# Patient Record
Sex: FEMALE | Race: WHITE | Employment: UNEMPLOYED | URBAN - NONMETROPOLITAN AREA
[De-identification: names, ages, dates, MRNs, and addresses within clinical notes are randomized per-mention and may not be internally consistent; named-entity substitution may affect disease eponyms.]

---

## 2018-08-13 ENCOUNTER — HOSPITAL ENCOUNTER (EMERGENCY)
Age: 58
Discharge: HOME | End: 2018-08-13
Attending: EMERGENCY MEDICINE
Payer: MEDICAID

## 2018-08-13 VITALS — SYSTOLIC BLOOD PRESSURE: 136 MMHG | TEMPERATURE: 97.8 F | OXYGEN SATURATION: 96 % | DIASTOLIC BLOOD PRESSURE: 65 MMHG

## 2018-08-13 DIAGNOSIS — R20.2 PARESTHESIAS: Primary | ICD-10-CM

## 2018-08-13 LAB
ABSOLUTE EOS #: 0.15 K/UL (ref 0–0.44)
ABSOLUTE IMMATURE GRANULOCYTE: 0.03 K/UL (ref 0–0.3)
ABSOLUTE LYMPH #: 1.76 K/UL (ref 1.1–3.7)
ABSOLUTE MONO #: 0.59 K/UL (ref 0.1–1.2)
ALBUMIN SERPL-MCNC: 4.2 G/DL (ref 3.5–5.2)
ALBUMIN/GLOBULIN RATIO: 1.6 (ref 1–2.5)
ALP BLD-CCNC: 106 U/L (ref 35–104)
ALT SERPL-CCNC: 43 U/L (ref 5–33)
ANION GAP SERPL CALCULATED.3IONS-SCNC: 11 MMOL/L (ref 9–17)
AST SERPL-CCNC: 26 U/L
BASOPHILS # BLD: 1 % (ref 0–2)
BASOPHILS ABSOLUTE: 0.05 K/UL (ref 0–0.2)
BILIRUB SERPL-MCNC: 0.16 MG/DL (ref 0.3–1.2)
BUN BLDV-MCNC: 12 MG/DL (ref 6–20)
BUN/CREAT BLD: 18 (ref 9–20)
CALCIUM SERPL-MCNC: 9.4 MG/DL (ref 8.6–10.4)
CHLORIDE BLD-SCNC: 107 MMOL/L (ref 98–107)
CO2: 26 MMOL/L (ref 20–31)
CREAT SERPL-MCNC: 0.67 MG/DL (ref 0.5–0.9)
DIFFERENTIAL TYPE: NORMAL
EKG ATRIAL RATE: 65 BPM
EKG P AXIS: 41 DEGREES
EKG P-R INTERVAL: 158 MS
EKG Q-T INTERVAL: 430 MS
EKG QRS DURATION: 76 MS
EKG QTC CALCULATION (BAZETT): 447 MS
EKG R AXIS: -14 DEGREES
EKG T AXIS: 7 DEGREES
EKG VENTRICULAR RATE: 65 BPM
EOSINOPHILS RELATIVE PERCENT: 2 % (ref 1–4)
GFR AFRICAN AMERICAN: >60 ML/MIN
GFR NON-AFRICAN AMERICAN: >60 ML/MIN
GFR SERPL CREATININE-BSD FRML MDRD: ABNORMAL ML/MIN/{1.73_M2}
GFR SERPL CREATININE-BSD FRML MDRD: ABNORMAL ML/MIN/{1.73_M2}
GLUCOSE BLD-MCNC: 90 MG/DL (ref 70–99)
HCT VFR BLD CALC: 42.5 % (ref 36.3–47.1)
HEMOGLOBIN: 14.2 G/DL (ref 11.9–15.1)
IMMATURE GRANULOCYTES: 0 %
INR BLD: 1 (ref 0.9–1.2)
LYMPHOCYTES # BLD: 24 % (ref 24–43)
MAGNESIUM: 2.2 MG/DL (ref 1.6–2.6)
MCH RBC QN AUTO: 29.4 PG (ref 25.2–33.5)
MCHC RBC AUTO-ENTMCNC: 33.4 G/DL (ref 28.4–34.8)
MCV RBC AUTO: 88 FL (ref 82.6–102.9)
MONOCYTES # BLD: 8 % (ref 3–12)
MYOGLOBIN: 32 NG/ML (ref 25–58)
NRBC AUTOMATED: 0 PER 100 WBC
PARTIAL THROMBOPLASTIN TIME: 28.5 SEC (ref 23.2–34.4)
PDW BLD-RTO: 13 % (ref 11.8–14.4)
PLATELET # BLD: 342 K/UL (ref 138–453)
PLATELET ESTIMATE: NORMAL
PMV BLD AUTO: 9.4 FL (ref 8.1–13.5)
POTASSIUM SERPL-SCNC: 3.2 MMOL/L (ref 3.7–5.3)
PROTHROMBIN TIME: 10.3 SEC (ref 9.7–12.2)
RBC # BLD: 4.83 M/UL (ref 3.95–5.11)
RBC # BLD: NORMAL 10*6/UL
SEG NEUTROPHILS: 65 % (ref 36–65)
SEGMENTED NEUTROPHILS ABSOLUTE COUNT: 4.65 K/UL (ref 1.5–8.1)
SODIUM BLD-SCNC: 144 MMOL/L (ref 135–144)
TOTAL PROTEIN: 6.8 G/DL (ref 6.4–8.3)
WBC # BLD: 7.2 K/UL (ref 3.5–11.3)
WBC # BLD: NORMAL 10*3/UL

## 2018-08-13 PROCEDURE — 36415 COLL VENOUS BLD VENIPUNCTURE: CPT

## 2018-08-13 PROCEDURE — 80053 COMPREHEN METABOLIC PANEL: CPT

## 2018-08-13 PROCEDURE — 83874 ASSAY OF MYOGLOBIN: CPT

## 2018-08-13 PROCEDURE — 83735 ASSAY OF MAGNESIUM: CPT

## 2018-08-13 PROCEDURE — 99284 EMERGENCY DEPT VISIT MOD MDM: CPT

## 2018-08-13 PROCEDURE — 85025 COMPLETE CBC W/AUTO DIFF WBC: CPT

## 2018-08-13 PROCEDURE — 85610 PROTHROMBIN TIME: CPT

## 2018-08-13 PROCEDURE — 85730 THROMBOPLASTIN TIME PARTIAL: CPT

## 2018-08-13 PROCEDURE — 93005 ELECTROCARDIOGRAM TRACING: CPT

## 2018-08-13 RX ORDER — DILTIAZEM HYDROCHLORIDE EXTENDED-RELEASE TABLETS 240 MG/1
TABLET, EXTENDED RELEASE ORAL 2 TIMES DAILY
COMMUNITY
End: 2018-09-05

## 2018-08-13 RX ORDER — GABAPENTIN 300 MG/1
300 CAPSULE ORAL 2 TIMES DAILY
COMMUNITY
End: 2018-09-05 | Stop reason: SDUPTHER

## 2018-08-13 RX ORDER — GABAPENTIN 300 MG/1
300 CAPSULE ORAL 2 TIMES DAILY
Qty: 30 CAPSULE | Refills: 0 | Status: SHIPPED | OUTPATIENT
Start: 2018-08-13 | End: 2018-09-05 | Stop reason: SDUPTHER

## 2018-08-13 RX ORDER — BUSPIRONE HYDROCHLORIDE 10 MG/1
10 TABLET ORAL 2 TIMES DAILY
COMMUNITY
End: 2018-09-05 | Stop reason: SDUPTHER

## 2018-08-13 RX ORDER — ATENOLOL 25 MG/1
25 TABLET ORAL DAILY
COMMUNITY
End: 2018-09-05 | Stop reason: SDUPTHER

## 2018-08-13 RX ORDER — BUPROPION HYDROCHLORIDE 150 MG/1
150 TABLET ORAL EVERY MORNING
COMMUNITY
End: 2018-09-05 | Stop reason: SDUPTHER

## 2018-08-13 RX ORDER — OMEPRAZOLE 20 MG/1
40 CAPSULE, DELAYED RELEASE ORAL DAILY
COMMUNITY
End: 2018-09-05 | Stop reason: SDUPTHER

## 2018-08-13 ASSESSMENT — PAIN DESCRIPTION - PAIN TYPE: TYPE: ACUTE PAIN

## 2018-08-13 ASSESSMENT — PAIN DESCRIPTION - DESCRIPTORS: DESCRIPTORS: ACHING;DISCOMFORT

## 2018-08-13 ASSESSMENT — PAIN DESCRIPTION - FREQUENCY: FREQUENCY: CONTINUOUS

## 2018-08-13 ASSESSMENT — PAIN DESCRIPTION - LOCATION: LOCATION: HEAD

## 2018-08-13 ASSESSMENT — PAIN SCALES - GENERAL: PAINLEVEL_OUTOF10: 4

## 2018-08-13 NOTE — ED PROVIDER NOTES
Performed by ED Physician - none    LABS:  Labs Reviewed   COMPREHENSIVE METABOLIC PANEL - Abnormal; Notable for the following:        Result Value    Potassium 3.2 (*)     Alkaline Phosphatase 106 (*)     ALT 43 (*)     Total Bilirubin 0.16 (*)     All other components within normal limits   APTT   CBC WITH AUTO DIFFERENTIAL   PROTIME-INR   MAGNESIUM   MYOGLOBIN, SERUM       All other labs were within normal range or not returned as of this dictation. EMERGENCY DEPARTMENT COURSE and DIFFERENTIAL DIAGNOSIS/MDM:   Vitals:    Vitals:    08/13/18 1739 08/13/18 1741   BP:  (!) 154/97   Temp:  97.8 °F (36.6 °C)   TempSrc:  Tympanic   SpO2: 97%        Patient has a pretty much intact neurologic exam at this time. I have reviewed partial records from her hospital visits to the [facility] and there is one dated August 1 which states that her recent medical care showed that she was seen and hospitalized with a small left temporal CVA and CN III changes. This does not explain the patient's generalized facial and all 4 extremity numbness and tingling. I do not have access at this time to the her MRI results and other imaging studies that were performed at that facility. The plan is to refer her to outpatient primary care and neurology follow-up. Patient states that she has started feeling worse after running out of gabapentin 2 days ago and is provided another prescription for gabapentin 300 mg twice a day. She is also advised to start taking one low-dose aspirin tablet daily. MDM    CONSULTS:  None    PROCEDURES:  Unless otherwise noted below, none     Procedures    FINAL IMPRESSION      1.  Paresthesias          DISPOSITION/PLAN   DISPOSITION Decision To Discharge 08/13/2018 06:35:08 PM      PATIENT REFERRED TO:  [address]  [phone]  Schedule an appointment as soon as possible for a visit       Spike Mora MD  [address] [facility]  [place] [address]  [phone]    Schedule an appointment as soon as possible for a visit         DISCHARGE MEDICATIONS:  New Prescriptions    GABAPENTIN (NEURONTIN) 300 MG CAPSULE    Take 1 capsule by mouth 2 times daily for 15 days. .          Problem List:  There is no problem list on file for this patient. Summation      Patient Course:  Stable. ED Medications administered this visit:  Medications - No data to display    New Prescriptions from this visit:    New Prescriptions    GABAPENTIN (NEURONTIN) 300 MG CAPSULE    Take 1 capsule by mouth 2 times daily for 15 days. .       Follow-up:  [facility]  [address] [address]  [phone]  Schedule an appointment as soon as possible for a visit       Rivera Gardner MD  [address] [facility]  [phone]    Schedule an appointment as soon as possible for a visit           Final Impression:   1.  Paresthesias               (Please note that portions of this note were completed with a voice recognition program.  Efforts were made to edit the dictations but occasionally words are mis-transcribed.)    Alexis Miller MD (electronically signed)  Attending Emergency Physician            Alexis Miller MD  08/13/18 2287

## 2018-08-20 RX ORDER — ASPIRIN 81 MG/1
81 TABLET, CHEWABLE ORAL DAILY
COMMUNITY

## 2018-09-05 ENCOUNTER — TELEPHONE (OUTPATIENT)
Dept: PRIMARY CARE CLINIC | Age: 58
End: 2018-09-05

## 2018-09-05 ENCOUNTER — OFFICE VISIT (OUTPATIENT)
Dept: PRIMARY CARE CLINIC | Age: 58
End: 2018-09-05
Payer: MEDICAID

## 2018-09-05 VITALS
SYSTOLIC BLOOD PRESSURE: 125 MMHG | RESPIRATION RATE: 20 BRPM | WEIGHT: 198.1 LBS | TEMPERATURE: 97.8 F | DIASTOLIC BLOOD PRESSURE: 91 MMHG | HEART RATE: 70 BPM

## 2018-09-05 DIAGNOSIS — Z86.39 HISTORY OF LOW POTASSIUM: ICD-10-CM

## 2018-09-05 DIAGNOSIS — Z13.220 LIPID SCREENING: ICD-10-CM

## 2018-09-05 DIAGNOSIS — F33.1 MODERATE EPISODE OF RECURRENT MAJOR DEPRESSIVE DISORDER (HCC): ICD-10-CM

## 2018-09-05 DIAGNOSIS — I10 ESSENTIAL HYPERTENSION: Primary | ICD-10-CM

## 2018-09-05 DIAGNOSIS — Z12.31 SCREENING MAMMOGRAM, ENCOUNTER FOR: ICD-10-CM

## 2018-09-05 DIAGNOSIS — E07.9 THYROID MASS: ICD-10-CM

## 2018-09-05 DIAGNOSIS — Z12.11 COLON CANCER SCREENING: ICD-10-CM

## 2018-09-05 DIAGNOSIS — Z12.11 COLON CANCER SCREENING: Primary | ICD-10-CM

## 2018-09-05 DIAGNOSIS — Z23 NEED FOR INFLUENZA VACCINATION: ICD-10-CM

## 2018-09-05 DIAGNOSIS — I63.9 CEREBROVASCULAR ACCIDENT (CVA), UNSPECIFIED MECHANISM (HCC): ICD-10-CM

## 2018-09-05 DIAGNOSIS — K21.9 GASTROESOPHAGEAL REFLUX DISEASE, ESOPHAGITIS PRESENCE NOT SPECIFIED: ICD-10-CM

## 2018-09-05 PROCEDURE — 90686 IIV4 VACC NO PRSV 0.5 ML IM: CPT | Performed by: NURSE PRACTITIONER

## 2018-09-05 PROCEDURE — 99203 OFFICE O/P NEW LOW 30 MIN: CPT | Performed by: NURSE PRACTITIONER

## 2018-09-05 PROCEDURE — 90471 IMMUNIZATION ADMIN: CPT | Performed by: NURSE PRACTITIONER

## 2018-09-05 PROCEDURE — 96160 PT-FOCUSED HLTH RISK ASSMT: CPT | Performed by: NURSE PRACTITIONER

## 2018-09-05 RX ORDER — BUSPIRONE HYDROCHLORIDE 10 MG/1
10 TABLET ORAL 2 TIMES DAILY
Qty: 60 TABLET | Refills: 3 | Status: SHIPPED | OUTPATIENT
Start: 2018-09-05 | End: 2019-06-04 | Stop reason: ALTCHOICE

## 2018-09-05 RX ORDER — PHENTERMINE HYDROCHLORIDE 37.5 MG/1
37.5 CAPSULE ORAL EVERY MORNING
COMMUNITY
End: 2018-09-24 | Stop reason: ALTCHOICE

## 2018-09-05 RX ORDER — BUPROPION HYDROCHLORIDE 150 MG/1
150 TABLET ORAL EVERY MORNING
Qty: 30 TABLET | Refills: 3 | Status: SHIPPED | OUTPATIENT
Start: 2018-09-05 | End: 2018-12-03 | Stop reason: SDUPTHER

## 2018-09-05 RX ORDER — GABAPENTIN 300 MG/1
300 CAPSULE ORAL 2 TIMES DAILY
Qty: 60 CAPSULE | Refills: 0 | Status: SHIPPED | OUTPATIENT
Start: 2018-09-05 | End: 2018-10-05 | Stop reason: SDUPTHER

## 2018-09-05 RX ORDER — DILTIAZEM HYDROCHLORIDE 180 MG/1
180 CAPSULE, COATED, EXTENDED RELEASE ORAL DAILY
Qty: 30 CAPSULE | Refills: 3 | Status: SHIPPED | OUTPATIENT
Start: 2018-09-05 | End: 2018-09-06 | Stop reason: SDUPTHER

## 2018-09-05 RX ORDER — CHOLECALCIFEROL (VITAMIN D3) 125 MCG
5 CAPSULE ORAL DAILY
COMMUNITY
End: 2019-12-09

## 2018-09-05 RX ORDER — DILTIAZEM HYDROCHLORIDE 180 MG/1
180 CAPSULE, EXTENDED RELEASE ORAL 2 TIMES DAILY
Qty: 30 CAPSULE | Refills: 3 | Status: SHIPPED | OUTPATIENT
Start: 2018-09-05 | End: 2018-12-03 | Stop reason: DRUGHIGH

## 2018-09-05 RX ORDER — OMEPRAZOLE 20 MG/1
20 CAPSULE, DELAYED RELEASE ORAL DAILY
Qty: 30 CAPSULE | Refills: 3 | Status: SHIPPED | OUTPATIENT
Start: 2018-09-05 | End: 2019-05-30 | Stop reason: SDUPTHER

## 2018-09-05 RX ORDER — GABAPENTIN 300 MG/1
300 CAPSULE ORAL 2 TIMES DAILY
Qty: 90 CAPSULE | Status: CANCELLED | OUTPATIENT
Start: 2018-09-05

## 2018-09-05 RX ORDER — DILTIAZEM HYDROCHLORIDE EXTENDED-RELEASE TABLETS 240 MG/1
TABLET, EXTENDED RELEASE ORAL 2 TIMES DAILY
Qty: 30 TABLET | Status: CANCELLED | OUTPATIENT
Start: 2018-09-05

## 2018-09-05 RX ORDER — ATENOLOL 25 MG/1
25 TABLET ORAL DAILY
Qty: 30 TABLET | Refills: 3 | Status: SHIPPED | OUTPATIENT
Start: 2018-09-05 | End: 2019-04-07 | Stop reason: SDUPTHER

## 2018-09-05 RX ORDER — DILTIAZEM HYDROCHLORIDE 180 MG/1
180 CAPSULE, EXTENDED RELEASE ORAL 2 TIMES DAILY
COMMUNITY
End: 2018-09-05 | Stop reason: SDUPTHER

## 2018-09-05 ASSESSMENT — PATIENT HEALTH QUESTIONNAIRE - PHQ9
7. TROUBLE CONCENTRATING ON THINGS, SUCH AS READING THE NEWSPAPER OR WATCHING TELEVISION: 0
SUM OF ALL RESPONSES TO PHQ QUESTIONS 1-9: 9
3. TROUBLE FALLING OR STAYING ASLEEP: 3
1. LITTLE INTEREST OR PLEASURE IN DOING THINGS: 2
SUM OF ALL RESPONSES TO PHQ QUESTIONS 1-9: 9
10. IF YOU CHECKED OFF ANY PROBLEMS, HOW DIFFICULT HAVE THESE PROBLEMS MADE IT FOR YOU TO DO YOUR WORK, TAKE CARE OF THINGS AT HOME, OR GET ALONG WITH OTHER PEOPLE: 0
SUM OF ALL RESPONSES TO PHQ9 QUESTIONS 1 & 2: 4
4. FEELING TIRED OR HAVING LITTLE ENERGY: 1
5. POOR APPETITE OR OVEREATING: 0
2. FEELING DOWN, DEPRESSED OR HOPELESS: 2
9. THOUGHTS THAT YOU WOULD BE BETTER OFF DEAD, OR OF HURTING YOURSELF: 0
8. MOVING OR SPEAKING SO SLOWLY THAT OTHER PEOPLE COULD HAVE NOTICED. OR THE OPPOSITE, BEING SO FIGETY OR RESTLESS THAT YOU HAVE BEEN MOVING AROUND A LOT MORE THAN USUAL: 0
6. FEELING BAD ABOUT YOURSELF - OR THAT YOU ARE A FAILURE OR HAVE LET YOURSELF OR YOUR FAMILY DOWN: 1

## 2018-09-05 ASSESSMENT — ENCOUNTER SYMPTOMS
COUGH: 0
RESPIRATORY NEGATIVE: 1
GASTROINTESTINAL NEGATIVE: 1
SORE THROAT: 0
WHEEZING: 0
SHORTNESS OF BREATH: 0
CHEST TIGHTNESS: 0
TROUBLE SWALLOWING: 0

## 2018-09-05 NOTE — TELEPHONE ENCOUNTER
Direct Endoscopy Referral       Fax to [phone] or call the [facility] [address] [facility] [address] [facility]  1960  [phone]     [facility]    Referring Provider: Idalmis Atwood, OLIVER - CNP   [address] [phone]  Special Requests: PLEASE CALL AND SCHEDULE PATIENT    Check Requested Procedures:    [x]  Colonoscopy (Please check test type and diagnosis below)    [x]  CPT Code:  Screening Average Risk     []  CPT Code:  Screening High Risk                 []  CPT Code :  Diagnostic Colonoscopy 66858    []  Personal History of colon cancer (Date of surgery)               []  Personal History of colon polyps (Date of surgery)     []  Family history of colon cancer (Who)     []  Family history of colon polyps (1st degree relative - Who)     []  Abnormal barium enema or CT (Please attach report)    []  Change in bowel habits     []  Occult GI bleeding     []  Melena with negative EGD     []  Iron deficiency anemia    []  Other:        []  EGD (Please check test type and diagnosis below)     []  CPT Code: EGD 18845    []  Melena     []  Dyspepsia/GERD     []  Dysphagia     []  Epigastric pain unresponsive to treatment     []  Iron deficiency anemia with negative colonoscopy     []  Occult GI bleeding with negative colonoscopy     []  Abnormal UGI, x-ray, or CT (attach report)    [] Other:    HISTORY:  Past Medical History:   Diagnosis Date    Anxiety 2012    Broken bones 1985    Depression 2012    Gallbladder & bile duct stone with obstruction 2000    Hyperlipidemia 2012    Hypertension 2012    Numbness and tingling     generalized    Stroke (cerebrum) ([facility] [address] 2018     Patient has no known allergies. Prior to Visit Medications    Medication Sig Taking? Authorizing Provider   melatonin 5 MG TABS tablet Take 5 mg by mouth daily  Historical Provider, MD   phentermine (ADIPEX-P) 37.5 MG capsule Take 37.5 mg by mouth every morning.

## 2018-09-05 NOTE — PROGRESS NOTES
After obtaining consent, and per orders of Jason Vazquez CNP, injection of Influenza Vaccine given in Right deltoid by Daniela Schuster. Patient instructed to remain in clinic for 20 minutes afterwards, and to report any adverse reaction to me immediately.

## 2018-09-05 NOTE — PROGRESS NOTES
weakness to her extremities and a left-sided facial droop however that has improved now. Patient denies upper or lower body extremity weakness or paresthesias at this time. States she did have left facial drooping however verbalizes that has resolved. Patient had recently been seen out of state and had CT scan done and MRI. Patient was diagnosed with a small left temporal CVA with cranial nerve III changes. Other   This is a chronic (\"STOMACH ULCER\") problem. The current episode started more than 1 year ago. Episode frequency: controlled with Omeprazole. The problem has been resolved. Associated symptoms include headaches. Pertinent negatives include no chest pain, coughing, fever, sore throat or weakness. Nothing aggravates the symptoms. Treatments tried: Omeprazole. The treatment provided significant relief. Mental Health Problem   Primary symptoms comment: DEPRESSION/ANXIETY: states had stroke in July and doesn't like the idea of having to ask people to help her, is used to taking care of her mother. . This is a chronic problem. The degree of incapacity that she is experiencing as a consequence of her illness is mild. Additional symptoms of the illness include headaches. Additional symptoms of the illness do not include hypersomnia, appetite change or seizures. She does not admit to suicidal ideas. She does not have a plan to commit suicide. She does not contemplate harming herself. She has not already injured self. She does not contemplate injuring another person. She has not already  injured another person. Risk factors that are present for mental illness include a family history of mental illness.          Past Medical History:     Past Medical History:   Diagnosis Date    Anxiety 2012    Broken bones 1985    Depression 2012    Gallbladder & bile duct stone with obstruction 2000    Hyperlipidemia 2012    Hypertension 2012    Numbness and tingling     generalized    Stroke (cerebrum) ([facility] [address] monitor blood pressure at home and call office with readings. Patient verbalizes her blood pressures are controlled at home. States she is nervous here in the office today. We'll continue to monitor. 2.  CVA: Referral placed to neurology for evaluation and treatment. Formed patient if she has any recurrent symptoms easily emergency room for evaluation and treatment. Patient verbalizes understanding. 3.  Depression: Patient verbalizes she is doing well on BuSpar and Wellbutrin. We'll continue to monitor. 4.  GERD: Patient verbalizes she is doing well with Prilosec. Avoiding GERD related foods    5. History of low potassium: We'll obtain labs for evaluation of potassium level today. 6.  Thyroid mass: Ultrasound of the thyroid ordered today. 7.  Lipid screening: Lipid panel ordered today. 8.  Breast cancer screening: Mammogram ordered today. 9.  Colon cancer screening: Order placed to Dr. Selena Marquez gastroenterology for colonoscopy. 10.  Need for influenza vaccination: Influenza vaccination given in office today. 1.  Renata received counseling on the following healthy behaviors: nutrition, exercise and medication adherence  2. Patient given educational materials - see patient instructions  3. Was a self-tracking handout given in paper form or via N4MDt? No  If yes, see orders or list here. 4.  Discussed use, benefit, and side effects of prescribed medications. Barriers to medication compliance addressed. All patient questions answered. Pt voiced understanding. 5.  Reviewed prior labs and health maintenance  6. Continue current medications, diet and exercise.     Completed Refills   Requested Prescriptions     Signed Prescriptions Disp Refills    busPIRone (BUSPAR) 10 MG tablet 60 tablet 3     Sig: Take 1 tablet by mouth 2 times daily    omeprazole (PRILOSEC) 20 MG delayed release capsule 30 capsule 3     Sig: Take 1 capsule by mouth daily    buPROPion (WELLBUTRIN XL) 150 MG extended release tablet 30 tablet 3     Sig: Take 1 tablet by mouth every morning    atenolol (TENORMIN) 25 MG tablet 30 tablet 3     Sig: Take 1 tablet by mouth daily    gabapentin (NEURONTIN) 300 MG capsule 60 capsule 0     Sig: Take 1 capsule by mouth 2 times daily for 30 days. Kenia Chaudhry diltiazem (DILACOR XR) 180 MG extended release capsule 30 capsule 3     Sig: Take 1 capsule by mouth 2 times daily         Return in about 3 months (around 12/5/2018) for HTN, B/P check one week and at two weeks.     Electronically signed by Electronically signed by Sharmaine Quinn CNP on 9/5/18 at 1:06 PM    (Please note that portions of this note were completed with a voice recognition program.  Efforts were made to edit the dictations but occasionally words are mis-transcribed.)

## 2018-09-06 ENCOUNTER — TELEPHONE (OUTPATIENT)
Dept: PRIMARY CARE CLINIC | Age: 58
End: 2018-09-06

## 2018-09-06 ENCOUNTER — HOSPITAL ENCOUNTER (OUTPATIENT)
Age: 58
Discharge: HOME | End: 2018-09-06
Payer: MEDICAID

## 2018-09-06 DIAGNOSIS — R73.09 ELEVATED GLUCOSE: Primary | ICD-10-CM

## 2018-09-06 DIAGNOSIS — Z13.220 LIPID SCREENING: ICD-10-CM

## 2018-09-06 DIAGNOSIS — R79.89 ELEVATED SERUM CREATININE: Primary | ICD-10-CM

## 2018-09-06 DIAGNOSIS — I63.9 CEREBROVASCULAR ACCIDENT (CVA), UNSPECIFIED MECHANISM (HCC): Primary | ICD-10-CM

## 2018-09-06 DIAGNOSIS — I10 ESSENTIAL HYPERTENSION: ICD-10-CM

## 2018-09-06 DIAGNOSIS — R73.09 ELEVATED GLUCOSE: ICD-10-CM

## 2018-09-06 LAB
ALBUMIN SERPL-MCNC: 3.9 G/DL (ref 3.5–5.2)
ALBUMIN/GLOBULIN RATIO: 1.4 (ref 1–2.5)
ALP BLD-CCNC: 102 U/L (ref 35–104)
ALT SERPL-CCNC: 22 U/L (ref 5–33)
ANION GAP SERPL CALCULATED.3IONS-SCNC: 8 MMOL/L (ref 9–17)
AST SERPL-CCNC: 15 U/L
BILIRUB SERPL-MCNC: 0.23 MG/DL (ref 0.3–1.2)
BUN BLDV-MCNC: 12 MG/DL (ref 6–20)
BUN/CREAT BLD: 12 (ref 9–20)
CALCIUM SERPL-MCNC: 9.4 MG/DL (ref 8.6–10.4)
CHLORIDE BLD-SCNC: 106 MMOL/L (ref 98–107)
CHOLESTEROL/HDL RATIO: 4
CHOLESTEROL: 186 MG/DL
CO2: 27 MMOL/L (ref 20–31)
CREAT SERPL-MCNC: 1 MG/DL (ref 0.5–0.9)
ESTIMATED AVERAGE GLUCOSE: 100 MG/DL
GFR AFRICAN AMERICAN: >60 ML/MIN
GFR NON-AFRICAN AMERICAN: 57 ML/MIN
GFR SERPL CREATININE-BSD FRML MDRD: ABNORMAL ML/MIN/{1.73_M2}
GFR SERPL CREATININE-BSD FRML MDRD: ABNORMAL ML/MIN/{1.73_M2}
GLUCOSE BLD-MCNC: 114 MG/DL (ref 70–99)
HBA1C MFR BLD: 5.1 % (ref 4.8–5.9)
HDLC SERPL-MCNC: 47 MG/DL
LDL CHOLESTEROL: 90 MG/DL (ref 0–130)
POTASSIUM SERPL-SCNC: 4.3 MMOL/L (ref 3.7–5.3)
SODIUM BLD-SCNC: 141 MMOL/L (ref 135–144)
TOTAL PROTEIN: 6.6 G/DL (ref 6.4–8.3)
TRIGL SERPL-MCNC: 246 MG/DL
TSH SERPL DL<=0.05 MIU/L-ACNC: 3.33 MIU/L (ref 0.3–5)
VLDLC SERPL CALC-MCNC: ABNORMAL MG/DL (ref 1–30)

## 2018-09-06 PROCEDURE — 80061 LIPID PANEL: CPT

## 2018-09-06 PROCEDURE — 36415 COLL VENOUS BLD VENIPUNCTURE: CPT

## 2018-09-06 PROCEDURE — 80053 COMPREHEN METABOLIC PANEL: CPT

## 2018-09-06 PROCEDURE — 83036 HEMOGLOBIN GLYCOSYLATED A1C: CPT

## 2018-09-06 PROCEDURE — 84443 ASSAY THYROID STIM HORMONE: CPT

## 2018-09-06 RX ORDER — ATORVASTATIN CALCIUM 40 MG/1
40 TABLET, FILM COATED ORAL DAILY
Qty: 30 TABLET | Refills: 3 | Status: SHIPPED | OUTPATIENT
Start: 2018-09-06 | End: 2018-12-13 | Stop reason: SDUPTHER

## 2018-09-07 ENCOUNTER — TELEPHONE (OUTPATIENT)
Dept: PRIMARY CARE CLINIC | Age: 58
End: 2018-09-07

## 2018-09-07 NOTE — TELEPHONE ENCOUNTER
Left message that medication was called into the pharmacy for pt. Advised in message for pt to call with any questions.

## 2018-09-07 NOTE — TELEPHONE ENCOUNTER
----- Message from [address] [place] [address], OLIVER - CNP sent at 9/7/2018  9:26 AM EDT -----  Results are normal, please call patient and make them aware.

## 2018-09-13 ENCOUNTER — NURSE ONLY (OUTPATIENT)
Dept: PRIMARY CARE CLINIC | Age: 58
End: 2018-09-13

## 2018-09-13 VITALS
TEMPERATURE: 97.2 F | WEIGHT: 199.4 LBS | HEART RATE: 79 BPM | RESPIRATION RATE: 18 BRPM | DIASTOLIC BLOOD PRESSURE: 85 MMHG | SYSTOLIC BLOOD PRESSURE: 123 MMHG

## 2018-09-13 DIAGNOSIS — Z01.30 BLOOD PRESSURE CHECK: Primary | ICD-10-CM

## 2018-09-19 ENCOUNTER — NURSE ONLY (OUTPATIENT)
Dept: PRIMARY CARE CLINIC | Age: 58
End: 2018-09-19

## 2018-09-19 VITALS
TEMPERATURE: 97.7 F | DIASTOLIC BLOOD PRESSURE: 82 MMHG | WEIGHT: 201 LBS | SYSTOLIC BLOOD PRESSURE: 114 MMHG | HEART RATE: 63 BPM | RESPIRATION RATE: 20 BRPM

## 2018-09-19 DIAGNOSIS — Z01.30 BLOOD PRESSURE CHECK: Primary | ICD-10-CM

## 2018-09-24 ENCOUNTER — HOSPITAL ENCOUNTER (OUTPATIENT)
Age: 58
Discharge: HOME | End: 2018-09-24
Payer: MEDICAID

## 2018-09-24 DIAGNOSIS — R29.90 NEUROLOGICAL SYMPTOMS: ICD-10-CM

## 2018-09-24 PROBLEM — I63.9 CEREBROVASCULAR ACCIDENT (CVA) (HCC): Status: RESOLVED | Noted: 2018-09-05 | Resolved: 2018-09-24

## 2018-09-24 LAB
BUN BLDV-MCNC: 11 MG/DL (ref 6–20)
CALCIUM IONIZED: 1.3 MMOL/L (ref 1.13–1.33)
CREAT SERPL-MCNC: 0.94 MG/DL (ref 0.5–0.9)
FOLATE: 6 NG/ML
GFR AFRICAN AMERICAN: >60 ML/MIN
GFR NON-AFRICAN AMERICAN: >60 ML/MIN
GFR SERPL CREATININE-BSD FRML MDRD: ABNORMAL ML/MIN/{1.73_M2}
GFR SERPL CREATININE-BSD FRML MDRD: ABNORMAL ML/MIN/{1.73_M2}
VITAMIN B-12: 478 PG/ML (ref 232–1245)

## 2018-09-24 PROCEDURE — 36415 COLL VENOUS BLD VENIPUNCTURE: CPT

## 2018-09-24 PROCEDURE — 82607 VITAMIN B-12: CPT

## 2018-09-24 PROCEDURE — 82746 ASSAY OF FOLIC ACID SERUM: CPT

## 2018-09-24 PROCEDURE — 83735 ASSAY OF MAGNESIUM: CPT

## 2018-09-24 PROCEDURE — 84520 ASSAY OF UREA NITROGEN: CPT

## 2018-09-24 PROCEDURE — 82330 ASSAY OF CALCIUM: CPT

## 2018-09-24 PROCEDURE — 82565 ASSAY OF CREATININE: CPT

## 2018-09-25 ENCOUNTER — ANESTHESIA EVENT (OUTPATIENT)
Dept: OPERATING ROOM | Age: 58
End: 2018-09-25
Payer: MEDICAID

## 2018-09-25 ENCOUNTER — HOSPITAL ENCOUNTER (OUTPATIENT)
Age: 58
Setting detail: OUTPATIENT SURGERY
Discharge: HOME | End: 2018-09-25
Attending: INTERNAL MEDICINE | Admitting: INTERNAL MEDICINE
Payer: MEDICAID

## 2018-09-25 ENCOUNTER — ANESTHESIA (OUTPATIENT)
Dept: OPERATING ROOM | Age: 58
End: 2018-09-25
Payer: MEDICAID

## 2018-09-25 VITALS
RESPIRATION RATE: 18 BRPM | HEART RATE: 63 BPM | BODY MASS INDEX: 35.44 KG/M2 | SYSTOLIC BLOOD PRESSURE: 116 MMHG | HEIGHT: 63 IN | OXYGEN SATURATION: 95 % | TEMPERATURE: 97 F | WEIGHT: 200 LBS | DIASTOLIC BLOOD PRESSURE: 66 MMHG

## 2018-09-25 VITALS — OXYGEN SATURATION: 97 % | SYSTOLIC BLOOD PRESSURE: 103 MMHG | DIASTOLIC BLOOD PRESSURE: 66 MMHG

## 2018-09-25 LAB — MAGNESIUM, IONIZED: 0.66 MMOL/L (ref 0.45–0.6)

## 2018-09-25 PROCEDURE — 3700000000 HC ANESTHESIA ATTENDED CARE: Performed by: INTERNAL MEDICINE

## 2018-09-25 PROCEDURE — 2580000003 HC RX 258: Performed by: INTERNAL MEDICINE

## 2018-09-25 PROCEDURE — 6360000002 HC RX W HCPCS: Performed by: NURSE ANESTHETIST, CERTIFIED REGISTERED

## 2018-09-25 PROCEDURE — 2709999900 HC NON-CHARGEABLE SUPPLY: Performed by: INTERNAL MEDICINE

## 2018-09-25 PROCEDURE — 3700000001 HC ADD 15 MINUTES (ANESTHESIA): Performed by: INTERNAL MEDICINE

## 2018-09-25 PROCEDURE — 45378 DIAGNOSTIC COLONOSCOPY: CPT | Performed by: INTERNAL MEDICINE

## 2018-09-25 PROCEDURE — 7100000011 HC PHASE II RECOVERY - ADDTL 15 MIN: Performed by: INTERNAL MEDICINE

## 2018-09-25 PROCEDURE — 7100000010 HC PHASE II RECOVERY - FIRST 15 MIN: Performed by: INTERNAL MEDICINE

## 2018-09-25 PROCEDURE — 2500000003 HC RX 250 WO HCPCS: Performed by: NURSE ANESTHETIST, CERTIFIED REGISTERED

## 2018-09-25 PROCEDURE — 3609027000 HC COLONOSCOPY: Performed by: INTERNAL MEDICINE

## 2018-09-25 RX ORDER — PROPOFOL 10 MG/ML
INJECTION, EMULSION INTRAVENOUS CONTINUOUS PRN
Status: DISCONTINUED | OUTPATIENT
Start: 2018-09-25 | End: 2018-09-25 | Stop reason: SDUPTHER

## 2018-09-25 RX ORDER — PROPOFOL 10 MG/ML
INJECTION, EMULSION INTRAVENOUS PRN
Status: DISCONTINUED | OUTPATIENT
Start: 2018-09-25 | End: 2018-09-25 | Stop reason: SDUPTHER

## 2018-09-25 RX ORDER — LIDOCAINE HYDROCHLORIDE 20 MG/ML
INJECTION, SOLUTION INFILTRATION; PERINEURAL PRN
Status: DISCONTINUED | OUTPATIENT
Start: 2018-09-25 | End: 2018-09-25 | Stop reason: SDUPTHER

## 2018-09-25 RX ORDER — SODIUM CHLORIDE, SODIUM LACTATE, POTASSIUM CHLORIDE, CALCIUM CHLORIDE 600; 310; 30; 20 MG/100ML; MG/100ML; MG/100ML; MG/100ML
INJECTION, SOLUTION INTRAVENOUS CONTINUOUS
Status: DISCONTINUED | OUTPATIENT
Start: 2018-09-25 | End: 2018-09-25 | Stop reason: HOSPADM

## 2018-09-25 RX ADMIN — PROPOFOL 200 MCG/KG/MIN: 10 INJECTION, EMULSION INTRAVENOUS at 08:59

## 2018-09-25 RX ADMIN — PROPOFOL 50 MG: 10 INJECTION, EMULSION INTRAVENOUS at 08:59

## 2018-09-25 RX ADMIN — LIDOCAINE HYDROCHLORIDE 40 MG: 20 INJECTION, SOLUTION INFILTRATION; PERINEURAL at 08:59

## 2018-09-25 RX ADMIN — SODIUM CHLORIDE, POTASSIUM CHLORIDE, SODIUM LACTATE AND CALCIUM CHLORIDE: 600; 310; 30; 20 INJECTION, SOLUTION INTRAVENOUS at 08:32

## 2018-09-25 ASSESSMENT — PAIN SCALES - GENERAL: PAINLEVEL_OUTOF10: 0

## 2018-09-25 ASSESSMENT — LIFESTYLE VARIABLES: SMOKING_STATUS: 1

## 2018-09-25 ASSESSMENT — PAIN - FUNCTIONAL ASSESSMENT: PAIN_FUNCTIONAL_ASSESSMENT: 0-10

## 2018-09-25 NOTE — ANESTHESIA PRE PROCEDURE
Department of Anesthesiology  Preprocedure Note       Name:  Kvng Bear   Age:  60 y.o.  :  1960                                          MRN:  [id number]         Date:  2018      Surgeon: Jez Barrientos):  Adi Bender MD    Procedure: Procedure(s):  COLONOSCOPY    Medications prior to admission:   Prior to Admission medications    Medication Sig Start Date End Date Taking? Authorizing Provider   atorvastatin (LIPITOR) 40 MG tablet Take 1 tablet by mouth daily 18  Yes OLIVER Resendiz CNP   melatonin 5 MG TABS tablet Take 5 mg by mouth daily   Yes Historical Provider, MD   busPIRone (BUSPAR) 10 MG tablet Take 1 tablet by mouth 2 times daily 18  Yes OLIVER Resendiz CNP   omeprazole (PRILOSEC) 20 MG delayed release capsule Take 1 capsule by mouth daily 18  Yes OLIVER Resendiz CNP   buPROPion (WELLBUTRIN XL) 150 MG extended release tablet Take 1 tablet by mouth every morning 18  Yes OLIVER Resendiz CNP   atenolol (TENORMIN) 25 MG tablet Take 1 tablet by mouth daily 18  Yes OLIVER Resendiz CNP   gabapentin (NEURONTIN) 300 MG capsule Take 1 capsule by mouth 2 times daily for 30 days. . 9/5/18 10/5/18 Yes OLIVER Resendiz CNP   diltiazem (DILACOR XR) 180 MG extended release capsule Take 1 capsule by mouth 2 times daily 18  Yes OLIVER Resendiz CNP   aspirin 81 MG chewable tablet Take 81 mg by mouth daily   Yes Historical Provider, MD       Current medications:    Current Facility-Administered Medications   Medication Dose Route Frequency Provider Last Rate Last Dose    lactated ringers infusion   Intravenous Continuous Adi Bender  mL/hr at 18 5573         Allergies:  No Known Allergies    Problem List:    Patient Active Problem List   Diagnosis Code    Essential hypertension I10    Moderate episode of recurrent major depressive disorder ([facility] [address] F33.1    Gastroesophageal reflux disease K21.9    Colon cancer screening Z12.11    Neurological symptoms R29.90       Past Medical History:        Diagnosis Date    Anxiety 2012    Broken bones 1985    Depression 2012    Gallbladder & bile duct stone with obstruction 2000    Hyperlipidemia 2012    Hypertension 2012    Numbness and tingling     generalized    Stroke (cerebrum) ([facility] [address] 2018       Past Surgical History:        Procedure Laterality Date    ABDOMEN SURGERY      CHOLECYSTECTOMY      HERNIA REPAIR  2015    HYSTERECTOMY      TONSILLECTOMY AND ADENOIDECTOMY         Social History:    Social History   Substance Use Topics    Smoking status: Current Some Day Smoker     Packs/day: 0.25     Years: 40.00     Last attempt to quit: 2018    Smokeless tobacco: Never Used      Comment: 4 cigarettes daily     Alcohol use No      Comment: Former quit 7/2018                                Ready to quit: Not Answered  Counseling given: Not Answered      Vital Signs (Current):   Vitals:    09/25/18 0821   BP: 112/84   Pulse: 68   Resp: 18   Temp: 36.1 °C (96.9 °F)   TempSrc: Temporal   SpO2: 93%   Weight: 200 lb (90.7 kg)   Height: 5' 3\" (1.6 m)                                              BP Readings from Last 3 Encounters:   09/25/18 112/84   09/24/18 120/76   09/19/18 114/82       NPO Status: Time of last liquid consumption: 0715 (small sip of water with meds)                        Time of last solid consumption: 2200                        Date of last liquid consumption: 09/25/18                        Date of last solid food consumption: 09/23/18    BMI:   Wt Readings from Last 3 Encounters:   09/25/18 200 lb (90.7 kg)   09/24/18 201 lb (91.2 kg)   09/19/18 201 lb (91.2 kg)     Body mass index is 35.43 kg/m².     CBC:   Lab Results   Component Value Date    WBC 7.2 08/13/2018    RBC 4.83 08/13/2018    HGB 14.2 08/13/2018    HCT 42.5 08/13/2018    MCV 88.0 08/13/2018    RDW 13.0 08/13/2018     08/13/2018       CMP:   Lab Results   Component Value Date    NA

## 2018-09-25 NOTE — OP NOTE
PROCEDURE NOTE    DATE OF PROCEDURE: 9/25/2018    ENDOSCOPIST: Shelley Rivera. Thea Grace MD, Laureen Mcneil    ASSISTANT: None    PREOPERATIVE DIAGNOSIS: screening for colon cancer    POSTOPERATIVE DIAGNOSIS: normal colonic mucosa throughout    OPERATION: Colonoscopy --screening    ANESTHESIA: MAC     ESTIMATED BLOOD LOSS: No    COMPLICATIONS: None. SPECIMENS: were not obtained    HISTORY: The patient is a 60y.o. year old female with history of above preop diagnosis. Colonoscopy with possible biopsy or polypectomy has been recommended and I explained the risk, benefits, expected outcome, and alternatives to the procedure. Risks include but are not limited to bleeding, infection, respiratory distress, hypotension, and perforation of the colon. The patient understands and is in agreement. PROCEDURE: The patient was given monitored anesthesia care. The patient was given oxygen by nasal cannula. The colonoscope was inserted per rectum and advanced under direct vision to the cecum without difficulty. Findings:  Cecum/Ascending colon: normal    Transverse colon: normal    Descending/Sigmoid colon: normal    Rectum/Anus: examined in normal and retroflexed positions and was normal    The colon was decompressed and the scope was removed. The patient tolerated the procedure well. Current guidelines call for a repeat colonoscopy in 10 years.       Electronically signed by Nia Marques MD  on 9/25/2018 at 9:34 AM

## 2018-09-25 NOTE — PROGRESS NOTES
Dr. Misa Rowland spoke with pt and visitor. Discharge instructions given to pt and visitor. Discharge Criteria    Inpatients must meet Criteria 1 through 7. All other patients are either YES or N/A. If a NO is chosen then Anesthesia or Surgeon must be notified. 1.  Minimum 30 minutes after last dose of sedative medication, minimum 120 minutes after last dose of reversal agent. Yes      2. Systolic BP stable within 20 mmHg for 30 minutes & systolic BP between 90 & 119 or within 10 mmHg of baseline. Yes      3. Pulse between 60 and 100 or within 10 bpm of baseline. Yes      4. Spontaneous respiratory rate >/= 10 per minute. Yes      5. SaO2 >/= 95 or  >/= baseline. Yes      6. Able to cough and swallow or return to baseline function. Yes      7. Alert and oriented or return to baseline mental status. Yes      8. Demonstrates controlled, coordinated movements, ambulates with steady gait, or return to baseline activity function. Yes      9. Minimal or no pain or nausea, or at a level tolerable and acceptable to patient. Yes      10. Takes and retains oral fluids as allowed. Yes      11. Procedural / perioperative site stable. Minimal or no bleeding. Yes          12. If GI endoscopy procedure, minimal or no abdominal distention or passing flatus. Yes      13. Written discharge instructions and emergency telephone number provided. Yes      14. Accompanied by a responsible adult. Yes      Adult patient discharged from facility without responsible person meets above criteria plus the following:   a) remains awake without stimulus for 30 minutes     b) oriented appropriate for age      c) all vital signs stable   d) no significant risk of losing protective reflexes      e) able to maintain pre-procedure mobility without assistance   f) no nausea or dizziness      g) transportation arrangements that do not require patient to operate motor Vehicle.      N/A

## 2018-09-25 NOTE — ANESTHESIA POSTPROCEDURE EVALUATION
Department of Anesthesiology  Postprocedure Note    Patient: Chadwick Loera  MRN: [id number]  YOB: 1960  Date of evaluation: 9/25/2018  Time:  10:01 AM     Procedure Summary     Date:  09/25/18 [facility] / [facility]    Anesthesia Start:  0856 Anesthesia Stop:  7962    Procedure:  COLONOSCOPY (N/A ) Diagnosis:  (SCREENING)    Surgeon:  Gregorio Holland MD Responsible Provider:  OLIVER Sevilla CRNA    Anesthesia Type:  general, TIVA ASA Status:  3          Anesthesia Type: general, TIVA    Henry Phase I: Henry Score: 10    Henry Phase II:      Last vitals: Reviewed and per EMR flowsheets.        Anesthesia Post Evaluation    Patient location during evaluation: PACU  Patient participation: complete - patient participated  Level of consciousness: awake and alert  Pain score: 0  Airway patency: patent  Nausea & Vomiting: no nausea and no vomiting  Complications: no  Cardiovascular status: blood pressure returned to baseline  Respiratory status: acceptable  Hydration status: euvolemic

## 2018-09-28 ENCOUNTER — HOSPITAL ENCOUNTER (OUTPATIENT)
Dept: WOMENS IMAGING | Age: 58
Discharge: HOME | End: 2018-09-30
Payer: MEDICAID

## 2018-09-28 ENCOUNTER — HOSPITAL ENCOUNTER (OUTPATIENT)
Dept: ULTRASOUND IMAGING | Age: 58
Discharge: HOME | End: 2018-09-30
Payer: MEDICAID

## 2018-09-28 ENCOUNTER — TELEPHONE (OUTPATIENT)
Dept: PRIMARY CARE CLINIC | Age: 58
End: 2018-09-28

## 2018-09-28 DIAGNOSIS — E07.9 THYROID MASS: ICD-10-CM

## 2018-09-28 DIAGNOSIS — R22.1 NECK MASS: Primary | ICD-10-CM

## 2018-09-28 DIAGNOSIS — Z12.31 SCREENING MAMMOGRAM, ENCOUNTER FOR: ICD-10-CM

## 2018-09-28 PROCEDURE — 77067 SCR MAMMO BI INCL CAD: CPT

## 2018-09-28 PROCEDURE — 76536 US EXAM OF HEAD AND NECK: CPT

## 2018-10-05 DIAGNOSIS — I63.9 CEREBROVASCULAR ACCIDENT (CVA), UNSPECIFIED MECHANISM (HCC): ICD-10-CM

## 2018-10-05 DIAGNOSIS — F33.1 MODERATE EPISODE OF RECURRENT MAJOR DEPRESSIVE DISORDER (HCC): ICD-10-CM

## 2018-10-05 RX ORDER — GABAPENTIN 300 MG/1
300 CAPSULE ORAL 2 TIMES DAILY
Qty: 60 CAPSULE | Refills: 0 | Status: SHIPPED | OUTPATIENT
Start: 2018-10-05 | End: 2018-12-03

## 2018-10-09 ENCOUNTER — TELEPHONE (OUTPATIENT)
Dept: PRIMARY CARE CLINIC | Age: 58
End: 2018-10-09

## 2018-10-09 ENCOUNTER — HOSPITAL ENCOUNTER (OUTPATIENT)
Dept: CT IMAGING | Age: 58
Discharge: HOME | End: 2018-10-11
Payer: MEDICARE

## 2018-10-09 ENCOUNTER — TELEPHONE (OUTPATIENT)
Dept: NEUROLOGY | Age: 58
End: 2018-10-09

## 2018-10-09 DIAGNOSIS — Z01.812 PRE-PROCEDURAL LABORATORY EXAMINATIONS: Primary | ICD-10-CM

## 2018-10-09 DIAGNOSIS — R22.1 NECK MASS: ICD-10-CM

## 2018-10-09 PROCEDURE — 6360000004 HC RX CONTRAST MEDICATION: Performed by: NURSE PRACTITIONER

## 2018-10-09 PROCEDURE — 70491 CT SOFT TISSUE NECK W/DYE: CPT

## 2018-10-09 RX ADMIN — IOPAMIDOL 75 ML: 755 INJECTION, SOLUTION INTRAVENOUS at 13:39

## 2018-10-12 ENCOUNTER — TELEPHONE (OUTPATIENT)
Dept: PRIMARY CARE CLINIC | Age: 58
End: 2018-10-12

## 2018-10-12 NOTE — TELEPHONE ENCOUNTER
----- Message from [address] [place] [address], OLIVER - CNP sent at 10/12/2018  8:22 AM EDT -----  Results are normal, please call patient and make them aware.

## 2018-10-19 ENCOUNTER — HOSPITAL ENCOUNTER (OUTPATIENT)
Dept: LAB | Age: 58
Discharge: HOME | End: 2018-10-19
Payer: MEDICARE

## 2018-10-19 ENCOUNTER — HOSPITAL ENCOUNTER (OUTPATIENT)
Dept: MRI IMAGING | Age: 58
Discharge: HOME | End: 2018-10-21
Payer: MEDICARE

## 2018-10-19 DIAGNOSIS — R29.90 NEUROLOGICAL SYMPTOMS: ICD-10-CM

## 2018-10-19 DIAGNOSIS — Z01.812 PRE-PROCEDURAL LABORATORY EXAMINATIONS: ICD-10-CM

## 2018-10-19 LAB
BUN BLDV-MCNC: 11 MG/DL (ref 6–20)
CREAT SERPL-MCNC: 0.84 MG/DL (ref 0.5–0.9)
GFR AFRICAN AMERICAN: >60 ML/MIN
GFR NON-AFRICAN AMERICAN: >60 ML/MIN
GFR SERPL CREATININE-BSD FRML MDRD: NORMAL ML/MIN/{1.73_M2}
GFR SERPL CREATININE-BSD FRML MDRD: NORMAL ML/MIN/{1.73_M2}

## 2018-10-19 PROCEDURE — 6360000004 HC RX CONTRAST MEDICATION: Performed by: PSYCHIATRY & NEUROLOGY

## 2018-10-19 PROCEDURE — A9579 GAD-BASE MR CONTRAST NOS,1ML: HCPCS | Performed by: PSYCHIATRY & NEUROLOGY

## 2018-10-19 PROCEDURE — 36415 COLL VENOUS BLD VENIPUNCTURE: CPT

## 2018-10-19 PROCEDURE — 82565 ASSAY OF CREATININE: CPT

## 2018-10-19 PROCEDURE — 70553 MRI BRAIN STEM W/O & W/DYE: CPT

## 2018-10-19 PROCEDURE — 84520 ASSAY OF UREA NITROGEN: CPT

## 2018-10-19 RX ADMIN — GADOTERIDOL 18 ML: 279.3 INJECTION, SOLUTION INTRAVENOUS at 13:28

## 2018-10-21 PROBLEM — Z12.11 COLON CANCER SCREENING: Status: RESOLVED | Noted: 2018-09-05 | Resolved: 2018-10-21

## 2018-12-03 ENCOUNTER — OFFICE VISIT (OUTPATIENT)
Dept: PRIMARY CARE CLINIC | Age: 58
End: 2018-12-03
Payer: MEDICARE

## 2018-12-03 VITALS
RESPIRATION RATE: 16 BRPM | WEIGHT: 211.8 LBS | DIASTOLIC BLOOD PRESSURE: 78 MMHG | BODY MASS INDEX: 37.53 KG/M2 | TEMPERATURE: 97.1 F | SYSTOLIC BLOOD PRESSURE: 121 MMHG | HEART RATE: 71 BPM | HEIGHT: 63 IN

## 2018-12-03 DIAGNOSIS — F33.1 MODERATE EPISODE OF RECURRENT MAJOR DEPRESSIVE DISORDER (HCC): ICD-10-CM

## 2018-12-03 DIAGNOSIS — E88.81 METABOLIC SYNDROME: ICD-10-CM

## 2018-12-03 DIAGNOSIS — I10 ESSENTIAL HYPERTENSION: Primary | ICD-10-CM

## 2018-12-03 PROCEDURE — G8482 FLU IMMUNIZE ORDER/ADMIN: HCPCS | Performed by: NURSE PRACTITIONER

## 2018-12-03 PROCEDURE — G8417 CALC BMI ABV UP PARAM F/U: HCPCS | Performed by: NURSE PRACTITIONER

## 2018-12-03 PROCEDURE — G8427 DOCREV CUR MEDS BY ELIG CLIN: HCPCS | Performed by: NURSE PRACTITIONER

## 2018-12-03 PROCEDURE — 4004F PT TOBACCO SCREEN RCVD TLK: CPT | Performed by: NURSE PRACTITIONER

## 2018-12-03 PROCEDURE — 3017F COLORECTAL CA SCREEN DOC REV: CPT | Performed by: NURSE PRACTITIONER

## 2018-12-03 PROCEDURE — G8598 ASA/ANTIPLAT THER USED: HCPCS | Performed by: NURSE PRACTITIONER

## 2018-12-03 PROCEDURE — 99214 OFFICE O/P EST MOD 30 MIN: CPT | Performed by: NURSE PRACTITIONER

## 2018-12-03 RX ORDER — DILTIAZEM HYDROCHLORIDE 180 MG/1
180 CAPSULE, EXTENDED RELEASE ORAL DAILY
Qty: 30 CAPSULE | Refills: 3 | Status: SHIPPED | OUTPATIENT
Start: 2018-12-03 | End: 2019-06-04 | Stop reason: SDUPTHER

## 2018-12-03 RX ORDER — BUPROPION HYDROCHLORIDE 300 MG/1
300 TABLET ORAL EVERY MORNING
Qty: 90 TABLET | Refills: 1 | Status: SHIPPED | OUTPATIENT
Start: 2018-12-03 | End: 2019-06-30 | Stop reason: SDUPTHER

## 2018-12-03 ASSESSMENT — ENCOUNTER SYMPTOMS
VISUAL CHANGE: 0
ABDOMINAL PAIN: 0
CONSTIPATION: 0
WHEEZING: 0
RHINORRHEA: 0
NAUSEA: 0
SORE THROAT: 0
DIARRHEA: 0
SHORTNESS OF BREATH: 0
VOMITING: 0
COUGH: 0

## 2018-12-03 NOTE — PATIENT INSTRUCTIONS
about a medical condition or this instruction, always ask your healthcare professional. [address] [address] [address] any warranty or liability for your use of this information.

## 2018-12-13 ENCOUNTER — OFFICE VISIT (OUTPATIENT)
Dept: PRIMARY CARE CLINIC | Age: 58
End: 2018-12-13
Payer: MEDICARE

## 2018-12-13 VITALS
DIASTOLIC BLOOD PRESSURE: 85 MMHG | SYSTOLIC BLOOD PRESSURE: 126 MMHG | WEIGHT: 214.6 LBS | BODY MASS INDEX: 38.01 KG/M2 | HEART RATE: 79 BPM | TEMPERATURE: 98.2 F

## 2018-12-13 DIAGNOSIS — J01.40 ACUTE NON-RECURRENT PANSINUSITIS: Primary | ICD-10-CM

## 2018-12-13 DIAGNOSIS — J20.9 ACUTE BRONCHITIS, UNSPECIFIED ORGANISM: ICD-10-CM

## 2018-12-13 PROCEDURE — G8417 CALC BMI ABV UP PARAM F/U: HCPCS | Performed by: NURSE PRACTITIONER

## 2018-12-13 PROCEDURE — 99213 OFFICE O/P EST LOW 20 MIN: CPT | Performed by: NURSE PRACTITIONER

## 2018-12-13 PROCEDURE — 4004F PT TOBACCO SCREEN RCVD TLK: CPT | Performed by: NURSE PRACTITIONER

## 2018-12-13 PROCEDURE — G8598 ASA/ANTIPLAT THER USED: HCPCS | Performed by: NURSE PRACTITIONER

## 2018-12-13 PROCEDURE — G8482 FLU IMMUNIZE ORDER/ADMIN: HCPCS | Performed by: NURSE PRACTITIONER

## 2018-12-13 PROCEDURE — 3017F COLORECTAL CA SCREEN DOC REV: CPT | Performed by: NURSE PRACTITIONER

## 2018-12-13 PROCEDURE — G8427 DOCREV CUR MEDS BY ELIG CLIN: HCPCS | Performed by: NURSE PRACTITIONER

## 2018-12-13 RX ORDER — AMOXICILLIN AND CLAVULANATE POTASSIUM 875; 125 MG/1; MG/1
1 TABLET, FILM COATED ORAL 2 TIMES DAILY
Qty: 20 TABLET | Refills: 0 | Status: SHIPPED | OUTPATIENT
Start: 2018-12-13 | End: 2019-03-25 | Stop reason: SDUPTHER

## 2018-12-13 RX ORDER — BENZONATATE 200 MG/1
200 CAPSULE ORAL 3 TIMES DAILY PRN
Qty: 30 CAPSULE | Refills: 0 | Status: SHIPPED | OUTPATIENT
Start: 2018-12-13 | End: 2018-12-20

## 2018-12-13 RX ORDER — ATORVASTATIN CALCIUM 40 MG/1
40 TABLET, FILM COATED ORAL DAILY
Qty: 90 TABLET | Refills: 3 | Status: ON HOLD | OUTPATIENT
Start: 2018-12-13 | End: 2019-10-16 | Stop reason: HOSPADM

## 2018-12-13 ASSESSMENT — ENCOUNTER SYMPTOMS
COUGH: 1
SWOLLEN GLANDS: 0
WHEEZING: 0
DIARRHEA: 0
SORE THROAT: 1
VOMITING: 0
RHINORRHEA: 1
SINUS PAIN: 1
NAUSEA: 0
ABDOMINAL PAIN: 0

## 2018-12-18 ENCOUNTER — TELEPHONE (OUTPATIENT)
Dept: PRIMARY CARE CLINIC | Age: 58
End: 2018-12-18

## 2018-12-18 DIAGNOSIS — R19.7 DIARRHEA, UNSPECIFIED TYPE: Primary | ICD-10-CM

## 2018-12-18 NOTE — TELEPHONE ENCOUNTER
Spoke to patient and let her know it is most likely the Augmentin causing the diarrhea & that stool studies were ordered. Also to take OTC Immodium as needed, Patient verbalized understanding.

## 2018-12-26 ENCOUNTER — TELEPHONE (OUTPATIENT)
Dept: PRIMARY CARE CLINIC | Age: 58
End: 2018-12-26

## 2019-03-18 ENCOUNTER — OFFICE VISIT (OUTPATIENT)
Dept: PRIMARY CARE CLINIC | Age: 59
End: 2019-03-18
Payer: MEDICARE

## 2019-03-18 VITALS
HEART RATE: 87 BPM | SYSTOLIC BLOOD PRESSURE: 136 MMHG | TEMPERATURE: 98.4 F | RESPIRATION RATE: 20 BRPM | WEIGHT: 220.6 LBS | DIASTOLIC BLOOD PRESSURE: 98 MMHG | BODY MASS INDEX: 39.08 KG/M2

## 2019-03-18 DIAGNOSIS — J20.9 BRONCHITIS, ACUTE, WITH BRONCHOSPASM: Primary | ICD-10-CM

## 2019-03-18 PROCEDURE — G8482 FLU IMMUNIZE ORDER/ADMIN: HCPCS | Performed by: NURSE PRACTITIONER

## 2019-03-18 PROCEDURE — 3017F COLORECTAL CA SCREEN DOC REV: CPT | Performed by: NURSE PRACTITIONER

## 2019-03-18 PROCEDURE — 4004F PT TOBACCO SCREEN RCVD TLK: CPT | Performed by: NURSE PRACTITIONER

## 2019-03-18 PROCEDURE — G8417 CALC BMI ABV UP PARAM F/U: HCPCS | Performed by: NURSE PRACTITIONER

## 2019-03-18 PROCEDURE — 99213 OFFICE O/P EST LOW 20 MIN: CPT | Performed by: NURSE PRACTITIONER

## 2019-03-18 PROCEDURE — G8427 DOCREV CUR MEDS BY ELIG CLIN: HCPCS | Performed by: NURSE PRACTITIONER

## 2019-03-18 RX ORDER — GUAIFENESIN AND CODEINE PHOSPHATE 100; 10 MG/5ML; MG/5ML
5 SOLUTION ORAL EVERY 4 HOURS PRN
Qty: 1 BOTTLE | Refills: 0 | Status: SHIPPED | OUTPATIENT
Start: 2019-03-18 | End: 2019-03-25

## 2019-03-18 RX ORDER — ALBUTEROL SULFATE 90 UG/1
2 AEROSOL, METERED RESPIRATORY (INHALATION) EVERY 4 HOURS PRN
Qty: 1 INHALER | Refills: 0 | Status: SHIPPED | OUTPATIENT
Start: 2019-03-18 | End: 2020-06-10

## 2019-03-18 RX ORDER — BENZONATATE 100 MG/1
100-200 CAPSULE ORAL 3 TIMES DAILY PRN
Qty: 60 CAPSULE | Refills: 0 | Status: SHIPPED | OUTPATIENT
Start: 2019-03-18 | End: 2019-03-25

## 2019-03-18 RX ORDER — AZITHROMYCIN 250 MG/1
250 TABLET, FILM COATED ORAL SEE ADMIN INSTRUCTIONS
Qty: 6 TABLET | Refills: 0 | Status: SHIPPED | OUTPATIENT
Start: 2019-03-18 | End: 2019-03-23

## 2019-03-18 ASSESSMENT — ENCOUNTER SYMPTOMS
COUGH: 1
ALLERGIC/IMMUNOLOGIC NEGATIVE: 1
SINUS PRESSURE: 1
SORE THROAT: 1
EYES NEGATIVE: 1
GASTROINTESTINAL NEGATIVE: 1
WHEEZING: 1
RHINORRHEA: 1
SINUS PAIN: 1

## 2019-03-25 ENCOUNTER — OFFICE VISIT (OUTPATIENT)
Dept: PRIMARY CARE CLINIC | Age: 59
End: 2019-03-25
Payer: MEDICARE

## 2019-03-25 VITALS
BODY MASS INDEX: 39.08 KG/M2 | WEIGHT: 220.6 LBS | DIASTOLIC BLOOD PRESSURE: 96 MMHG | TEMPERATURE: 97.5 F | SYSTOLIC BLOOD PRESSURE: 139 MMHG | HEART RATE: 85 BPM

## 2019-03-25 DIAGNOSIS — J01.40 ACUTE NON-RECURRENT PANSINUSITIS: ICD-10-CM

## 2019-03-25 PROCEDURE — 4004F PT TOBACCO SCREEN RCVD TLK: CPT | Performed by: NURSE PRACTITIONER

## 2019-03-25 PROCEDURE — 3017F COLORECTAL CA SCREEN DOC REV: CPT | Performed by: NURSE PRACTITIONER

## 2019-03-25 PROCEDURE — G8482 FLU IMMUNIZE ORDER/ADMIN: HCPCS | Performed by: NURSE PRACTITIONER

## 2019-03-25 PROCEDURE — G8427 DOCREV CUR MEDS BY ELIG CLIN: HCPCS | Performed by: NURSE PRACTITIONER

## 2019-03-25 PROCEDURE — G8417 CALC BMI ABV UP PARAM F/U: HCPCS | Performed by: NURSE PRACTITIONER

## 2019-03-25 PROCEDURE — 99213 OFFICE O/P EST LOW 20 MIN: CPT | Performed by: NURSE PRACTITIONER

## 2019-03-25 RX ORDER — PREDNISONE 20 MG/1
40 TABLET ORAL DAILY
Qty: 10 TABLET | Refills: 0 | Status: SHIPPED | OUTPATIENT
Start: 2019-03-25 | End: 2019-03-30

## 2019-03-25 RX ORDER — AMOXICILLIN AND CLAVULANATE POTASSIUM 875; 125 MG/1; MG/1
1 TABLET, FILM COATED ORAL 2 TIMES DAILY
Qty: 20 TABLET | Refills: 0 | Status: SHIPPED | OUTPATIENT
Start: 2019-03-25 | End: 2019-04-04

## 2019-03-25 ASSESSMENT — ENCOUNTER SYMPTOMS
COUGH: 1
WHEEZING: 0
SWOLLEN GLANDS: 0
RHINORRHEA: 1
SORE THROAT: 1
SINUS PAIN: 1
NAUSEA: 0
VOMITING: 0
SINUS PRESSURE: 1
DIARRHEA: 0
TROUBLE SWALLOWING: 0
ABDOMINAL PAIN: 0

## 2019-04-07 DIAGNOSIS — I10 ESSENTIAL HYPERTENSION: ICD-10-CM

## 2019-04-08 RX ORDER — ATENOLOL 25 MG/1
TABLET ORAL
Qty: 90 TABLET | Refills: 1 | Status: SHIPPED | OUTPATIENT
Start: 2019-04-08 | End: 2019-12-09 | Stop reason: SDUPTHER

## 2019-04-08 NOTE — TELEPHONE ENCOUNTER
Health Maintenance   Topic Date Due    Hepatitis C screen  1960    Pneumococcal 0-64 years at Risk Vaccine (1 of 1 - PPSV23) 07/12/1965    HIV screen  07/12/1974    DTaP/Tdap/Td vaccine (1 - Tdap) 07/12/1978    Shingles Vaccine (1 of 2) 07/12/2009    Cervical cancer screen  12/03/2019 (Originally 7/12/1980)    Potassium monitoring  09/06/2019    Creatinine monitoring  10/19/2019    Breast cancer screen  09/28/2020    Diabetes screen  09/06/2021    Lipid screen  09/06/2023    Colon cancer screen colonoscopy  09/25/2028    Flu vaccine  Completed             (applicable per patient's age: Cancer Screenings, Depression Screening, Fall Risk Screening, Immunizations)    Hemoglobin A1C (%)   Date Value   09/06/2018 5.1     LDL Cholesterol (mg/dL)   Date Value   09/06/2018 90     AST (U/L)   Date Value   09/06/2018 15     ALT (U/L)   Date Value   09/06/2018 22     BUN (mg/dL)   Date Value   10/19/2018 11      (goal A1C is < 7)   (goal LDL is <100) need 30-50% reduction from baseline     BP Readings from Last 3 Encounters:   03/25/19 (!) 139/96   03/18/19 (!) 136/98   12/13/18 126/85    (goal /80)      All Future Testing planned in [facility]:  Lab Frequency Next Occurrence   COLONOSCOPY W/ OR W/O BIOPSY Once 09/05/2018   CBC Auto Differential Once 06/01/2019   Comprehensive Metabolic Panel Once 67/60/0487   Microalbumin, Ur Once 06/01/2019   Lipid Panel Once 06/01/2019   Hemoglobin A1C Once 06/01/2019       Next Visit Date:  Future Appointments   Date Time Provider Ryanne Mark   6/4/2019  9:20 AM Suhail Salgado, APRN - CNP [facility]            Patient Active Problem List:     Essential hypertension     Moderate episode of recurrent major depressive disorder ([facility] [address]     Gastroesophageal reflux disease     Neurological symptoms

## 2019-05-28 ENCOUNTER — HOSPITAL ENCOUNTER (OUTPATIENT)
Age: 59
Discharge: HOME | End: 2019-05-28
Payer: MEDICARE

## 2019-05-30 ENCOUNTER — TELEPHONE (OUTPATIENT)
Dept: PRIMARY CARE CLINIC | Age: 59
End: 2019-05-30

## 2019-05-30 ENCOUNTER — HOSPITAL ENCOUNTER (OUTPATIENT)
Age: 59
Discharge: HOME | End: 2019-05-30
Payer: MEDICARE

## 2019-05-30 DIAGNOSIS — E88.81 METABOLIC SYNDROME: ICD-10-CM

## 2019-05-30 DIAGNOSIS — K21.9 GASTROESOPHAGEAL REFLUX DISEASE, ESOPHAGITIS PRESENCE NOT SPECIFIED: ICD-10-CM

## 2019-05-30 DIAGNOSIS — I10 ESSENTIAL HYPERTENSION: ICD-10-CM

## 2019-05-30 LAB
ABSOLUTE EOS #: 0.2 K/UL (ref 0–0.44)
ABSOLUTE IMMATURE GRANULOCYTE: 0.05 K/UL (ref 0–0.3)
ABSOLUTE LYMPH #: 1.42 K/UL (ref 1.1–3.7)
ABSOLUTE MONO #: 0.47 K/UL (ref 0.1–1.2)
ALBUMIN SERPL-MCNC: 4.4 G/DL (ref 3.5–5.2)
ALBUMIN/GLOBULIN RATIO: 1.5 (ref 1–2.5)
ALP BLD-CCNC: 88 U/L (ref 35–104)
ALT SERPL-CCNC: 38 U/L (ref 5–33)
ANION GAP SERPL CALCULATED.3IONS-SCNC: 12 MMOL/L (ref 9–17)
AST SERPL-CCNC: 29 U/L
BASOPHILS # BLD: 1 % (ref 0–2)
BASOPHILS ABSOLUTE: 0.05 K/UL (ref 0–0.2)
BILIRUB SERPL-MCNC: 0.29 MG/DL (ref 0.3–1.2)
BUN BLDV-MCNC: 18 MG/DL (ref 6–20)
BUN/CREAT BLD: 22 (ref 9–20)
CALCIUM SERPL-MCNC: 9.6 MG/DL (ref 8.6–10.4)
CHLORIDE BLD-SCNC: 105 MMOL/L (ref 98–107)
CHOLESTEROL/HDL RATIO: 2.8
CHOLESTEROL: 179 MG/DL
CO2: 26 MMOL/L (ref 20–31)
CREAT SERPL-MCNC: 0.81 MG/DL (ref 0.5–0.9)
CREATININE URINE: 108.6 MG/DL (ref 28–217)
DIFFERENTIAL TYPE: ABNORMAL
EOSINOPHILS RELATIVE PERCENT: 4 % (ref 1–4)
ESTIMATED AVERAGE GLUCOSE: 105 MG/DL
GFR AFRICAN AMERICAN: >60 ML/MIN
GFR NON-AFRICAN AMERICAN: >60 ML/MIN
GFR SERPL CREATININE-BSD FRML MDRD: ABNORMAL ML/MIN/{1.73_M2}
GFR SERPL CREATININE-BSD FRML MDRD: ABNORMAL ML/MIN/{1.73_M2}
GLUCOSE BLD-MCNC: 104 MG/DL (ref 70–99)
HBA1C MFR BLD: 5.3 % (ref 4.8–5.9)
HCT VFR BLD CALC: 40.6 % (ref 36.3–47.1)
HDLC SERPL-MCNC: 63 MG/DL
HEMOGLOBIN: 13.3 G/DL (ref 11.9–15.1)
IMMATURE GRANULOCYTES: 1 %
LDL CHOLESTEROL: 79 MG/DL (ref 0–130)
LYMPHOCYTES # BLD: 25 % (ref 24–43)
MCH RBC QN AUTO: 32 PG (ref 25.2–33.5)
MCHC RBC AUTO-ENTMCNC: 32.8 G/DL (ref 28.4–34.8)
MCV RBC AUTO: 97.8 FL (ref 82.6–102.9)
MICROALBUMIN/CREAT 24H UR: <12 MG/L
MICROALBUMIN/CREAT UR-RTO: NORMAL MCG/MG CREAT
MONOCYTES # BLD: 8 % (ref 3–12)
NRBC AUTOMATED: 0 PER 100 WBC
PDW BLD-RTO: 13.5 % (ref 11.8–14.4)
PLATELET # BLD: 301 K/UL (ref 138–453)
PLATELET ESTIMATE: ABNORMAL
PMV BLD AUTO: 10.2 FL (ref 8.1–13.5)
POTASSIUM SERPL-SCNC: 4.6 MMOL/L (ref 3.7–5.3)
RBC # BLD: 4.15 M/UL (ref 3.95–5.11)
RBC # BLD: ABNORMAL 10*6/UL
SEG NEUTROPHILS: 61 % (ref 36–65)
SEGMENTED NEUTROPHILS ABSOLUTE COUNT: 3.58 K/UL (ref 1.5–8.1)
SODIUM BLD-SCNC: 143 MMOL/L (ref 135–144)
TOTAL PROTEIN: 7.3 G/DL (ref 6.4–8.3)
TRIGL SERPL-MCNC: 184 MG/DL
VLDLC SERPL CALC-MCNC: ABNORMAL MG/DL (ref 1–30)
WBC # BLD: 5.8 K/UL (ref 3.5–11.3)
WBC # BLD: ABNORMAL 10*3/UL

## 2019-05-30 PROCEDURE — 36415 COLL VENOUS BLD VENIPUNCTURE: CPT

## 2019-05-30 PROCEDURE — 82570 ASSAY OF URINE CREATININE: CPT

## 2019-05-30 PROCEDURE — 85025 COMPLETE CBC W/AUTO DIFF WBC: CPT

## 2019-05-30 PROCEDURE — 80061 LIPID PANEL: CPT

## 2019-05-30 PROCEDURE — 82043 UR ALBUMIN QUANTITATIVE: CPT

## 2019-05-30 PROCEDURE — 80053 COMPREHEN METABOLIC PANEL: CPT

## 2019-05-30 PROCEDURE — 83036 HEMOGLOBIN GLYCOSYLATED A1C: CPT

## 2019-05-30 RX ORDER — DILTIAZEM HYDROCHLORIDE 180 MG/1
CAPSULE, EXTENDED RELEASE ORAL
Qty: 90 CAPSULE | Refills: 1 | Status: SHIPPED | OUTPATIENT
Start: 2019-05-30 | End: 2019-12-09 | Stop reason: SDUPTHER

## 2019-05-30 RX ORDER — OMEPRAZOLE 20 MG/1
CAPSULE, DELAYED RELEASE ORAL
Qty: 90 CAPSULE | Refills: 1 | Status: SHIPPED | OUTPATIENT
Start: 2019-05-30 | End: 2019-12-09 | Stop reason: SDUPTHER

## 2019-05-30 NOTE — TELEPHONE ENCOUNTER
Health Maintenance   Topic Date Due    Hepatitis C screen  1960    Pneumococcal 0-64 years Vaccine (1 of 1 - PPSV23) 07/12/1965    HIV screen  07/12/1974    DTaP/Tdap/Td vaccine (1 - Tdap) 07/12/1978    Shingles Vaccine (1 of 2) 07/12/2009    Cervical cancer screen  12/03/2019 (Originally 7/12/1980)    Potassium monitoring  09/06/2019    Creatinine monitoring  10/19/2019    Breast cancer screen  09/28/2020    Diabetes screen  09/06/2021    Lipid screen  09/06/2023    Colon cancer screen colonoscopy  09/25/2028    Flu vaccine  Completed             (applicable per patient's age: Cancer Screenings, Depression Screening, Fall Risk Screening, Immunizations)    Hemoglobin A1C (%)   Date Value   05/30/2019 5.3   09/06/2018 5.1     Microalb/Crt.  Ratio (mcg/mg creat)   Date Value   05/30/2019 CANNOT BE CALCULATED     LDL Cholesterol (mg/dL)   Date Value   05/30/2019 79     AST (U/L)   Date Value   05/30/2019 29     ALT (U/L)   Date Value   05/30/2019 38 (H)     BUN (mg/dL)   Date Value   05/30/2019 18      (goal A1C is < 7)   (goal LDL is <100) need 30-50% reduction from baseline     BP Readings from Last 3 Encounters:   03/25/19 (!) 139/96   03/18/19 (!) 136/98   12/13/18 126/85    (goal /80)      All Future Testing planned in [facility]:  Lab Frequency Next Occurrence   COLONOSCOPY W/ OR W/O BIOPSY Once 09/05/2018       Next Visit Date:  Future Appointments   Date Time Provider Ryanne Mark   6/4/2019  9:20 AM Tejinder Salgado APRN - CNP [facility]            Patient Active Problem List:     Essential hypertension     Moderate episode of recurrent major depressive disorder ([address]     Gastroesophageal reflux disease     Neurological symptoms

## 2019-05-30 NOTE — TELEPHONE ENCOUNTER
Attempted to call patient and message left regarding need to get fasting labs done for upcoming appt.

## 2019-06-04 ENCOUNTER — OFFICE VISIT (OUTPATIENT)
Dept: PRIMARY CARE CLINIC | Age: 59
End: 2019-06-04
Payer: MEDICARE

## 2019-06-04 VITALS
BODY MASS INDEX: 40.17 KG/M2 | HEIGHT: 63 IN | DIASTOLIC BLOOD PRESSURE: 102 MMHG | SYSTOLIC BLOOD PRESSURE: 168 MMHG | WEIGHT: 226.7 LBS | RESPIRATION RATE: 16 BRPM | HEART RATE: 84 BPM | TEMPERATURE: 98.1 F

## 2019-06-04 DIAGNOSIS — F34.1 DYSTHYMIA: ICD-10-CM

## 2019-06-04 DIAGNOSIS — E88.81 METABOLIC SYNDROME: ICD-10-CM

## 2019-06-04 DIAGNOSIS — I10 ESSENTIAL HYPERTENSION: Primary | ICD-10-CM

## 2019-06-04 DIAGNOSIS — Z23 NEED FOR PNEUMOCOCCAL VACCINE: ICD-10-CM

## 2019-06-04 PROCEDURE — 4004F PT TOBACCO SCREEN RCVD TLK: CPT | Performed by: NURSE PRACTITIONER

## 2019-06-04 PROCEDURE — 90732 PPSV23 VACC 2 YRS+ SUBQ/IM: CPT | Performed by: NURSE PRACTITIONER

## 2019-06-04 PROCEDURE — 96160 PT-FOCUSED HLTH RISK ASSMT: CPT | Performed by: NURSE PRACTITIONER

## 2019-06-04 PROCEDURE — 90471 IMMUNIZATION ADMIN: CPT | Performed by: NURSE PRACTITIONER

## 2019-06-04 PROCEDURE — 99214 OFFICE O/P EST MOD 30 MIN: CPT | Performed by: NURSE PRACTITIONER

## 2019-06-04 PROCEDURE — G8417 CALC BMI ABV UP PARAM F/U: HCPCS | Performed by: NURSE PRACTITIONER

## 2019-06-04 PROCEDURE — 3017F COLORECTAL CA SCREEN DOC REV: CPT | Performed by: NURSE PRACTITIONER

## 2019-06-04 PROCEDURE — G8427 DOCREV CUR MEDS BY ELIG CLIN: HCPCS | Performed by: NURSE PRACTITIONER

## 2019-06-04 RX ORDER — SERTRALINE HYDROCHLORIDE 25 MG/1
25 TABLET, FILM COATED ORAL DAILY
Qty: 30 TABLET | Refills: 1 | Status: SHIPPED | OUTPATIENT
Start: 2019-06-04 | End: 2019-08-06 | Stop reason: SDUPTHER

## 2019-06-04 ASSESSMENT — PATIENT HEALTH QUESTIONNAIRE - PHQ9
6. FEELING BAD ABOUT YOURSELF - OR THAT YOU ARE A FAILURE OR HAVE LET YOURSELF OR YOUR FAMILY DOWN: 1
5. POOR APPETITE OR OVEREATING: 0
SUM OF ALL RESPONSES TO PHQ9 QUESTIONS 1 & 2: 4
SUM OF ALL RESPONSES TO PHQ QUESTIONS 1-9: 8
1. LITTLE INTEREST OR PLEASURE IN DOING THINGS: 1
2. FEELING DOWN, DEPRESSED OR HOPELESS: 3
7. TROUBLE CONCENTRATING ON THINGS, SUCH AS READING THE NEWSPAPER OR WATCHING TELEVISION: 0
4. FEELING TIRED OR HAVING LITTLE ENERGY: 3
10. IF YOU CHECKED OFF ANY PROBLEMS, HOW DIFFICULT HAVE THESE PROBLEMS MADE IT FOR YOU TO DO YOUR WORK, TAKE CARE OF THINGS AT HOME, OR GET ALONG WITH OTHER PEOPLE: 0
9. THOUGHTS THAT YOU WOULD BE BETTER OFF DEAD, OR OF HURTING YOURSELF: 0
SUM OF ALL RESPONSES TO PHQ QUESTIONS 1-9: 8
3. TROUBLE FALLING OR STAYING ASLEEP: 0
8. MOVING OR SPEAKING SO SLOWLY THAT OTHER PEOPLE COULD HAVE NOTICED. OR THE OPPOSITE, BEING SO FIGETY OR RESTLESS THAT YOU HAVE BEEN MOVING AROUND A LOT MORE THAN USUAL: 0

## 2019-06-04 ASSESSMENT — ENCOUNTER SYMPTOMS
SORE THROAT: 0
VISUAL CHANGE: 0
WHEEZING: 0
DIARRHEA: 0
CONSTIPATION: 0
VOMITING: 0
COUGH: 0
ABDOMINAL PAIN: 0
NAUSEA: 0
RHINORRHEA: 0
SHORTNESS OF BREATH: 0

## 2019-06-04 NOTE — PROGRESS NOTES
Name: Adwoa Thomas  : 1960         Chief Complaint:     Chief Complaint   Patient presents with    Hypertension     Routine office visit. Metabolic syndrome.  Mental Health Problem       History of Present Illness:      Adwoa Thomas is a 60 y.o.  female who presents with Hypertension (Routine office visit. Metabolic syndrome. ) and Mental Health Problem      Chava Mcclure is here today for a routine office visit. Metabolic syndrome-stable, labs improved, patient compliant with metformin. Patient encouraged to increase activity. 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 States she is having trouble sleeping and has constant worry. See below for further details. Hypertension   This is a chronic problem. The current episode started more than 1 year ago. The problem is unchanged. The problem is controlled. Pertinent negatives include no chest pain, headaches, neck pain, palpitations, peripheral edema or shortness of breath. There are no associated agents to hypertension. Risk factors for coronary artery disease include obesity. Past treatments include beta blockers and calcium channel blockers. The current treatment provides moderate improvement. Compliance problems include exercise and diet. Hypertensive end-organ damage includes CVA. There is no history of kidney disease, CAD/MI or heart failure. There is no history of chronic renal disease. Mental Health Problem   The primary symptoms include dysphoric mood. The primary symptoms do not include delusions, hallucinations, bizarre behavior, disorganized speech, negative symptoms or somatic symptoms. The current episode started more than 1 month ago. This is a chronic problem. The onset of the illness is precipitated by emotional stress. The degree of incapacity that she is experiencing as a consequence of her illness is moderate.  Sequelae of the illness include harmed interpersonal relations. Additional symptoms of the illness include anhedonia, insomnia, fatigue, agitation, attention impairment and distractible. Additional symptoms of the illness do not include hypersomnia, appetite change, unexpected weight change, psychomotor retardation, feelings of worthlessness, euphoric mood, increased goal-directed activity, flight of ideas, inflated self-esteem, decreased need for sleep, poor judgment, visual change, headaches, abdominal pain or seizures. She does not admit to suicidal ideas. She does not have a plan to commit suicide. She does not contemplate harming herself. She has not already injured self. She does not contemplate injuring another person. She has not already  injured another person. Risk factors that are present for mental illness include a history of mental illness. Past Medical History:     Past Medical History:   Diagnosis Date    Anxiety 2012    Broken bones 1985    Depression 2012    Gallbladder & bile duct stone with obstruction 2000    Hyperlipidemia 2012    Hypertension 2012    Numbness and tingling     generalized    Stroke (cerebrum) ([facility] [address] 2018      Reviewed all health maintenance requirements and ordered appropriate tests  There are no preventive care reminders to display for this patient. Past Surgical History:     Past Surgical History:   Procedure Laterality Date    ABDOMEN SURGERY      CHOLECYSTECTOMY      HERNIA REPAIR  2015    HYSTERECTOMY      ME COLON CA SCRN NOT  [address]  IND N/A 9/25/2018    -normal    TONSILLECTOMY AND ADENOIDECTOMY          Medications:       Prior to Admission medications    Medication Sig Start Date End Date Taking?  Authorizing Provider   sertraline (ZOLOFT) 25 MG tablet Take 1 tablet by mouth daily 6/4/19  Yes Chiquita Graves Might, APRN - CNP   diltiazem (TIAZAC) 180 MG extended release capsule TAKE ONE CAPSULE BY MOUTH DAILY 5/30/19  Yes Chiquita Graves Might, APRN - CNP   omeprazole (PRILOSEC) 20 MG delayed release capsule TAKE ONE CAPSULE BY MOUTH DAILY 5/30/19  Yes OLIVER Bains - JONATHAN   atenolol (TENORMIN) 25 MG tablet TAKE ONE TABLET BY MOUTH DAILY 4/8/19  Yes [de-identified] M OLIVER Carlos CNP   atorvastatin (LIPITOR) 40 MG tablet Take 1 tablet by mouth daily 12/13/18  Yes OLIVER Bains CNP   buPROPion (WELLBUTRIN XL) 300 MG extended release tablet Take 1 tablet by mouth every morning 12/3/18  Yes OLIVER Bains CNP   metFORMIN (GLUCOPHAGE) 500 MG tablet Take 1 tablet by mouth 2 times daily (with meals) 12/3/18  Yes OLIVER Bains CNP   melatonin 5 MG TABS tablet Take 5 mg by mouth daily   Yes Historical Provider, MD   aspirin 81 MG chewable tablet Take 81 mg by mouth daily   Yes Historical Provider, MD   albuterol sulfate  (90 Base) MCG/ACT inhaler Inhale 2 puffs into the lungs every 4 hours as needed for Wheezing 3/18/19 4/1/19  OLIVER Lloyd CNP        Allergies:       Patient has no known allergies. Social History:     Tobacco:    reports that she has been smoking. She has a 10.00 pack-year smoking history. She has never used smokeless tobacco.  Alcohol:      reports that she does not drink alcohol. Drug Use:  reports that she does not use drugs. Family History:     Family History   Problem Relation Age of Onset    Asthma Mother     Cancer Mother         Skin    Depression Mother     Hearing Loss Mother     High Cholesterol Mother     High Blood Pressure Mother     Osteoporosis Mother     Stroke Mother        Review of Systems:     Positive and Negative as described in HPI    Review of Systems   Constitutional: Positive for fatigue. Negative for appetite change, chills, fever and unexpected weight change. HENT: Negative for congestion, rhinorrhea and sore throat. Eyes: Negative for visual disturbance. Respiratory: Negative for cough, shortness of breath and wheezing. Cardiovascular: Negative for chest pain and palpitations.    Gastrointestinal: Negative for abdominal pain, constipation, diarrhea, nausea and vomiting. Genitourinary: Negative for difficulty urinating, dysuria and vaginal discharge. Musculoskeletal: Negative for gait problem, neck pain and neck stiffness. Skin: Negative for rash. Neurological: Negative for dizziness, seizures, syncope and headaches. Psychiatric/Behavioral: Positive for agitation, decreased concentration, dysphoric mood and sleep disturbance. Negative for behavioral problems, confusion, hallucinations, self-injury and suicidal ideas. The patient is nervous/anxious and has insomnia. The patient is not hyperactive. Physical Exam:   Vitals:  BP (!) 160/103 (Site: Right Upper Arm, Position: Sitting, Cuff Size: Large Adult)   Pulse 84   Temp 98.1 °F (36.7 °C) (Temporal)   Resp 16   Ht 5' 3\" (1.6 m)   Wt 226 lb 11.2 oz (102.8 kg)   BMI 40.16 kg/m²     Physical Exam   Constitutional: She is oriented to person, place, and time. She appears well-developed and well-nourished. No distress. HENT:   Mouth/Throat: Oropharynx is clear and moist.   Eyes: Conjunctivae are normal. No scleral icterus. Neck: Normal range of motion. Neck supple. Cardiovascular: Normal rate and regular rhythm. No murmur heard. Pulmonary/Chest: Effort normal and breath sounds normal.   Abdominal: Soft. Bowel sounds are normal. She exhibits no distension. There is no tenderness. Obese abdomen   Musculoskeletal: She exhibits no edema. Neurological: She is alert and oriented to person, place, and time. Skin: Skin is warm and dry. No rash noted. Psychiatric: Her speech is normal and behavior is normal. Judgment and thought content normal. Her mood appears anxious. Cognition and memory are normal. She exhibits a depressed mood. She expresses no homicidal and no suicidal ideation. She expresses no suicidal plans and no homicidal plans. Tearful at times She is attentive. Nursing note and vitals reviewed.       Data:     Lab Results   Component Value Date     05/30/2019    K 4.6 05/30/2019     05/30/2019    CO2 26 05/30/2019    BUN 18 05/30/2019    CREATININE 0.81 05/30/2019    GLUCOSE 104 05/30/2019    PROT 7.3 05/30/2019    LABALBU 4.4 05/30/2019    BILITOT 0.29 05/30/2019    ALKPHOS 88 05/30/2019    AST 29 05/30/2019    ALT 38 05/30/2019     Lab Results   Component Value Date    WBC 5.8 05/30/2019    RBC 4.15 05/30/2019    HGB 13.3 05/30/2019    HCT 40.6 05/30/2019    MCV 97.8 05/30/2019    MCH 32.0 05/30/2019    MCHC 32.8 05/30/2019    RDW 13.5 05/30/2019     05/30/2019    MPV 10.2 05/30/2019     Lab Results   Component Value Date    TSH 3.33 09/06/2018     Lab Results   Component Value Date    CHOL 179 05/30/2019    HDL 63 05/30/2019    LABA1C 5.3 05/30/2019       Assessment/Plan:      Diagnosis Orders   1. Essential hypertension     2. Dysthymia  sertraline (ZOLOFT) 25 MG tablet   3. Metabolic syndrome     4. Need for pneumococcal vaccine  PNEUMOVAX 23 subcutaneous/IM (Pneumococcal polysaccharide vaccine 23-valent >= 1yo)     2. Dysthymia- We will trial sertraline 25 mg daily, patient will call in a few weeks with results. 1.  Renata received counseling on the following healthy behaviors: nutrition, exercise and medication adherence  2. Patient given educational materials - see patient instructions  3. Was a self-tracking handout given in paper form or via [employer]? No  If yes, see orders or list here. 4.  Discussed use, benefit, and side effects of prescribed medications. Barriers to medication compliance addressed. All patient questions answered. Pt voiced understanding. 5.  Reviewed prior labs and health maintenance  6. Continue current medications, diet and exercise. Completed Refills   Requested Prescriptions     Signed Prescriptions Disp Refills    sertraline (ZOLOFT) 25 MG tablet 30 tablet 1     Sig: Take 1 tablet by mouth daily         Return in about 6 months (around 12/4/2019) for check up.

## 2019-06-04 NOTE — PATIENT INSTRUCTIONS
SURVEY:    You may be receiving a survey from [employer] regarding your visit today. Please complete the survey to enable us to provide the highest quality of care to you and your family. If you cannot score us a very good on any question, please call the office to discuss how we could have made your experience a very good one. Thank you. Patient Education        High Blood Pressure: Care Instructions  Overview    It's normal for blood pressure to go up and down throughout the day. But if it stays up, you have high blood pressure. Another name for high blood pressure is hypertension. Despite what a lot of people think, high blood pressure usually doesn't cause headaches or make you feel dizzy or lightheaded. It usually has no symptoms. But it does increase your risk of stroke, heart attack, and other problems. You and your doctor will talk about your risks of these problems based on your blood pressure. Your doctor will give you a goal for your blood pressure. Your goal will be based on your health and your age. Lifestyle changes, such as eating healthy and being active, are always important to help lower blood pressure. You might also take medicine to reach your blood pressure goal.  Follow-up care is a key part of your treatment and safety. Be sure to make and go to all appointments, and call your doctor if you are having problems. It's also a good idea to know your test results and keep a list of the medicines you take. How can you care for yourself at home? Medical treatment  · If you stop taking your medicine, your blood pressure will go back up. You may take one or more types of medicine to lower your blood pressure. Be safe with medicines. Take your medicine exactly as prescribed. Call your doctor if you think you are having a problem with your medicine. · Talk to your doctor before you start taking aspirin every day.  Aspirin can help certain people lower their risk of a heart attack or · You have symptoms of a heart attack. These may include:  ? Chest pain or pressure, or a strange feeling in the chest.  ? Sweating. ? Shortness of breath. ? Nausea or vomiting. ? Pain, pressure, or a strange feeling in the back, neck, jaw, or upper belly or in one or both shoulders or arms. ? Lightheadedness or sudden weakness. ? A fast or irregular heartbeat.     · You have symptoms of a stroke. These may include:  ? Sudden numbness, tingling, weakness, or loss of movement in your face, arm, or leg, especially on only one side of your body. ? Sudden vision changes. ? Sudden trouble speaking. ? Sudden confusion or trouble understanding simple statements. ? Sudden problems with walking or balance. ? A sudden, severe headache that is different from past headaches.     · You have severe back or belly pain.    Do not wait until your blood pressure comes down on its own. Get help right away.   Call your doctor now or seek immediate care if:    · Your blood pressure is much higher than normal (such as 180/120 or higher), but you don't have symptoms.     · You think high blood pressure is causing symptoms, such as:  ? Severe headache.  ? Blurry vision.    Watch closely for changes in your health, and be sure to contact your doctor if:    · Your blood pressure measures higher than your doctor recommends at least 2 times. That means the top number is higher or the bottom number is higher, or both.     · You think you may be having side effects from your blood pressure medicine. Where can you learn more? Go to https://TopFun[employer]. org and sign in to your [employer] account. Enter D945 in the [employer] box to learn more about \"High Blood Pressure: Care Instructions. \"     If you do not have an account, please click on the \"Sign Up Now\" link. Current as of: July 22, 2018  Content Version: 12.0  © 9707-9324 Healthwise, Incorporated.  Care instructions adapted under license by [facility]. If you have questions about a medical condition or this instruction, always ask your healthcare professional. [address] any warranty or liability for your use of this information. Patient Education        Pneumococcal Polysaccharide Vaccine: What You Need to Know  Why get vaccinated? Vaccination can protect older adults (and some children and younger adults) from pneumococcal disease. Pneumococcal disease is caused by bacteria that can spread from person to person through close contact. It can cause ear infections, and it can also lead to more serious infections of the:  · Lungs (pneumonia),  · Blood (bacteremia), and  · Covering of the brain and spinal cord (meningitis). Meningitis can cause deafness and brain damage, and it can be fatal.  Anyone can get pneumococcal disease, but children under 3years of age, people with certain medical conditions, adults over 72years of age, and cigarette smokers are at the highest risk. About 18,000 older adults die each year from pneumococcal disease in the [place]. Treatment of pneumococcal infections with penicillin and other drugs used to be more effective. But some strains of the disease have become resistant to these drugs. This makes prevention of the disease, through vaccination, even more important. Pneumococcal polysaccharide vaccine (PPSV23)  Pneumococcal polysaccharide vaccine (PPSV23) protects against 23 types of pneumococcal bacteria. It will not prevent all pneumococcal disease. PPSV23 is recommended for:  · All adults 72years of age and older,  · Anyone 2 through 59years of age with certain long-term health problems,  · Anyone 2 through 59years of age with a weakened immune system,  · Adults 23 through 59years of age who smoke cigarettes or have asthma. Most people need only one dose of PPSV. A second dose is recommended for certain high-risk groups.  People 72 and older should get a dose even if they have gotten one or more doses of the vaccine before they turned 65. Your healthcare provider can give you more information about these recommendations. Most healthy adults develop protection within 2 to 3 weeks of getting the shot. Some people should not get this vaccine  · Anyone who has had a life-threatening allergic reaction to PPSV should not get another dose. · Anyone who has a severe allergy to any component of PPSV should not receive it. Tell your provider if you have any severe allergies. · Anyone who is moderately or severely ill when the shot is scheduled may be asked to wait until they recover before getting the vaccine. Someone with a mild illness can usually be vaccinated. · Children less than 3years of age should not receive this vaccine. · There is no evidence that PPSV is harmful to either a pregnant woman or to her fetus. However, as a precaution, women who need the vaccine should be vaccinated before becoming pregnant, if possible. Risks of a vaccine reaction  With any medicine, including vaccines, there is a chance of side effects. These are usually mild and go away on their own, but serious reactions are also possible. About half of people who get PPSV have mild side effects, such as redness or pain where the shot is given, which go away within about two days. Less than 1 out of 100 people develop a fever, muscle aches, or more severe local reactions. Problems that could happen after any vaccine:  · People sometimes faint after a medical procedure, including vaccination. Sitting or lying down for about 15 minutes can help prevent fainting, and injuries caused by a fall. Tell your doctor if you feel dizzy, or have vision changes or ringing in the ears. · Some people get severe pain in the shoulder and have difficulty moving the arm where a shot was given. This happens very rarely. · Any medication can cause a severe allergic reaction.  Such reactions from a vaccine are very rare, estimated at about 1 in a million doses, and would happen within a few minutes to a few hours after the vaccination. As with any medicine, there is a very remote chance of a vaccine causing a serious injury or death. The safety of vaccines is always being monitored. For more information, visit: www.cdc.gov/vaccinesafety/  What if there is a serious reaction? What should I look for? Look for anything that concerns you, such as signs of a severe allergic reaction, very high fever, or unusual behavior. Signs of a severe allergic reaction can include hives, swelling of the face and throat, difficulty breathing, a fast heartbeat, dizziness, and weakness. These would usually start a few minutes to a few hours after the vaccination. What should I do? If you think it is a severe allergic reaction or other emergency that can't wait, call 9-1-1 or get to the nearest hospital. Otherwise, call your doctor. Afterward, the reaction should be reported to the Vaccine Adverse Event Reporting System (VAERS). Your doctor might file this report, or you can do it yourself through the VAERS web site at www.vaers. [facility].gov, or by calling 1-975.470.3401. VAERS does not give medical advice. How can I learn more? · Ask your doctor. He or she can give you the vaccine package insert or suggest other sources of information. · Call your local or state health department. · Contact the Centers for Disease Control and Prevention (CDC):  ? Call 2-889.106.7362 (1-800-CDC-INFO) or  ? Visit CDC's website at www.cdc.gov/vaccines  Vaccine Information Statement  PPSV Vaccine  (04/24/2015)  Department of Health and Human Services  Centers for Disease Control and Prevention  Many Vaccine Information Statements are available in English and other languages. See www.immunize.org/vis. Hojas de información Sobre Vacunas están disponibles en español y en muchos otros idiomas. Visite Jin.si.   Care instructions adapted under license by [facility] ([facility]). If you have questions about a medical condition or this instruction, always ask your healthcare professional. [place] [address] any warranty or liability for your use of this information.

## 2019-06-25 ENCOUNTER — NURSE ONLY (OUTPATIENT)
Dept: PRIMARY CARE CLINIC | Age: 59
End: 2019-06-25

## 2019-06-25 VITALS
HEART RATE: 73 BPM | HEIGHT: 63 IN | WEIGHT: 225.2 LBS | RESPIRATION RATE: 18 BRPM | BODY MASS INDEX: 39.9 KG/M2 | SYSTOLIC BLOOD PRESSURE: 129 MMHG | DIASTOLIC BLOOD PRESSURE: 79 MMHG | TEMPERATURE: 97.3 F

## 2019-06-25 DIAGNOSIS — I10 ESSENTIAL HYPERTENSION: Primary | ICD-10-CM

## 2019-08-06 DIAGNOSIS — F34.1 DYSTHYMIA: ICD-10-CM

## 2019-08-06 RX ORDER — SERTRALINE HYDROCHLORIDE 25 MG/1
TABLET, FILM COATED ORAL
Qty: 30 TABLET | Refills: 0 | Status: SHIPPED | OUTPATIENT
Start: 2019-08-06 | End: 2019-09-11 | Stop reason: SDUPTHER

## 2019-08-31 NOTE — TELEPHONE ENCOUNTER
----- Message from OLIVER Bautista CNP sent at 9/28/2018  4:22 PM EDT -----  No mammographic evidence of malignancy. Advise annual screening mammography. . Please relate to patient/parent. Thank you.    Yadi Benavidez Alert and oriented, no focal deficits, no motor or sensory deficits.

## 2019-10-14 ENCOUNTER — HOSPITAL ENCOUNTER (INPATIENT)
Age: 59
LOS: 2 days | Discharge: HOME | DRG: 244 | End: 2019-10-16
Attending: EMERGENCY MEDICINE | Admitting: INTERNAL MEDICINE
Payer: MEDICARE

## 2019-10-14 ENCOUNTER — APPOINTMENT (OUTPATIENT)
Dept: CT IMAGING | Age: 59
DRG: 244 | End: 2019-10-14
Payer: MEDICARE

## 2019-10-14 DIAGNOSIS — R10.30 LOWER ABDOMINAL PAIN: ICD-10-CM

## 2019-10-14 DIAGNOSIS — K57.32 DIVERTICULITIS OF COLON: Primary | ICD-10-CM

## 2019-10-14 PROBLEM — K57.92 ACUTE DIVERTICULITIS: Status: ACTIVE | Noted: 2019-10-14

## 2019-10-14 LAB
-: ABNORMAL
ABSOLUTE EOS #: 0.12 K/UL (ref 0–0.44)
ABSOLUTE IMMATURE GRANULOCYTE: 0.06 K/UL (ref 0–0.3)
ABSOLUTE LYMPH #: 1.08 K/UL (ref 1.1–3.7)
ABSOLUTE MONO #: 0.9 K/UL (ref 0.1–1.2)
ALBUMIN SERPL-MCNC: 4.5 G/DL (ref 3.5–5.2)
ALBUMIN/GLOBULIN RATIO: 1.5 (ref 1–2.5)
ALP BLD-CCNC: 97 U/L (ref 35–104)
ALT SERPL-CCNC: 43 U/L (ref 5–33)
AMORPHOUS: ABNORMAL
ANION GAP SERPL CALCULATED.3IONS-SCNC: 16 MMOL/L (ref 9–17)
AST SERPL-CCNC: 33 U/L
BACTERIA: ABNORMAL
BASOPHILS # BLD: 1 % (ref 0–2)
BASOPHILS ABSOLUTE: 0.06 K/UL (ref 0–0.2)
BILIRUB SERPL-MCNC: 0.5 MG/DL (ref 0.3–1.2)
BILIRUBIN URINE: NEGATIVE
BUN BLDV-MCNC: 13 MG/DL (ref 8–23)
BUN/CREAT BLD: 16 (ref 9–20)
CALCIUM SERPL-MCNC: 9.7 MG/DL (ref 8.6–10.4)
CASTS UA: ABNORMAL /LPF
CHLORIDE BLD-SCNC: 100 MMOL/L (ref 98–107)
CO2: 23 MMOL/L (ref 20–31)
COLOR: YELLOW
COMMENT UA: ABNORMAL
CREAT SERPL-MCNC: 0.8 MG/DL (ref 0.5–0.9)
CRYSTALS, UA: ABNORMAL /HPF
DIFFERENTIAL TYPE: ABNORMAL
EKG ATRIAL RATE: 74 BPM
EKG P AXIS: 54 DEGREES
EKG P-R INTERVAL: 132 MS
EKG Q-T INTERVAL: 422 MS
EKG QRS DURATION: 88 MS
EKG QTC CALCULATION (BAZETT): 468 MS
EKG R AXIS: -2 DEGREES
EKG T AXIS: 27 DEGREES
EKG VENTRICULAR RATE: 74 BPM
EOSINOPHILS RELATIVE PERCENT: 1 % (ref 1–4)
EPITHELIAL CELLS UA: ABNORMAL /HPF (ref 0–25)
GFR AFRICAN AMERICAN: >60 ML/MIN
GFR NON-AFRICAN AMERICAN: >60 ML/MIN
GFR SERPL CREATININE-BSD FRML MDRD: ABNORMAL ML/MIN/{1.73_M2}
GFR SERPL CREATININE-BSD FRML MDRD: ABNORMAL ML/MIN/{1.73_M2}
GLUCOSE BLD-MCNC: 117 MG/DL (ref 70–99)
GLUCOSE URINE: NEGATIVE
HCT VFR BLD CALC: 42.5 % (ref 36.3–47.1)
HEMOGLOBIN: 13.5 G/DL (ref 11.9–15.1)
IMMATURE GRANULOCYTES: 1 %
KETONES, URINE: NEGATIVE
LACTIC ACID, SEPSIS WHOLE BLOOD: NORMAL MMOL/L (ref 0.5–1.9)
LACTIC ACID, SEPSIS WHOLE BLOOD: NORMAL MMOL/L (ref 0.5–1.9)
LACTIC ACID, SEPSIS: 1.4 MMOL/L (ref 0.5–1.9)
LACTIC ACID, SEPSIS: 1.5 MMOL/L (ref 0.5–1.9)
LEUKOCYTE ESTERASE, URINE: NEGATIVE
LIPASE: 26 U/L (ref 13–60)
LYMPHOCYTES # BLD: 8 % (ref 24–43)
MCH RBC QN AUTO: 29.3 PG (ref 25.2–33.5)
MCHC RBC AUTO-ENTMCNC: 31.8 G/DL (ref 28.4–34.8)
MCV RBC AUTO: 92.4 FL (ref 82.6–102.9)
MONOCYTES # BLD: 7 % (ref 3–12)
MUCUS: ABNORMAL
NITRITE, URINE: NEGATIVE
NRBC AUTOMATED: 0 PER 100 WBC
OTHER OBSERVATIONS UA: ABNORMAL
PDW BLD-RTO: 13.6 % (ref 11.8–14.4)
PH UA: 7 (ref 5–9)
PLATELET # BLD: 276 K/UL (ref 138–453)
PLATELET ESTIMATE: ABNORMAL
PMV BLD AUTO: 9.5 FL (ref 8.1–13.5)
POTASSIUM SERPL-SCNC: 4.4 MMOL/L (ref 3.7–5.3)
PROTEIN UA: NEGATIVE
RBC # BLD: 4.6 M/UL (ref 3.95–5.11)
RBC # BLD: ABNORMAL 10*6/UL
RBC UA: ABNORMAL /HPF (ref 0–2)
RENAL EPITHELIAL, UA: ABNORMAL /HPF
SEG NEUTROPHILS: 82 % (ref 36–65)
SEGMENTED NEUTROPHILS ABSOLUTE COUNT: 10.61 K/UL (ref 1.5–8.1)
SODIUM BLD-SCNC: 139 MMOL/L (ref 135–144)
SPECIFIC GRAVITY UA: <1.005 (ref 1.01–1.02)
TOTAL PROTEIN: 7.5 G/DL (ref 6.4–8.3)
TRICHOMONAS: ABNORMAL
TURBIDITY: CLEAR
URINE HGB: NEGATIVE
UROBILINOGEN, URINE: NORMAL
WBC # BLD: 12.8 K/UL (ref 3.5–11.3)
WBC # BLD: ABNORMAL 10*3/UL
WBC UA: ABNORMAL /HPF (ref 0–5)
YEAST: ABNORMAL

## 2019-10-14 PROCEDURE — 6360000002 HC RX W HCPCS: Performed by: EMERGENCY MEDICINE

## 2019-10-14 PROCEDURE — 94664 DEMO&/EVAL PT USE INHALER: CPT

## 2019-10-14 PROCEDURE — 87040 BLOOD CULTURE FOR BACTERIA: CPT

## 2019-10-14 PROCEDURE — 6360000004 HC RX CONTRAST MEDICATION: Performed by: EMERGENCY MEDICINE

## 2019-10-14 PROCEDURE — 96365 THER/PROPH/DIAG IV INF INIT: CPT

## 2019-10-14 PROCEDURE — 80053 COMPREHEN METABOLIC PANEL: CPT

## 2019-10-14 PROCEDURE — 97165 OT EVAL LOW COMPLEX 30 MIN: CPT

## 2019-10-14 PROCEDURE — 83690 ASSAY OF LIPASE: CPT

## 2019-10-14 PROCEDURE — 6360000002 HC RX W HCPCS: Performed by: INTERNAL MEDICINE

## 2019-10-14 PROCEDURE — 93010 ELECTROCARDIOGRAM REPORT: CPT | Performed by: FAMILY MEDICINE

## 2019-10-14 PROCEDURE — 83605 ASSAY OF LACTIC ACID: CPT

## 2019-10-14 PROCEDURE — 2580000003 HC RX 258: Performed by: EMERGENCY MEDICINE

## 2019-10-14 PROCEDURE — 97161 PT EVAL LOW COMPLEX 20 MIN: CPT

## 2019-10-14 PROCEDURE — 6370000000 HC RX 637 (ALT 250 FOR IP): Performed by: INTERNAL MEDICINE

## 2019-10-14 PROCEDURE — 99285 EMERGENCY DEPT VISIT HI MDM: CPT

## 2019-10-14 PROCEDURE — 74177 CT ABD & PELVIS W/CONTRAST: CPT

## 2019-10-14 PROCEDURE — 1200000000 HC SEMI PRIVATE

## 2019-10-14 PROCEDURE — 36415 COLL VENOUS BLD VENIPUNCTURE: CPT

## 2019-10-14 PROCEDURE — 2580000003 HC RX 258: Performed by: INTERNAL MEDICINE

## 2019-10-14 PROCEDURE — 6360000002 HC RX W HCPCS: Performed by: PHYSICIAN ASSISTANT

## 2019-10-14 PROCEDURE — 96375 TX/PRO/DX INJ NEW DRUG ADDON: CPT

## 2019-10-14 PROCEDURE — 81001 URINALYSIS AUTO W/SCOPE: CPT

## 2019-10-14 PROCEDURE — 93005 ELECTROCARDIOGRAM TRACING: CPT | Performed by: INTERNAL MEDICINE

## 2019-10-14 PROCEDURE — 2500000003 HC RX 250 WO HCPCS: Performed by: EMERGENCY MEDICINE

## 2019-10-14 PROCEDURE — 85025 COMPLETE CBC W/AUTO DIFF WBC: CPT

## 2019-10-14 RX ORDER — ALBUTEROL SULFATE 90 UG/1
2 AEROSOL, METERED RESPIRATORY (INHALATION) EVERY 4 HOURS PRN
Status: DISCONTINUED | OUTPATIENT
Start: 2019-10-14 | End: 2019-10-16 | Stop reason: HOSPADM

## 2019-10-14 RX ORDER — DILTIAZEM HYDROCHLORIDE 180 MG/1
180 CAPSULE, COATED, EXTENDED RELEASE ORAL DAILY
Status: DISCONTINUED | OUTPATIENT
Start: 2019-10-15 | End: 2019-10-16 | Stop reason: HOSPADM

## 2019-10-14 RX ORDER — ONDANSETRON 2 MG/ML
4 INJECTION INTRAMUSCULAR; INTRAVENOUS ONCE
Status: COMPLETED | OUTPATIENT
Start: 2019-10-14 | End: 2019-10-14

## 2019-10-14 RX ORDER — ATENOLOL 25 MG/1
25 TABLET ORAL DAILY
Status: DISCONTINUED | OUTPATIENT
Start: 2019-10-15 | End: 2019-10-16 | Stop reason: HOSPADM

## 2019-10-14 RX ORDER — SODIUM CHLORIDE 0.9 % (FLUSH) 0.9 %
10 SYRINGE (ML) INJECTION EVERY 12 HOURS SCHEDULED
Status: DISCONTINUED | OUTPATIENT
Start: 2019-10-14 | End: 2019-10-16 | Stop reason: HOSPADM

## 2019-10-14 RX ORDER — SODIUM CHLORIDE 0.9 % (FLUSH) 0.9 %
10 SYRINGE (ML) INJECTION PRN
Status: DISCONTINUED | OUTPATIENT
Start: 2019-10-14 | End: 2019-10-16 | Stop reason: HOSPADM

## 2019-10-14 RX ORDER — 0.9 % SODIUM CHLORIDE 0.9 %
500 INTRAVENOUS SOLUTION INTRAVENOUS ONCE
Status: COMPLETED | OUTPATIENT
Start: 2019-10-14 | End: 2019-10-14

## 2019-10-14 RX ORDER — ONDANSETRON 2 MG/ML
4 INJECTION INTRAMUSCULAR; INTRAVENOUS EVERY 6 HOURS PRN
Status: DISCONTINUED | OUTPATIENT
Start: 2019-10-14 | End: 2019-10-16 | Stop reason: HOSPADM

## 2019-10-14 RX ORDER — CIPROFLOXACIN 2 MG/ML
400 INJECTION, SOLUTION INTRAVENOUS ONCE
Status: COMPLETED | OUTPATIENT
Start: 2019-10-14 | End: 2019-10-14

## 2019-10-14 RX ORDER — FENTANYL CITRATE 50 UG/ML
25 INJECTION, SOLUTION INTRAMUSCULAR; INTRAVENOUS ONCE
Status: COMPLETED | OUTPATIENT
Start: 2019-10-14 | End: 2019-10-14

## 2019-10-14 RX ORDER — ATORVASTATIN CALCIUM 40 MG/1
40 TABLET, FILM COATED ORAL NIGHTLY
Status: DISCONTINUED | OUTPATIENT
Start: 2019-10-14 | End: 2019-10-16 | Stop reason: HOSPADM

## 2019-10-14 RX ORDER — SODIUM CHLORIDE 9 MG/ML
INJECTION, SOLUTION INTRAVENOUS CONTINUOUS
Status: DISCONTINUED | OUTPATIENT
Start: 2019-10-14 | End: 2019-10-15

## 2019-10-14 RX ORDER — BUPROPION HYDROCHLORIDE 150 MG/1
300 TABLET ORAL DAILY
Status: DISCONTINUED | OUTPATIENT
Start: 2019-10-15 | End: 2019-10-16 | Stop reason: HOSPADM

## 2019-10-14 RX ORDER — MORPHINE SULFATE 2 MG/ML
2 INJECTION, SOLUTION INTRAMUSCULAR; INTRAVENOUS EVERY 4 HOURS PRN
Status: DISCONTINUED | OUTPATIENT
Start: 2019-10-14 | End: 2019-10-16 | Stop reason: HOSPADM

## 2019-10-14 RX ORDER — FAMOTIDINE 20 MG/1
20 TABLET, FILM COATED ORAL 2 TIMES DAILY
Status: DISCONTINUED | OUTPATIENT
Start: 2019-10-14 | End: 2019-10-16 | Stop reason: HOSPADM

## 2019-10-14 RX ADMIN — MORPHINE SULFATE 2 MG: 2 INJECTION, SOLUTION INTRAMUSCULAR; INTRAVENOUS at 13:27

## 2019-10-14 RX ADMIN — IOVERSOL 75 ML: 741 INJECTION INTRA-ARTERIAL; INTRAVENOUS at 09:39

## 2019-10-14 RX ADMIN — ENOXAPARIN SODIUM 40 MG: 40 INJECTION SUBCUTANEOUS at 13:22

## 2019-10-14 RX ADMIN — MORPHINE SULFATE 2 MG: 2 INJECTION, SOLUTION INTRAMUSCULAR; INTRAVENOUS at 17:26

## 2019-10-14 RX ADMIN — SODIUM CHLORIDE: 9 INJECTION, SOLUTION INTRAVENOUS at 13:15

## 2019-10-14 RX ADMIN — FENTANYL CITRATE 25 MCG: 50 INJECTION INTRAMUSCULAR; INTRAVENOUS at 09:22

## 2019-10-14 RX ADMIN — FAMOTIDINE 20 MG: 20 TABLET ORAL at 13:23

## 2019-10-14 RX ADMIN — PIPERACILLIN AND TAZOBACTAM 3.38 G: 3; .375 INJECTION, POWDER, FOR SOLUTION INTRAVENOUS at 13:21

## 2019-10-14 RX ADMIN — SODIUM CHLORIDE 500 ML: 9 INJECTION, SOLUTION INTRAVENOUS at 09:23

## 2019-10-14 RX ADMIN — METRONIDAZOLE 500 MG: 500 INJECTION, SOLUTION INTRAVENOUS at 11:11

## 2019-10-14 RX ADMIN — FAMOTIDINE 20 MG: 20 TABLET ORAL at 20:37

## 2019-10-14 RX ADMIN — MORPHINE SULFATE 2 MG: 2 INJECTION, SOLUTION INTRAMUSCULAR; INTRAVENOUS at 21:54

## 2019-10-14 RX ADMIN — ONDANSETRON 4 MG: 2 INJECTION INTRAMUSCULAR; INTRAVENOUS at 09:22

## 2019-10-14 RX ADMIN — CIPROFLOXACIN 400 MG: 2 INJECTION, SOLUTION INTRAVENOUS at 12:01

## 2019-10-14 RX ADMIN — ATORVASTATIN CALCIUM 40 MG: 40 TABLET, FILM COATED ORAL at 20:37

## 2019-10-14 RX ADMIN — PIPERACILLIN AND TAZOBACTAM 3.38 G: 3; .375 INJECTION, POWDER, FOR SOLUTION INTRAVENOUS at 20:37

## 2019-10-14 ASSESSMENT — PAIN DESCRIPTION - LOCATION
LOCATION: ABDOMEN

## 2019-10-14 ASSESSMENT — PAIN DESCRIPTION - PROGRESSION
CLINICAL_PROGRESSION: GRADUALLY IMPROVING
CLINICAL_PROGRESSION: NOT CHANGED

## 2019-10-14 ASSESSMENT — PAIN DESCRIPTION - FREQUENCY
FREQUENCY: CONTINUOUS

## 2019-10-14 ASSESSMENT — PAIN DESCRIPTION - ORIENTATION
ORIENTATION: LOWER
ORIENTATION: RIGHT;LEFT;LOWER;MID
ORIENTATION: LOWER

## 2019-10-14 ASSESSMENT — PAIN DESCRIPTION - DESCRIPTORS
DESCRIPTORS: ACHING;STABBING
DESCRIPTORS: SHARP
DESCRIPTORS: ACHING;STABBING
DESCRIPTORS: ACHING;STABBING
DESCRIPTORS: SHARP

## 2019-10-14 ASSESSMENT — PAIN - FUNCTIONAL ASSESSMENT: PAIN_FUNCTIONAL_ASSESSMENT: PREVENTS OR INTERFERES WITH ALL ACTIVE AND SOME PASSIVE ACTIVITIES

## 2019-10-14 ASSESSMENT — PAIN SCALES - GENERAL
PAINLEVEL_OUTOF10: 5
PAINLEVEL_OUTOF10: 4
PAINLEVEL_OUTOF10: 3
PAINLEVEL_OUTOF10: 7
PAINLEVEL_OUTOF10: 5
PAINLEVEL_OUTOF10: 6
PAINLEVEL_OUTOF10: 7
PAINLEVEL_OUTOF10: 7
PAINLEVEL_OUTOF10: 5
PAINLEVEL_OUTOF10: 5

## 2019-10-14 ASSESSMENT — PAIN DESCRIPTION - PAIN TYPE
TYPE: ACUTE PAIN

## 2019-10-14 ASSESSMENT — PAIN DESCRIPTION - ONSET
ONSET: ON-GOING
ONSET: ON-GOING

## 2019-10-15 LAB
ABSOLUTE EOS #: 0.09 K/UL (ref 0–0.44)
ABSOLUTE IMMATURE GRANULOCYTE: 0.04 K/UL (ref 0–0.3)
ABSOLUTE LYMPH #: 1.09 K/UL (ref 1.1–3.7)
ABSOLUTE MONO #: 0.71 K/UL (ref 0.1–1.2)
ALBUMIN SERPL-MCNC: 3.6 G/DL (ref 3.5–5.2)
ALBUMIN/GLOBULIN RATIO: 1.4 (ref 1–2.5)
ALP BLD-CCNC: 76 U/L (ref 35–104)
ALT SERPL-CCNC: 28 U/L (ref 5–33)
ANION GAP SERPL CALCULATED.3IONS-SCNC: 13 MMOL/L (ref 9–17)
AST SERPL-CCNC: 16 U/L
BASOPHILS # BLD: 1 % (ref 0–2)
BASOPHILS ABSOLUTE: 0.05 K/UL (ref 0–0.2)
BILIRUB SERPL-MCNC: 0.58 MG/DL (ref 0.3–1.2)
BUN BLDV-MCNC: 8 MG/DL (ref 8–23)
BUN/CREAT BLD: 10 (ref 9–20)
CALCIUM SERPL-MCNC: 8.6 MG/DL (ref 8.6–10.4)
CHLORIDE BLD-SCNC: 103 MMOL/L (ref 98–107)
CO2: 22 MMOL/L (ref 20–31)
CREAT SERPL-MCNC: 0.79 MG/DL (ref 0.5–0.9)
DIFFERENTIAL TYPE: ABNORMAL
EOSINOPHILS RELATIVE PERCENT: 1 % (ref 1–4)
GFR AFRICAN AMERICAN: >60 ML/MIN
GFR NON-AFRICAN AMERICAN: >60 ML/MIN
GFR SERPL CREATININE-BSD FRML MDRD: ABNORMAL ML/MIN/{1.73_M2}
GFR SERPL CREATININE-BSD FRML MDRD: ABNORMAL ML/MIN/{1.73_M2}
GLUCOSE BLD-MCNC: 113 MG/DL (ref 70–99)
HCT VFR BLD CALC: 36.2 % (ref 36.3–47.1)
HEMOGLOBIN: 11.4 G/DL (ref 11.9–15.1)
IMMATURE GRANULOCYTES: 0 %
LYMPHOCYTES # BLD: 11 % (ref 24–43)
MCH RBC QN AUTO: 29.6 PG (ref 25.2–33.5)
MCHC RBC AUTO-ENTMCNC: 31.5 G/DL (ref 28.4–34.8)
MCV RBC AUTO: 94 FL (ref 82.6–102.9)
MONOCYTES # BLD: 7 % (ref 3–12)
NRBC AUTOMATED: 0 PER 100 WBC
PDW BLD-RTO: 13.9 % (ref 11.8–14.4)
PLATELET # BLD: 216 K/UL (ref 138–453)
PLATELET ESTIMATE: ABNORMAL
PMV BLD AUTO: 9.5 FL (ref 8.1–13.5)
POTASSIUM SERPL-SCNC: 3.7 MMOL/L (ref 3.7–5.3)
RBC # BLD: 3.85 M/UL (ref 3.95–5.11)
RBC # BLD: ABNORMAL 10*6/UL
SEG NEUTROPHILS: 80 % (ref 36–65)
SEGMENTED NEUTROPHILS ABSOLUTE COUNT: 7.78 K/UL (ref 1.5–8.1)
SODIUM BLD-SCNC: 138 MMOL/L (ref 135–144)
TOTAL PROTEIN: 6.2 G/DL (ref 6.4–8.3)
WBC # BLD: 9.8 K/UL (ref 3.5–11.3)
WBC # BLD: ABNORMAL 10*3/UL

## 2019-10-15 PROCEDURE — 6370000000 HC RX 637 (ALT 250 FOR IP): Performed by: INTERNAL MEDICINE

## 2019-10-15 PROCEDURE — 6360000002 HC RX W HCPCS: Performed by: INTERNAL MEDICINE

## 2019-10-15 PROCEDURE — 97110 THERAPEUTIC EXERCISES: CPT

## 2019-10-15 PROCEDURE — 6360000002 HC RX W HCPCS: Performed by: PHYSICIAN ASSISTANT

## 2019-10-15 PROCEDURE — 2580000003 HC RX 258: Performed by: INTERNAL MEDICINE

## 2019-10-15 PROCEDURE — 80053 COMPREHEN METABOLIC PANEL: CPT

## 2019-10-15 PROCEDURE — 99253 IP/OBS CNSLTJ NEW/EST LOW 45: CPT | Performed by: SURGERY

## 2019-10-15 PROCEDURE — 1200000000 HC SEMI PRIVATE

## 2019-10-15 PROCEDURE — 36415 COLL VENOUS BLD VENIPUNCTURE: CPT

## 2019-10-15 PROCEDURE — 85025 COMPLETE CBC W/AUTO DIFF WBC: CPT

## 2019-10-15 RX ORDER — TEMAZEPAM 7.5 MG/1
7.5 CAPSULE ORAL NIGHTLY PRN
Status: DISCONTINUED | OUTPATIENT
Start: 2019-10-15 | End: 2019-10-15 | Stop reason: CLARIF

## 2019-10-15 RX ORDER — LORAZEPAM 0.5 MG/1
0.5 TABLET ORAL NIGHTLY PRN
Status: DISCONTINUED | OUTPATIENT
Start: 2019-10-15 | End: 2019-10-16 | Stop reason: HOSPADM

## 2019-10-15 RX ADMIN — LORAZEPAM 0.5 MG: 0.5 TABLET ORAL at 21:14

## 2019-10-15 RX ADMIN — DILTIAZEM HYDROCHLORIDE 180 MG: 180 CAPSULE, COATED, EXTENDED RELEASE ORAL at 08:04

## 2019-10-15 RX ADMIN — ATENOLOL 25 MG: 25 TABLET ORAL at 08:04

## 2019-10-15 RX ADMIN — MORPHINE SULFATE 2 MG: 2 INJECTION, SOLUTION INTRAMUSCULAR; INTRAVENOUS at 09:34

## 2019-10-15 RX ADMIN — MORPHINE SULFATE 2 MG: 2 INJECTION, SOLUTION INTRAMUSCULAR; INTRAVENOUS at 18:46

## 2019-10-15 RX ADMIN — SODIUM CHLORIDE: 9 INJECTION, SOLUTION INTRAVENOUS at 03:18

## 2019-10-15 RX ADMIN — BUPROPION HYDROCHLORIDE 300 MG: 150 TABLET, FILM COATED, EXTENDED RELEASE ORAL at 08:04

## 2019-10-15 RX ADMIN — PIPERACILLIN AND TAZOBACTAM 3.38 G: 3; .375 INJECTION, POWDER, FOR SOLUTION INTRAVENOUS at 20:10

## 2019-10-15 RX ADMIN — Medication 10 ML: at 20:11

## 2019-10-15 RX ADMIN — PIPERACILLIN AND TAZOBACTAM 3.38 G: 3; .375 INJECTION, POWDER, FOR SOLUTION INTRAVENOUS at 04:59

## 2019-10-15 RX ADMIN — PIPERACILLIN AND TAZOBACTAM 3.38 G: 3; .375 INJECTION, POWDER, FOR SOLUTION INTRAVENOUS at 13:51

## 2019-10-15 RX ADMIN — ENOXAPARIN SODIUM 40 MG: 40 INJECTION SUBCUTANEOUS at 08:04

## 2019-10-15 RX ADMIN — ATORVASTATIN CALCIUM 40 MG: 40 TABLET, FILM COATED ORAL at 20:10

## 2019-10-15 RX ADMIN — FAMOTIDINE 20 MG: 20 TABLET ORAL at 08:04

## 2019-10-15 RX ADMIN — MORPHINE SULFATE 2 MG: 2 INJECTION, SOLUTION INTRAMUSCULAR; INTRAVENOUS at 03:21

## 2019-10-15 RX ADMIN — MORPHINE SULFATE 2 MG: 2 INJECTION, SOLUTION INTRAMUSCULAR; INTRAVENOUS at 13:59

## 2019-10-15 RX ADMIN — FAMOTIDINE 20 MG: 20 TABLET ORAL at 20:10

## 2019-10-15 ASSESSMENT — PAIN SCALES - GENERAL
PAINLEVEL_OUTOF10: 6
PAINLEVEL_OUTOF10: 6
PAINLEVEL_OUTOF10: 0
PAINLEVEL_OUTOF10: 6
PAINLEVEL_OUTOF10: 5
PAINLEVEL_OUTOF10: 6
PAINLEVEL_OUTOF10: 0

## 2019-10-15 ASSESSMENT — PAIN DESCRIPTION - ORIENTATION
ORIENTATION: LOWER

## 2019-10-15 ASSESSMENT — PAIN DESCRIPTION - LOCATION
LOCATION: ABDOMEN

## 2019-10-15 ASSESSMENT — PAIN DESCRIPTION - PAIN TYPE
TYPE: ACUTE PAIN

## 2019-10-15 ASSESSMENT — PAIN DESCRIPTION - DESCRIPTORS
DESCRIPTORS: ACHING;STABBING

## 2019-10-15 ASSESSMENT — PAIN DESCRIPTION - FREQUENCY
FREQUENCY: INTERMITTENT
FREQUENCY: INTERMITTENT

## 2019-10-15 ASSESSMENT — PAIN DESCRIPTION - PROGRESSION: CLINICAL_PROGRESSION: GRADUALLY IMPROVING

## 2019-10-16 VITALS
DIASTOLIC BLOOD PRESSURE: 81 MMHG | SYSTOLIC BLOOD PRESSURE: 132 MMHG | RESPIRATION RATE: 16 BRPM | WEIGHT: 227.9 LBS | BODY MASS INDEX: 40.38 KG/M2 | HEART RATE: 75 BPM | HEIGHT: 63 IN | TEMPERATURE: 97.8 F | OXYGEN SATURATION: 93 %

## 2019-10-16 LAB
ABSOLUTE EOS #: 0.17 K/UL (ref 0–0.44)
ABSOLUTE IMMATURE GRANULOCYTE: 0 K/UL (ref 0–0.3)
ABSOLUTE LYMPH #: 1.65 K/UL (ref 1.1–3.7)
ABSOLUTE MONO #: 0.51 K/UL (ref 0.1–1.2)
ALBUMIN SERPL-MCNC: 3.6 G/DL (ref 3.5–5.2)
ALBUMIN/GLOBULIN RATIO: 1.2 (ref 1–2.5)
ALP BLD-CCNC: 75 U/L (ref 35–104)
ALT SERPL-CCNC: 24 U/L (ref 5–33)
ANION GAP SERPL CALCULATED.3IONS-SCNC: 15 MMOL/L (ref 9–17)
AST SERPL-CCNC: 15 U/L
BASOPHILS # BLD: 0 % (ref 0–2)
BASOPHILS ABSOLUTE: 0 K/UL (ref 0–0.2)
BILIRUB SERPL-MCNC: 0.35 MG/DL (ref 0.3–1.2)
BUN BLDV-MCNC: 8 MG/DL (ref 8–23)
BUN/CREAT BLD: 11 (ref 9–20)
CALCIUM SERPL-MCNC: 8.8 MG/DL (ref 8.6–10.4)
CHLORIDE BLD-SCNC: 103 MMOL/L (ref 98–107)
CO2: 21 MMOL/L (ref 20–31)
CREAT SERPL-MCNC: 0.72 MG/DL (ref 0.5–0.9)
DIFFERENTIAL TYPE: ABNORMAL
EOSINOPHILS RELATIVE PERCENT: 3 % (ref 1–4)
GFR AFRICAN AMERICAN: >60 ML/MIN
GFR NON-AFRICAN AMERICAN: >60 ML/MIN
GFR SERPL CREATININE-BSD FRML MDRD: ABNORMAL ML/MIN/{1.73_M2}
GFR SERPL CREATININE-BSD FRML MDRD: ABNORMAL ML/MIN/{1.73_M2}
GLUCOSE BLD-MCNC: 105 MG/DL (ref 70–99)
HCT VFR BLD CALC: 36.2 % (ref 36.3–47.1)
HEMOGLOBIN: 11.3 G/DL (ref 11.9–15.1)
IMMATURE GRANULOCYTES: 0 %
LYMPHOCYTES # BLD: 29 % (ref 24–43)
MAGNESIUM: 2.4 MG/DL (ref 1.6–2.6)
MCH RBC QN AUTO: 29.3 PG (ref 25.2–33.5)
MCHC RBC AUTO-ENTMCNC: 31.2 G/DL (ref 28.4–34.8)
MCV RBC AUTO: 93.8 FL (ref 82.6–102.9)
MONOCYTES # BLD: 9 % (ref 3–12)
MORPHOLOGY: NORMAL
NRBC AUTOMATED: 0 PER 100 WBC
PDW BLD-RTO: 13.7 % (ref 11.8–14.4)
PLATELET # BLD: 209 K/UL (ref 138–453)
PLATELET ESTIMATE: ABNORMAL
PMV BLD AUTO: 9.7 FL (ref 8.1–13.5)
POTASSIUM SERPL-SCNC: 3.5 MMOL/L (ref 3.7–5.3)
RBC # BLD: 3.86 M/UL (ref 3.95–5.11)
RBC # BLD: ABNORMAL 10*6/UL
SEG NEUTROPHILS: 59 % (ref 36–65)
SEGMENTED NEUTROPHILS ABSOLUTE COUNT: 3.37 K/UL (ref 1.5–8.1)
SODIUM BLD-SCNC: 139 MMOL/L (ref 135–144)
TOTAL PROTEIN: 6.6 G/DL (ref 6.4–8.3)
WBC # BLD: 5.7 K/UL (ref 3.5–11.3)
WBC # BLD: ABNORMAL 10*3/UL

## 2019-10-16 PROCEDURE — 90686 IIV4 VACC NO PRSV 0.5 ML IM: CPT | Performed by: INTERNAL MEDICINE

## 2019-10-16 PROCEDURE — 6360000002 HC RX W HCPCS: Performed by: PHYSICIAN ASSISTANT

## 2019-10-16 PROCEDURE — 6370000000 HC RX 637 (ALT 250 FOR IP): Performed by: INTERNAL MEDICINE

## 2019-10-16 PROCEDURE — 80053 COMPREHEN METABOLIC PANEL: CPT

## 2019-10-16 PROCEDURE — 36415 COLL VENOUS BLD VENIPUNCTURE: CPT

## 2019-10-16 PROCEDURE — G0008 ADMIN INFLUENZA VIRUS VAC: HCPCS | Performed by: INTERNAL MEDICINE

## 2019-10-16 PROCEDURE — 83735 ASSAY OF MAGNESIUM: CPT

## 2019-10-16 PROCEDURE — 6360000002 HC RX W HCPCS: Performed by: INTERNAL MEDICINE

## 2019-10-16 PROCEDURE — 97530 THERAPEUTIC ACTIVITIES: CPT

## 2019-10-16 PROCEDURE — 85025 COMPLETE CBC W/AUTO DIFF WBC: CPT

## 2019-10-16 PROCEDURE — 2580000003 HC RX 258: Performed by: INTERNAL MEDICINE

## 2019-10-16 RX ORDER — AMOXICILLIN AND CLAVULANATE POTASSIUM 875; 125 MG/1; MG/1
1 TABLET, FILM COATED ORAL 2 TIMES DAILY
Qty: 10 TABLET | Refills: 0 | Status: SHIPPED | OUTPATIENT
Start: 2019-10-16 | End: 2019-10-21

## 2019-10-16 RX ORDER — ATORVASTATIN CALCIUM 40 MG/1
40 TABLET, FILM COATED ORAL NIGHTLY
Qty: 30 TABLET | Refills: 3 | Status: SHIPPED | OUTPATIENT
Start: 2019-10-16 | End: 2019-10-16

## 2019-10-16 RX ORDER — ATORVASTATIN CALCIUM 40 MG/1
40 TABLET, FILM COATED ORAL NIGHTLY
Qty: 30 TABLET | Refills: 0
Start: 2019-10-16 | End: 2019-12-09 | Stop reason: SDUPTHER

## 2019-10-16 RX ORDER — BUPROPION HYDROCHLORIDE 300 MG/1
300 TABLET ORAL DAILY
Qty: 30 TABLET | Refills: 3 | Status: SHIPPED | OUTPATIENT
Start: 2019-10-16 | End: 2019-10-16

## 2019-10-16 RX ORDER — BUPROPION HYDROCHLORIDE 300 MG/1
300 TABLET ORAL DAILY
Qty: 30 TABLET | Refills: 0
Start: 2019-10-16 | End: 2019-12-09 | Stop reason: SDUPTHER

## 2019-10-16 RX ORDER — DILTIAZEM HYDROCHLORIDE 180 MG/1
180 CAPSULE, COATED, EXTENDED RELEASE ORAL DAILY
Qty: 30 CAPSULE | Refills: 3 | Status: SHIPPED | OUTPATIENT
Start: 2019-10-16 | End: 2019-10-16 | Stop reason: HOSPADM

## 2019-10-16 RX ADMIN — ATENOLOL 25 MG: 25 TABLET ORAL at 09:05

## 2019-10-16 RX ADMIN — INFLUENZA A VIRUS A/BRISBANE/02/2018 IVR-190 (H1N1) ANTIGEN (PROPIOLACTONE INACTIVATED), INFLUENZA A VIRUS A/KANSAS/14/2017 X-327 (H3N2) ANTIGEN (PROPIOLACTONE INACTIVATED), INFLUENZA B VIRUS B/MARYLAND/15/2016 ANTIGEN (PROPIOLACTONE INACTIVATED), INFLUENZA B VIRUS B/PHUKET/3073/2013 BVR-1B ANTIGEN (PROPIOLACTONE INACTIVATED) 0.5 ML: 15; 15; 15; 15 INJECTION, SUSPENSION INTRAMUSCULAR at 10:28

## 2019-10-16 RX ADMIN — MORPHINE SULFATE 2 MG: 2 INJECTION, SOLUTION INTRAMUSCULAR; INTRAVENOUS at 04:14

## 2019-10-16 RX ADMIN — PIPERACILLIN AND TAZOBACTAM 3.38 G: 3; .375 INJECTION, POWDER, FOR SOLUTION INTRAVENOUS at 04:14

## 2019-10-16 RX ADMIN — MORPHINE SULFATE 2 MG: 2 INJECTION, SOLUTION INTRAMUSCULAR; INTRAVENOUS at 09:05

## 2019-10-16 RX ADMIN — DILTIAZEM HYDROCHLORIDE 180 MG: 180 CAPSULE, COATED, EXTENDED RELEASE ORAL at 09:05

## 2019-10-16 RX ADMIN — BUPROPION HYDROCHLORIDE 300 MG: 150 TABLET, FILM COATED, EXTENDED RELEASE ORAL at 09:05

## 2019-10-16 RX ADMIN — ENOXAPARIN SODIUM 40 MG: 40 INJECTION SUBCUTANEOUS at 09:05

## 2019-10-16 RX ADMIN — FAMOTIDINE 20 MG: 20 TABLET ORAL at 09:05

## 2019-10-16 RX ADMIN — Medication 10 ML: at 09:06

## 2019-10-16 RX ADMIN — MORPHINE SULFATE 2 MG: 2 INJECTION, SOLUTION INTRAMUSCULAR; INTRAVENOUS at 00:11

## 2019-10-16 ASSESSMENT — PAIN SCALES - GENERAL
PAINLEVEL_OUTOF10: 5

## 2019-10-16 ASSESSMENT — PAIN DESCRIPTION - DESCRIPTORS
DESCRIPTORS: STABBING
DESCRIPTORS: STABBING

## 2019-10-16 ASSESSMENT — PAIN DESCRIPTION - ORIENTATION
ORIENTATION: LOWER
ORIENTATION: LOWER

## 2019-10-16 ASSESSMENT — PAIN DESCRIPTION - PAIN TYPE
TYPE: ACUTE PAIN
TYPE: ACUTE PAIN

## 2019-10-16 ASSESSMENT — PAIN DESCRIPTION - LOCATION
LOCATION: ABDOMEN
LOCATION: ABDOMEN

## 2019-10-16 ASSESSMENT — PAIN DESCRIPTION - FREQUENCY
FREQUENCY: INTERMITTENT
FREQUENCY: INTERMITTENT

## 2019-10-17 ENCOUNTER — TELEPHONE (OUTPATIENT)
Dept: PRIMARY CARE CLINIC | Age: 59
End: 2019-10-17

## 2019-10-18 ENCOUNTER — TELEPHONE (OUTPATIENT)
Dept: PRIMARY CARE CLINIC | Age: 59
End: 2019-10-18

## 2019-10-19 LAB
CULTURE: NORMAL
CULTURE: NORMAL
Lab: NORMAL
Lab: NORMAL
SPECIMEN DESCRIPTION: NORMAL
SPECIMEN DESCRIPTION: NORMAL

## 2019-10-28 ENCOUNTER — OFFICE VISIT (OUTPATIENT)
Dept: PRIMARY CARE CLINIC | Age: 59
End: 2019-10-28
Payer: MEDICARE

## 2019-10-28 VITALS
SYSTOLIC BLOOD PRESSURE: 131 MMHG | TEMPERATURE: 97.8 F | DIASTOLIC BLOOD PRESSURE: 84 MMHG | HEIGHT: 63 IN | RESPIRATION RATE: 18 BRPM | WEIGHT: 222.6 LBS | HEART RATE: 80 BPM | BODY MASS INDEX: 39.44 KG/M2

## 2019-10-28 DIAGNOSIS — K57.92 ACUTE DIVERTICULITIS: Primary | ICD-10-CM

## 2019-10-28 DIAGNOSIS — F34.1 DYSTHYMIA: ICD-10-CM

## 2019-10-28 PROCEDURE — 99214 OFFICE O/P EST MOD 30 MIN: CPT | Performed by: NURSE PRACTITIONER

## 2019-10-28 PROCEDURE — 1111F DSCHRG MED/CURRENT MED MERGE: CPT | Performed by: NURSE PRACTITIONER

## 2019-10-28 RX ORDER — BUPROPION HYDROCHLORIDE 150 MG/1
150 TABLET ORAL EVERY MORNING
Qty: 90 TABLET | Refills: 3 | Status: SHIPPED | OUTPATIENT
Start: 2019-10-28 | End: 2020-10-21

## 2019-10-30 ENCOUNTER — TELEPHONE (OUTPATIENT)
Dept: PRIMARY CARE CLINIC | Age: 59
End: 2019-10-30

## 2019-12-09 ENCOUNTER — OFFICE VISIT (OUTPATIENT)
Dept: PRIMARY CARE CLINIC | Age: 59
End: 2019-12-09
Payer: MEDICARE

## 2019-12-09 VITALS
SYSTOLIC BLOOD PRESSURE: 137 MMHG | BODY MASS INDEX: 39.04 KG/M2 | HEART RATE: 76 BPM | WEIGHT: 220.3 LBS | HEIGHT: 63 IN | RESPIRATION RATE: 16 BRPM | TEMPERATURE: 97 F | DIASTOLIC BLOOD PRESSURE: 75 MMHG

## 2019-12-09 DIAGNOSIS — I10 ESSENTIAL HYPERTENSION: Primary | ICD-10-CM

## 2019-12-09 DIAGNOSIS — F34.1 DYSTHYMIA: ICD-10-CM

## 2019-12-09 DIAGNOSIS — E78.5 DYSLIPIDEMIA: ICD-10-CM

## 2019-12-09 PROCEDURE — G8427 DOCREV CUR MEDS BY ELIG CLIN: HCPCS | Performed by: NURSE PRACTITIONER

## 2019-12-09 PROCEDURE — G8417 CALC BMI ABV UP PARAM F/U: HCPCS | Performed by: NURSE PRACTITIONER

## 2019-12-09 PROCEDURE — 1036F TOBACCO NON-USER: CPT | Performed by: NURSE PRACTITIONER

## 2019-12-09 PROCEDURE — 99214 OFFICE O/P EST MOD 30 MIN: CPT | Performed by: NURSE PRACTITIONER

## 2019-12-09 PROCEDURE — G8482 FLU IMMUNIZE ORDER/ADMIN: HCPCS | Performed by: NURSE PRACTITIONER

## 2019-12-09 PROCEDURE — 3017F COLORECTAL CA SCREEN DOC REV: CPT | Performed by: NURSE PRACTITIONER

## 2019-12-09 RX ORDER — DILTIAZEM HYDROCHLORIDE 180 MG/1
CAPSULE, EXTENDED RELEASE ORAL
Qty: 90 CAPSULE | Refills: 1 | Status: SHIPPED | OUTPATIENT
Start: 2019-12-09 | End: 2020-06-18 | Stop reason: SDUPTHER

## 2019-12-09 RX ORDER — OMEPRAZOLE 20 MG/1
CAPSULE, DELAYED RELEASE ORAL
Qty: 90 CAPSULE | Refills: 1 | Status: SHIPPED | OUTPATIENT
Start: 2019-12-09 | End: 2020-06-29

## 2019-12-09 RX ORDER — ATORVASTATIN CALCIUM 40 MG/1
40 TABLET, FILM COATED ORAL NIGHTLY
Qty: 90 TABLET | Refills: 1 | Status: SHIPPED | OUTPATIENT
Start: 2019-12-09 | End: 2020-07-29

## 2019-12-09 RX ORDER — BUPROPION HYDROCHLORIDE 300 MG/1
300 TABLET ORAL DAILY
Qty: 90 TABLET | Refills: 1 | Status: SHIPPED | OUTPATIENT
Start: 2019-12-09 | End: 2020-06-18 | Stop reason: SDUPTHER

## 2019-12-09 RX ORDER — ATENOLOL 25 MG/1
TABLET ORAL
Qty: 90 TABLET | Refills: 1 | Status: SHIPPED | OUTPATIENT
Start: 2019-12-09 | End: 2020-05-28

## 2019-12-09 ASSESSMENT — ENCOUNTER SYMPTOMS
CONSTIPATION: 0
VISUAL CHANGE: 0
ABDOMINAL PAIN: 0
VOMITING: 0
ORTHOPNEA: 0
COUGH: 0
BLURRED VISION: 0
RHINORRHEA: 0
SORE THROAT: 0
SHORTNESS OF BREATH: 0
NAUSEA: 0
WHEEZING: 0
DIARRHEA: 0

## 2020-01-02 ENCOUNTER — OFFICE VISIT (OUTPATIENT)
Dept: PRIMARY CARE CLINIC | Age: 60
End: 2020-01-02
Payer: MEDICARE

## 2020-01-02 VITALS
HEART RATE: 72 BPM | WEIGHT: 222.5 LBS | TEMPERATURE: 98.6 F | DIASTOLIC BLOOD PRESSURE: 81 MMHG | BODY MASS INDEX: 39.41 KG/M2 | SYSTOLIC BLOOD PRESSURE: 115 MMHG | RESPIRATION RATE: 22 BRPM | OXYGEN SATURATION: 97 %

## 2020-01-02 PROCEDURE — 99213 OFFICE O/P EST LOW 20 MIN: CPT | Performed by: NURSE PRACTITIONER

## 2020-01-02 PROCEDURE — 3017F COLORECTAL CA SCREEN DOC REV: CPT | Performed by: NURSE PRACTITIONER

## 2020-01-02 PROCEDURE — 1036F TOBACCO NON-USER: CPT | Performed by: NURSE PRACTITIONER

## 2020-01-02 PROCEDURE — G8417 CALC BMI ABV UP PARAM F/U: HCPCS | Performed by: NURSE PRACTITIONER

## 2020-01-02 PROCEDURE — G8427 DOCREV CUR MEDS BY ELIG CLIN: HCPCS | Performed by: NURSE PRACTITIONER

## 2020-01-02 PROCEDURE — G8482 FLU IMMUNIZE ORDER/ADMIN: HCPCS | Performed by: NURSE PRACTITIONER

## 2020-01-02 RX ORDER — BENZONATATE 200 MG/1
200 CAPSULE ORAL 3 TIMES DAILY PRN
Qty: 21 CAPSULE | Refills: 0 | Status: SHIPPED | OUTPATIENT
Start: 2020-01-02 | End: 2020-01-09

## 2020-01-02 RX ORDER — AZITHROMYCIN 250 MG/1
250 TABLET, FILM COATED ORAL SEE ADMIN INSTRUCTIONS
Qty: 6 TABLET | Refills: 0 | Status: SHIPPED | OUTPATIENT
Start: 2020-01-02 | End: 2020-01-07

## 2020-01-02 RX ORDER — GUAIFENESIN 600 MG/1
600 TABLET, EXTENDED RELEASE ORAL 2 TIMES DAILY
Qty: 30 TABLET | Refills: 0 | Status: SHIPPED | OUTPATIENT
Start: 2020-01-02 | End: 2020-01-17

## 2020-01-02 RX ORDER — ALBUTEROL SULFATE 90 UG/1
2 AEROSOL, METERED RESPIRATORY (INHALATION) 4 TIMES DAILY PRN
Qty: 1 INHALER | Refills: 0 | Status: SHIPPED | OUTPATIENT
Start: 2020-01-02 | End: 2020-03-20

## 2020-01-02 ASSESSMENT — ENCOUNTER SYMPTOMS
SINUS PRESSURE: 0
NAUSEA: 0
PHOTOPHOBIA: 0
VOMITING: 0
COUGH: 1
ABDOMINAL PAIN: 0
SINUS COMPLAINT: 1
DIARRHEA: 0
RHINORRHEA: 1
SINUS PAIN: 0
FACIAL SWELLING: 0
EYE REDNESS: 0
SORE THROAT: 1
SHORTNESS OF BREATH: 1
TROUBLE SWALLOWING: 0
WHEEZING: 1

## 2020-01-02 NOTE — PROGRESS NOTES
[facility] PHYSICIANS  [facility] PRIMARY CARE [place] [address] [address] [facility]  Dept: [phone]  Dept Fax: [phone]    Dedra Garcia is a 60 y.o. female who presentsto the [facility] in Care today for her medical conditions/complaints as noted below. Dedra Garcia is c/o of Chest Congestion (X 3 days) and Sinus Problem      HPI:     Nelson Perez is here today for a walk in visit. Cough   This is a new problem. The current episode started in the past 7 days. The problem has been unchanged. The cough is non-productive. Associated symptoms include nasal congestion, rhinorrhea, a sore throat, shortness of breath and wheezing. Pertinent negatives include no chest pain, chills, ear congestion, eye redness, fever, postnasal drip or rash. Risk factors for lung disease include smoking/tobacco exposure. Treatments tried: nyquil and tylenol and cough drops. The treatment provided mild relief. There is no history of asthma, COPD, emphysema or pneumonia.        Past Medical History:   Diagnosis Date    Anxiety 2012    Broken bones 1985    Depression 2012    Gallbladder & bile duct stone with obstruction 2000    Hyperlipidemia 2012    Hypertension 2012    Numbness and tingling     generalized    Stroke (cerebrum) ([facility] 2018        Current Outpatient Medications   Medication Sig Dispense Refill    azithromycin (ZITHROMAX) 250 MG tablet Take 1 tablet by mouth See Admin Instructions for 5 days 500mg on day 1 followed by 250mg on days 2 - 5 6 tablet 0    albuterol sulfate  (90 Base) MCG/ACT inhaler Inhale 2 puffs into the lungs 4 times daily as needed for Wheezing 1 Inhaler 0    guaiFENesin (MUCINEX) 600 MG extended release tablet Take 1 tablet by mouth 2 times daily for 15 days 30 tablet 0    benzonatate (TESSALON) 200 MG capsule Take 1 capsule by mouth 3 times daily as needed for Cough 21 capsule 0    atorvastatin (LIPITOR) 40 MG tablet Take 1 tablet by mouth nightly 90 tablet 1    buPROPion (WELLBUTRIN XL) 300 MG extended release tablet Take 1 tablet by mouth daily 90 tablet 1    diltiazem (TIAZAC) 180 MG extended release capsule TAKE ONE CAPSULE BY MOUTH DAILY 90 capsule 1    omeprazole (PRILOSEC) 20 MG delayed release capsule TAKE ONE CAPSULE BY MOUTH DAILY 90 capsule 1    atenolol (TENORMIN) 25 MG tablet TAKE ONE TABLET BY MOUTH DAILY 90 tablet 1    sertraline (ZOLOFT) 50 MG tablet Take 1 tablet by mouth daily 90 tablet 3    buPROPion (WELLBUTRIN XL) 150 MG extended release tablet Take 1 tablet by mouth every morning 90 tablet 3    metFORMIN (GLUCOPHAGE) 500 MG tablet TAKE ONE TABLET BY MOUTH TWICE A DAY WITH MEALS 180 tablet 3    aspirin 81 MG chewable tablet Take 81 mg by mouth daily      albuterol sulfate  (90 Base) MCG/ACT inhaler Inhale 2 puffs into the lungs every 4 hours as needed for Wheezing 1 Inhaler 0     No current facility-administered medications for this visit. No Known Allergies    Subjective:      Review of Systems   Constitutional: Positive for fatigue. Negative for activity change, appetite change, chills and fever. HENT: Positive for rhinorrhea and sore throat. Negative for congestion, facial swelling, hearing loss, postnasal drip, sinus pressure, sinus pain and trouble swallowing. Eyes: Negative for photophobia, redness and visual disturbance. Respiratory: Positive for cough, shortness of breath and wheezing. Cardiovascular: Negative for chest pain and palpitations. Gastrointestinal: Negative for abdominal pain, diarrhea, nausea and vomiting. Genitourinary: Negative for decreased urine volume. Skin: Negative for rash and wound. Objective:     Physical Exam  Vitals signs and nursing note reviewed. Exam conducted with a chaperone present. Constitutional:       General: She is not in acute distress. Appearance: Normal appearance. She is not ill-appearing, toxic-appearing or diaphoretic. HENT:      Right Ear:  There is no impacted cerumen. Left Ear: There is no impacted cerumen. Nose: Congestion present. Mouth/Throat:      Pharynx: No oropharyngeal exudate or posterior oropharyngeal erythema. Eyes:      General:         Right eye: No discharge. Left eye: No discharge. Conjunctiva/sclera: Conjunctivae normal.   Neck:      Musculoskeletal: Normal range of motion and neck supple. Cardiovascular:      Rate and Rhythm: Normal rate and regular rhythm. Pulses: Normal pulses. Heart sounds: No murmur. Pulmonary:      Effort: Pulmonary effort is normal. No respiratory distress. Breath sounds: No stridor. Wheezing (scattered wheezing) and rales (left lower quadrant) present. No rhonchi. Lymphadenopathy:      Cervical: No cervical adenopathy. Neurological:      Mental Status: She is alert. /81   Pulse 72   Temp 98.6 °F (37 °C) (Temporal)   Resp 22   Wt 222 lb 8 oz (100.9 kg)   SpO2 97%   BMI 39.41 kg/m²     Assessment:      Diagnosis Orders   1. Atypical pneumonia  azithromycin (ZITHROMAX) 250 MG tablet    albuterol sulfate  (90 Base) MCG/ACT inhaler    guaiFENesin (MUCINEX) 600 MG extended release tablet    benzonatate (TESSALON) 200 MG capsule     Patient presents with cough x3 to 4 days. She does report some mild shortness of breath and some wheezing. She does have a history of tobacco abuse but did quit smoking 3 to 4 months ago. Physical exam she does have some scattered wheezing and rales are noted in her left lower quadrant. She does not appear ill, will treat for atypical pneumonia. Plan:     Reviewed exam findings and plan of care as well as supportive management as listed below with patient at bedside. · Start Zithromax  albuterol MDI as prescribed. Encouraged to take antibiotic (if prescribed) with probiotic and food to decrease GI side effects. Reviewed administration and side effects.     · Supportive management encouraged including Tylenol over-the-counter as instructed on packaging for fever and discomfort. Cool mist humidifiers. Hot tea with honey and lemon for cough as needed. · Instructions pertinent to diagnoses and treatment plan given with AVS.  · Encouraged to follow up to ER for any difficulty breathing, shortness of breath or inability to swallow including hives and fevers above 103°. · Follow-up at this office or PCP if no improvement. Return if symptoms worsen or fail to improve. Orders Placed This Encounter   Medications    azithromycin (ZITHROMAX) 250 MG tablet     Sig: Take 1 tablet by mouth See Admin Instructions for 5 days 500mg on day 1 followed by 250mg on days 2 - 5     Dispense:  6 tablet     Refill:  0    albuterol sulfate  (90 Base) MCG/ACT inhaler     Sig: Inhale 2 puffs into the lungs 4 times daily as needed for Wheezing     Dispense:  1 Inhaler     Refill:  0    guaiFENesin (MUCINEX) 600 MG extended release tablet     Sig: Take 1 tablet by mouth 2 times daily for 15 days     Dispense:  30 tablet     Refill:  0    benzonatate (TESSALON) 200 MG capsule     Sig: Take 1 capsule by mouth 3 times daily as needed for Cough     Dispense:  21 capsule     Refill:  0          All patient questions answered. Pt voiced understanding.       Electronically signed by OLIVER Smith CNP on 1/2/2020 at 1:53 PM

## 2020-02-13 ENCOUNTER — OFFICE VISIT (OUTPATIENT)
Dept: PRIMARY CARE CLINIC | Age: 60
End: 2020-02-13
Payer: MEDICARE

## 2020-02-13 VITALS
BODY MASS INDEX: 39.92 KG/M2 | RESPIRATION RATE: 18 BRPM | TEMPERATURE: 97.4 F | HEIGHT: 63 IN | DIASTOLIC BLOOD PRESSURE: 70 MMHG | OXYGEN SATURATION: 98 % | HEART RATE: 78 BPM | SYSTOLIC BLOOD PRESSURE: 134 MMHG | WEIGHT: 225.3 LBS

## 2020-02-13 PROCEDURE — G8417 CALC BMI ABV UP PARAM F/U: HCPCS | Performed by: NURSE PRACTITIONER

## 2020-02-13 PROCEDURE — 3017F COLORECTAL CA SCREEN DOC REV: CPT | Performed by: NURSE PRACTITIONER

## 2020-02-13 PROCEDURE — 1036F TOBACCO NON-USER: CPT | Performed by: NURSE PRACTITIONER

## 2020-02-13 PROCEDURE — G8427 DOCREV CUR MEDS BY ELIG CLIN: HCPCS | Performed by: NURSE PRACTITIONER

## 2020-02-13 PROCEDURE — 99214 OFFICE O/P EST MOD 30 MIN: CPT | Performed by: NURSE PRACTITIONER

## 2020-02-13 PROCEDURE — G8926 SPIRO NO PERF OR DOC: HCPCS | Performed by: NURSE PRACTITIONER

## 2020-02-13 PROCEDURE — 3023F SPIROM DOC REV: CPT | Performed by: NURSE PRACTITIONER

## 2020-02-13 PROCEDURE — G8482 FLU IMMUNIZE ORDER/ADMIN: HCPCS | Performed by: NURSE PRACTITIONER

## 2020-02-13 RX ORDER — AMOXICILLIN AND CLAVULANATE POTASSIUM 875; 125 MG/1; MG/1
1 TABLET, FILM COATED ORAL 2 TIMES DAILY
Qty: 20 TABLET | Refills: 0 | Status: SHIPPED | OUTPATIENT
Start: 2020-02-13 | End: 2020-02-23

## 2020-02-13 RX ORDER — NEBULIZER ACCESSORIES
1 KIT MISCELLANEOUS DAILY PRN
Qty: 1 KIT | Refills: 0 | Status: SHIPPED | OUTPATIENT
Start: 2020-02-13

## 2020-02-13 RX ORDER — BENZONATATE 200 MG/1
200 CAPSULE ORAL 3 TIMES DAILY PRN
Qty: 20 CAPSULE | Refills: 0 | Status: SHIPPED | OUTPATIENT
Start: 2020-02-13 | End: 2020-02-20

## 2020-02-13 RX ORDER — PREDNISONE 20 MG/1
TABLET ORAL
Qty: 11 TABLET | Refills: 0 | Status: SHIPPED | OUTPATIENT
Start: 2020-02-13 | End: 2020-02-22

## 2020-02-13 RX ORDER — ALBUTEROL SULFATE 2.5 MG/3ML
2.5 SOLUTION RESPIRATORY (INHALATION) EVERY 4 HOURS PRN
Qty: 120 EACH | Refills: 3 | Status: SHIPPED | OUTPATIENT
Start: 2020-02-13 | End: 2021-11-30

## 2020-02-13 ASSESSMENT — ENCOUNTER SYMPTOMS
SORE THROAT: 1
SINUS PRESSURE: 1
SINUS PAIN: 1
RHINORRHEA: 1
HEARTBURN: 0
WHEEZING: 1
HEMOPTYSIS: 0
NAUSEA: 0
BACK PAIN: 0
VOMITING: 0
DIARRHEA: 0
ABDOMINAL PAIN: 0
SWOLLEN GLANDS: 0
COUGH: 1
SHORTNESS OF BREATH: 1
TROUBLE SWALLOWING: 0

## 2020-02-13 NOTE — PATIENT INSTRUCTIONS
breathing.    Call your doctor now or seek immediate medical care if:    · You cough up blood.     · You have new or worse trouble breathing.     · You have a new or higher fever.     · You have a new rash.    Watch closely for changes in your health, and be sure to contact your doctor if:    · You cough more deeply or more often, especially if you notice more mucus or a change in the color of your mucus.     · You have new symptoms, such as a sore throat, an earache, or sinus pain.     · You do not get better as expected. Where can you learn more? Go to https://Kee [email or web address].[email or web address] Begun. org and sign in to your CloudTalk account. Enter D279 in the AmberAds box to learn more about \"Cough: Care Instructions. \"     If you do not have an account, please click on the \"Sign Up Now\" link. Current as of: June 9, 2019  Content Version: 12.3  © 7716-7078 Healthwise, Incorporated. Care instructions adapted under license by [facility] ([facility]). If you have questions about a medical condition or this instruction, always ask your healthcare professional. [place] [address] any warranty or liability for your use of this information.

## 2020-02-13 NOTE — PROGRESS NOTES
Name: Rosa Stevens  : 1960         Chief Complaint:     Chief Complaint   Patient presents with    URI     \"for a month. \"        History of Present Illness:      Rosa Stevens is a 60 y.o.  female who presents with URI (\"for a month. \" )      Maged is here today for routine office visit. She was treated approximately 10 days ago for atypical pneumonia. She was given a Z-Christopher. She states she is feeling not much better and also has upper respiratory symptoms at this time. Cough   This is a recurrent problem. The current episode started 1 to 4 weeks ago. The problem has been gradually worsening. The problem occurs constantly. The cough is non-productive. Associated symptoms include chills, a fever, headaches, nasal congestion, postnasal drip, rhinorrhea, a sore throat, shortness of breath and wheezing. Pertinent negatives include no chest pain, ear congestion, ear pain, heartburn, hemoptysis, myalgias, rash, sweats or weight loss. The symptoms are aggravated by lying down. She has tried a beta-agonist inhaler and OTC cough suppressant for the symptoms. The treatment provided mild relief. Her past medical history is significant for bronchitis, COPD and pneumonia. There is no history of asthma, bronchiectasis, emphysema or environmental allergies. URI    This is a new problem. The current episode started in the past 7 days. The problem has been gradually worsening. Maximum temperature: Subjective. The fever has been present for 1 to 2 days. Associated symptoms include congestion, coughing, headaches, rhinorrhea, sinus pain, a sore throat and wheezing. Pertinent negatives include no abdominal pain, chest pain, diarrhea, dysuria, ear pain, joint pain, joint swelling, nausea, neck pain, plugged ear sensation, rash, sneezing, swollen glands or vomiting. She has tried decongestant for the symptoms. The treatment provided mild relief.          Past Medical History:     Past Medical History:   Diagnosis Date    Anxiety 2012    Broken bones 1985    Depression 2012    Gallbladder & bile duct stone with obstruction 2000    Hyperlipidemia 2012    Hypertension 2012    Numbness and tingling     generalized    Stroke (cerebrum) ([facility] [address] 2018      Reviewed all health maintenance requirements and ordered appropriate tests  There are no preventive care reminders to display for this patient. Past Surgical History:     Past Surgical History:   Procedure Laterality Date    ABDOMEN SURGERY      CHOLECYSTECTOMY      COLONOSCOPY      ENDOSCOPY, COLON, DIAGNOSTIC      HERNIA REPAIR  2015    HYSTERECTOMY      MD COLON CA SCRN NOT  [address] IND N/A 9/25/2018    -normal    TONSILLECTOMY AND ADENOIDECTOMY          Medications:       Prior to Admission medications    Medication Sig Start Date End Date Taking? Authorizing Provider   Respiratory Therapy Supplies (NEBULIZER/TUBING/MOUTHPIECE) KIT 1 kit by Does not apply route daily as needed (wheezing, sob) 2/13/20  Yes Al Salgado APRN - CNP   albuterol (PROVENTIL) (2.5 MG/3ML) 0.083% nebulizer solution Take 3 mLs by nebulization every 4 hours as needed for Wheezing or Shortness of Breath 2/13/20  Yes Al Salgado, APRN - CNP   amoxicillin-clavulanate (AUGMENTIN) 875-125 MG per tablet Take 1 tablet by mouth 2 times daily for 10 days 2/13/20 2/23/20 Yes Al Salgado APRN - CNP   predniSONE (DELTASONE) 20 MG tablet Take 2 tablets by mouth daily for 3 days, THEN 1 tablet daily for 3 days, THEN 0.5 tablets daily for 3 days.  2/13/20 2/22/20 Yes Al Salgado, APRN - CNP   benzonatate (TESSALON) 200 MG capsule Take 1 capsule by mouth 3 times daily as needed for Cough 2/13/20 2/20/20 Yes Al Salgado, APRN - CNP   albuterol sulfate  (90 Base) MCG/ACT inhaler Inhale 2 puffs into the lungs 4 times daily as needed for Wheezing 1/2/20  Yes [de-identified] M DeaMORALES sequeiraN - JONATHAN   atorvastatin (LIPITOR) 40 MG tablet Take 1 tablet by mouth nightly 12/9/19  Yes Al Salgado, Wheezing present. No rhonchi. Abdominal:      General: Bowel sounds are normal. There is no distension. Palpations: Abdomen is soft. Tenderness: There is no abdominal tenderness. Musculoskeletal:      Right lower leg: No edema. Left lower leg: No edema. Lymphadenopathy:      Cervical: No cervical adenopathy. Skin:     General: Skin is warm and dry. Findings: No rash. Neurological:      Mental Status: She is alert and oriented to person, place, and time. Psychiatric:         Mood and Affect: Mood normal.         Behavior: Behavior normal.         Data:     Lab Results   Component Value Date     10/16/2019    K 3.5 10/16/2019     10/16/2019    CO2 21 10/16/2019    BUN 8 10/16/2019    CREATININE 0.72 10/16/2019    GLUCOSE 105 10/16/2019    PROT 6.6 10/16/2019    LABALBU 3.6 10/16/2019    BILITOT 0.35 10/16/2019    ALKPHOS 75 10/16/2019    AST 15 10/16/2019    ALT 24 10/16/2019     Lab Results   Component Value Date    WBC 5.7 10/16/2019    RBC 3.86 10/16/2019    HGB 11.3 10/16/2019    HCT 36.2 10/16/2019    MCV 93.8 10/16/2019    MCH 29.3 10/16/2019    MCHC 31.2 10/16/2019    RDW 13.7 10/16/2019     10/16/2019    MPV 9.7 10/16/2019     Lab Results   Component Value Date    TSH 3.33 09/06/2018     Lab Results   Component Value Date    CHOL 179 05/30/2019    HDL 63 05/30/2019    LABA1C 5.3 05/30/2019       Assessment/Plan:      Diagnosis Orders   1. COPD with acute exacerbation (HCC)  Respiratory Therapy Supplies (NEBULIZER/TUBING/MOUTHPIECE) KIT    albuterol (PROVENTIL) (2.5 MG/3ML) 0.083% nebulizer solution    amoxicillin-clavulanate (AUGMENTIN) 875-125 MG per tablet    predniSONE (DELTASONE) 20 MG tablet    benzonatate (TESSALON) 200 MG capsule   2. Acute non-recurrent pansinusitis       We will treat with Augmentin, prednisone, and Tessalon. She is to rest and increase her fluids. I also provided her with a home nebulizer and aerosols.   She is to return to the

## 2020-03-11 ENCOUNTER — OFFICE VISIT (OUTPATIENT)
Dept: PRIMARY CARE CLINIC | Age: 60
End: 2020-03-11
Payer: MEDICARE

## 2020-03-11 VITALS
TEMPERATURE: 97.1 F | RESPIRATION RATE: 18 BRPM | HEART RATE: 71 BPM | WEIGHT: 229.1 LBS | BODY MASS INDEX: 40.59 KG/M2 | SYSTOLIC BLOOD PRESSURE: 117 MMHG | OXYGEN SATURATION: 98 % | DIASTOLIC BLOOD PRESSURE: 72 MMHG | HEIGHT: 63 IN

## 2020-03-11 PROCEDURE — 1036F TOBACCO NON-USER: CPT | Performed by: NURSE PRACTITIONER

## 2020-03-11 PROCEDURE — G8926 SPIRO NO PERF OR DOC: HCPCS | Performed by: NURSE PRACTITIONER

## 2020-03-11 PROCEDURE — 94640 AIRWAY INHALATION TREATMENT: CPT | Performed by: NURSE PRACTITIONER

## 2020-03-11 PROCEDURE — 99213 OFFICE O/P EST LOW 20 MIN: CPT | Performed by: NURSE PRACTITIONER

## 2020-03-11 PROCEDURE — 3017F COLORECTAL CA SCREEN DOC REV: CPT | Performed by: NURSE PRACTITIONER

## 2020-03-11 PROCEDURE — G8482 FLU IMMUNIZE ORDER/ADMIN: HCPCS | Performed by: NURSE PRACTITIONER

## 2020-03-11 PROCEDURE — G8427 DOCREV CUR MEDS BY ELIG CLIN: HCPCS | Performed by: NURSE PRACTITIONER

## 2020-03-11 PROCEDURE — G8417 CALC BMI ABV UP PARAM F/U: HCPCS | Performed by: NURSE PRACTITIONER

## 2020-03-11 PROCEDURE — 3023F SPIROM DOC REV: CPT | Performed by: NURSE PRACTITIONER

## 2020-03-11 RX ORDER — AZITHROMYCIN 250 MG/1
250 TABLET, FILM COATED ORAL SEE ADMIN INSTRUCTIONS
Qty: 6 TABLET | Refills: 0 | Status: SHIPPED | OUTPATIENT
Start: 2020-03-11 | End: 2020-03-16

## 2020-03-11 RX ORDER — BENZONATATE 200 MG/1
200 CAPSULE ORAL 3 TIMES DAILY PRN
Qty: 20 CAPSULE | Refills: 0 | Status: SHIPPED | OUTPATIENT
Start: 2020-03-11 | End: 2020-03-18

## 2020-03-11 RX ORDER — PREDNISONE 20 MG/1
40 TABLET ORAL DAILY
Qty: 10 TABLET | Refills: 0 | Status: SHIPPED | OUTPATIENT
Start: 2020-03-11 | End: 2020-03-16

## 2020-03-11 RX ORDER — ALBUTEROL SULFATE 2.5 MG/3ML
2.5 SOLUTION RESPIRATORY (INHALATION) ONCE
Status: COMPLETED | OUTPATIENT
Start: 2020-03-11 | End: 2020-03-11

## 2020-03-11 RX ADMIN — ALBUTEROL SULFATE 2.5 MG: 2.5 SOLUTION RESPIRATORY (INHALATION) at 12:00

## 2020-03-11 ASSESSMENT — ENCOUNTER SYMPTOMS
SHORTNESS OF BREATH: 1
COUGH: 1
HEARTBURN: 0
HEMOPTYSIS: 0
SORE THROAT: 0
WHEEZING: 1
RHINORRHEA: 0

## 2020-03-11 NOTE — PATIENT INSTRUCTIONS
SURVEY:    You may be receiving a survey from [employer] regarding your visit today. Please complete the survey to enable us to provide the highest quality of care to you and your family. If you cannot score us a very good on any question, please call the office to discuss how we could have made your experience a very good one. Thank you. Patient Education        Cough: Care Instructions  Your Care Instructions    A cough is your body's response to something that bothers your throat or airways. Many things can cause a cough. You might cough because of a cold or the flu, bronchitis, or asthma. Smoking, postnasal drip, allergies, and stomach acid that backs up into your throat also can cause coughs. A cough is a symptom, not a disease. Most coughs stop when the cause, such as a cold, goes away. You can take a few steps at home to cough less and feel better. Follow-up care is a key part of your treatment and safety. Be sure to make and go to all appointments, and call your doctor if you are having problems. It's also a good idea to know your test results and keep a list of the medicines you take. How can you care for yourself at home? · Drink lots of water and other fluids. This helps thin the mucus and soothes a dry or sore throat. Honey or lemon juice in hot water or tea may ease a dry cough. · Take cough medicine as directed by your doctor. · Prop up your head on pillows to help you breathe and ease a dry cough. · Try cough drops to soothe a dry or sore throat. Cough drops don't stop a cough. Medicine-flavored cough drops are no better than candy-flavored drops or hard candy. · Do not smoke. Avoid secondhand smoke. If you need help quitting, talk to your doctor about stop-smoking programs and medicines. These can increase your chances of quitting for good. When should you call for help? Call 911 anytime you think you may need emergency care.  For example, call if:    · You have severe trouble breathing.    Call your doctor now or seek immediate medical care if:    · You cough up blood.     · You have new or worse trouble breathing.     · You have a new or higher fever.     · You have a new rash.    Watch closely for changes in your health, and be sure to contact your doctor if:    · You cough more deeply or more often, especially if you notice more mucus or a change in the color of your mucus.     · You have new symptoms, such as a sore throat, an earache, or sinus pain.     · You do not get better as expected. Where can you learn more? Go to https://Omnitrol NetworkspeKEMP Technologieseb.Logicalware. org and sign in to your "Mosec, Mobile Secretary" account. Enter D279 in the Trooval box to learn more about \"Cough: Care Instructions. \"     If you do not have an account, please click on the \"Sign Up Now\" link. Current as of: June 9, 2019  Content Version: 12.3  © 1463-9879 Healthwise, Incorporated. Care instructions adapted under license by [facility] ([facility]). If you have questions about a medical condition or this instruction, always ask your healthcare professional. [place] [address] any warranty or liability for your use of this information.

## 2020-03-11 NOTE — PROGRESS NOTES
250 MG tablet Take 1 tablet by mouth See Admin Instructions for 5 days 500mg on day 1 followed by 250mg on days 2 - 5 3/11/20 3/16/20 Yes OLIVER Cortes CNP   predniSONE (DELTASONE) 20 MG tablet Take 2 tablets by mouth daily for 5 days 3/11/20 3/16/20 Yes OLIVER Cortes CNP   benzonatate (TESSALON) 200 MG capsule Take 1 capsule by mouth 3 times daily as needed for Cough 3/11/20 3/18/20 Yes OLIVER Cortes CNP   Respiratory Therapy Supplies (NEBULIZER/TUBING/MOUTHPIECE) KIT 1 kit by Does not apply route daily as needed (wheezing, sob) 2/13/20  Yes OLIVER Cortes CNP   albuterol (PROVENTIL) (2.5 MG/3ML) 0.083% nebulizer solution Take 3 mLs by nebulization every 4 hours as needed for Wheezing or Shortness of Breath 2/13/20  Yes OLIVER Cortes CNP   albuterol sulfate  (90 Base) MCG/ACT inhaler Inhale 2 puffs into the lungs 4 times daily as needed for Wheezing 1/2/20  Yes OLIVER Malik CNP   atorvastatin (LIPITOR) 40 MG tablet Take 1 tablet by mouth nightly 12/9/19  Yes OLIVER Cortes CNP   buPROPion (WELLBUTRIN XL) 300 MG extended release tablet Take 1 tablet by mouth daily 12/9/19  Yes OLIVER Cortes CNP   diltiazem (TIAZAC) 180 MG extended release capsule TAKE ONE CAPSULE BY MOUTH DAILY 12/9/19  Yes OLIVER Cortes CNP   omeprazole (PRILOSEC) 20 MG delayed release capsule TAKE ONE CAPSULE BY MOUTH DAILY 12/9/19  Yes OLIVER Cortes CNP   atenolol (TENORMIN) 25 MG tablet TAKE ONE TABLET BY MOUTH DAILY 12/9/19  Yes OLIVER Cortes CNP   sertraline (ZOLOFT) 50 MG tablet Take 1 tablet by mouth daily 10/28/19  Yes OLIVER Cortes CNP   buPROPion (WELLBUTRIN XL) 150 MG extended release tablet Take 1 tablet by mouth every morning 10/28/19  Yes Al Travis Might, APRN - CNP   metFORMIN (GLUCOPHAGE) 500 MG tablet TAKE ONE TABLET BY MOUTH TWICE A DAY WITH MEALS 7/1/19  Yes Al Salgado, APRN - CNP   aspirin 81 MG chewable tablet Take 81 mg by

## 2020-03-20 RX ORDER — ALBUTEROL SULFATE 90 UG/1
AEROSOL, METERED RESPIRATORY (INHALATION)
Qty: 18 G | Refills: 0 | Status: SHIPPED | OUTPATIENT
Start: 2020-03-20 | End: 2021-03-12

## 2020-03-20 NOTE — TELEPHONE ENCOUNTER
depressive disorder ([address]     Gastroesophageal reflux disease     Neurological symptoms     Acute diverticulitis     Anxiety     Depression     Hypertension     Hyperlipidemia

## 2020-03-26 ENCOUNTER — OFFICE VISIT (OUTPATIENT)
Dept: PRIMARY CARE CLINIC | Age: 60
End: 2020-03-26
Payer: MEDICARE

## 2020-03-26 VITALS
SYSTOLIC BLOOD PRESSURE: 130 MMHG | OXYGEN SATURATION: 98 % | BODY MASS INDEX: 41.11 KG/M2 | TEMPERATURE: 96.9 F | HEIGHT: 63 IN | HEART RATE: 80 BPM | DIASTOLIC BLOOD PRESSURE: 86 MMHG | RESPIRATION RATE: 16 BRPM | WEIGHT: 232 LBS

## 2020-03-26 PROCEDURE — 1036F TOBACCO NON-USER: CPT | Performed by: NURSE PRACTITIONER

## 2020-03-26 PROCEDURE — G8417 CALC BMI ABV UP PARAM F/U: HCPCS | Performed by: NURSE PRACTITIONER

## 2020-03-26 PROCEDURE — G8482 FLU IMMUNIZE ORDER/ADMIN: HCPCS | Performed by: NURSE PRACTITIONER

## 2020-03-26 PROCEDURE — G8427 DOCREV CUR MEDS BY ELIG CLIN: HCPCS | Performed by: NURSE PRACTITIONER

## 2020-03-26 PROCEDURE — 99213 OFFICE O/P EST LOW 20 MIN: CPT | Performed by: NURSE PRACTITIONER

## 2020-03-26 PROCEDURE — 3017F COLORECTAL CA SCREEN DOC REV: CPT | Performed by: NURSE PRACTITIONER

## 2020-03-26 RX ORDER — TIOTROPIUM BROMIDE 18 UG/1
18 CAPSULE ORAL; RESPIRATORY (INHALATION) DAILY
Qty: 30 CAPSULE | Refills: 1 | Status: SHIPPED | OUTPATIENT
Start: 2020-03-26 | End: 2020-05-26 | Stop reason: SDUPTHER

## 2020-03-26 RX ORDER — PREDNISONE 20 MG/1
TABLET ORAL
Qty: 11 TABLET | Refills: 0 | Status: SHIPPED | OUTPATIENT
Start: 2020-03-26 | End: 2020-04-04

## 2020-03-26 RX ORDER — BENZONATATE 200 MG/1
200 CAPSULE ORAL 3 TIMES DAILY PRN
Qty: 30 CAPSULE | Refills: 0 | Status: SHIPPED | OUTPATIENT
Start: 2020-03-26 | End: 2020-04-02

## 2020-03-26 ASSESSMENT — ENCOUNTER SYMPTOMS
WHEEZING: 1
TROUBLE SWALLOWING: 0
HEMOPTYSIS: 0
VOMITING: 0
EYE ITCHING: 0
CHEST TIGHTNESS: 1
SHORTNESS OF BREATH: 1
SORE THROAT: 0
EYE DISCHARGE: 0
HEARTBURN: 0
COUGH: 1
NAUSEA: 0
DIARRHEA: 0
EYE PAIN: 0
RHINORRHEA: 0

## 2020-03-26 NOTE — PATIENT INSTRUCTIONS
SURVEY:    You may be receiving a survey from [employer] regarding your visit today. Please complete the survey to enable us to provide the highest quality of care to you and your family. If you cannot score us a very good on any question, please call the office to discuss how we could of made your experience a very good one. Thank you. Patient Education        Cough: Care Instructions  Your Care Instructions    A cough is your body's response to something that bothers your throat or airways. Many things can cause a cough. You might cough because of a cold or the flu, bronchitis, or asthma. Smoking, postnasal drip, allergies, and stomach acid that backs up into your throat also can cause coughs. A cough is a symptom, not a disease. Most coughs stop when the cause, such as a cold, goes away. You can take a few steps at home to cough less and feel better. Follow-up care is a key part of your treatment and safety. Be sure to make and go to all appointments, and call your doctor if you are having problems. It's also a good idea to know your test results and keep a list of the medicines you take. How can you care for yourself at home? · Drink lots of water and other fluids. This helps thin the mucus and soothes a dry or sore throat. Honey or lemon juice in hot water or tea may ease a dry cough. · Take cough medicine as directed by your doctor. · Prop up your head on pillows to help you breathe and ease a dry cough. · Try cough drops to soothe a dry or sore throat. Cough drops don't stop a cough. Medicine-flavored cough drops are no better than candy-flavored drops or hard candy. · Do not smoke. Avoid secondhand smoke. If you need help quitting, talk to your doctor about stop-smoking programs and medicines. These can increase your chances of quitting for good. When should you call for help? Call 911 anytime you think you may need emergency care.  For example, call if:    · You have severe trouble

## 2020-03-26 NOTE — PROGRESS NOTES
Korin Del Valle  1960    Chief Complaint   Patient presents with    Cough     coughing and wheezing d/t her COPD x 2 weeks was last seen on 3/11 for this       Respiratory Symptoms:  Patient complains of 2 week(s) history of wheezing/chest tightness. Symptoms have been worsening with time. She denies cough. Relevant PMH: COPD. Smoking history:  She  reports that she quit smoking about 5 months ago. She has a 10.00 pack-year smoking history. She has never used smokeless tobacco.     She has had no known ill contacts. Treatment to date: nebulized albuterol. Travel screen completed:  Yes        Cele Greene is here today for a walk in visit for cough, wheezing and shortness of breath. This is been somewhat of a ongoing issue and this is the fourth time she has been seen since January for similar complaint. Most recently she was seen on March 11th. She was treated for a COPD exacerbation with steroids and azithromycin. She reports that her symptoms are improving but last Friday she started to have wheezing and shortness of breath again. She denies any fever, chills, malaise and otherwise feels well. She reports sometimes she is coughing so hard it feels like she might pass out. Cough   This is a recurrent problem. The current episode started in the past 7 days. The problem has been gradually worsening. The cough is non-productive. Associated symptoms include headaches (from coughing), shortness of breath and wheezing. Pertinent negatives include no chest pain, chills, fever, heartburn, hemoptysis, nasal congestion, postnasal drip, rhinorrhea, sore throat or sweats. She has tried nothing (tessalon and prednisone and zpack) for the symptoms. There is no history of asthma. Review of Systems   Constitutional: Negative for activity change, chills, fatigue and fever. HENT: Negative for congestion, postnasal drip, rhinorrhea, sore throat and trouble swallowing.     Eyes: Negative for pain, discharge and itching. Respiratory: Positive for cough, chest tightness, shortness of breath and wheezing. Negative for hemoptysis. Cardiovascular: Negative for chest pain and palpitations. Gastrointestinal: Negative for diarrhea, heartburn, nausea and vomiting. Neurological: Positive for headaches (from coughing). Negative for dizziness. Vitals:    03/26/20 1001   BP: 130/86   Site: Left Upper Arm   Position: Sitting   Cuff Size: Large Adult   Pulse: 80   Resp: 16   Temp: 96.9 °F (36.1 °C)   TempSrc: Temporal   SpO2: 98%   Weight: 232 lb (105.2 kg)   Height: 5' 3\" (1.6 m)      Physical Exam  Vitals signs and nursing note reviewed. Constitutional:       General: She is not in acute distress. Appearance: Normal appearance. She is not ill-appearing, toxic-appearing or diaphoretic. HENT:      Head: Normocephalic and atraumatic. Right Ear: There is no impacted cerumen. Left Ear: There is no impacted cerumen. Nose: No congestion or rhinorrhea. Eyes:      General:         Right eye: No discharge. Left eye: No discharge. Conjunctiva/sclera: Conjunctivae normal.   Neck:      Musculoskeletal: Normal range of motion and neck supple. Cardiovascular:      Rate and Rhythm: Normal rate and regular rhythm. Heart sounds: No murmur. Pulmonary:      Effort: Pulmonary effort is normal.      Breath sounds: Wheezing (expiratory wheezing) present. No rhonchi or rales. Lymphadenopathy:      Cervical: No cervical adenopathy. Neurological:      General: No focal deficit present. Mental Status: She is alert and oriented to person, place, and time. Psychiatric:         Mood and Affect: Mood normal.         Behavior: Behavior normal.          Assessment/Plan:  1. Cough    - Full PFT Study With Bronchodilator; Future  - benzonatate (TESSALON) 200 MG capsule; Take 1 capsule by mouth 3 times daily as needed for Cough  Dispense: 30 capsule;  Refill: 0  - tiotropium (SPIRIVA HANDIHALER) 18 MCG inhalation capsule; Inhale 1 capsule into the lungs daily  Dispense: 30 capsule; Refill: 1  - predniSONE (DELTASONE) 20 MG tablet; Take 2 tablets by mouth daily for 3 days, THEN 1 tablet daily for 3 days, THEN 0.5 tablets daily for 3 days. Dispense: 11 tablet; Refill: 0    Presents with recurrent cough, wheezing and shortness of breath. She does have a history of tobacco abuse however she has quit roughly 5 months ago. It is presumed that she has COPD however she has not had pulmonary function studies done. This is the fourth time she has been seen since January with similar complaints. Other than her shortness of breath, wheezing and cough she feels well otherwise. She has not had any fever or chills. She does have expiratory wheezing throughout no rales or rhonchi. Will treat her with a steroid taper and also start her on Spiriva. Will obtain pulmonary function testing. Reviewed exam findings and plan of care as well as supportive management as listed below with patient at bedside. · Supportive management encouraged including Tylenol over-the-counter as instructed on packaging for fever and discomfort. Cool mist humidifiers. Hot tea with honey and lemon for cough as needed. · Instructions pertinent to diagnoses and treatment plan given with AVS.  · Encouraged to follow up to ER for any difficulty breathing, shortness of breath or inability to swallow including hives and fevers above 103°. · Follow-up at this office or PCP if no improvement. OLIVER Prescott CNP  3/26/20     This visit was provided as a focused evaluation during the COVID -19 pandemic/national emergency. A comprehensive review of all previous patient history and testing was not conducted. Pertinent findings were elicited during the visit.

## 2020-05-26 RX ORDER — TIOTROPIUM BROMIDE 18 UG/1
18 CAPSULE ORAL; RESPIRATORY (INHALATION) DAILY
Qty: 90 CAPSULE | Refills: 3 | Status: SHIPPED | OUTPATIENT
Start: 2020-05-26 | End: 2021-05-28

## 2020-05-28 DIAGNOSIS — Z01.818 PRE-OP TESTING: Primary | ICD-10-CM

## 2020-05-28 RX ORDER — ATENOLOL 25 MG/1
TABLET ORAL
Qty: 90 TABLET | Refills: 3 | Status: SHIPPED | OUTPATIENT
Start: 2020-05-28 | End: 2021-06-01

## 2020-05-29 ENCOUNTER — HOSPITAL ENCOUNTER (OUTPATIENT)
Dept: PREADMISSION TESTING | Age: 60
Setting detail: SPECIMEN
Discharge: HOME | End: 2020-06-02
Payer: MEDICARE

## 2020-05-29 PROCEDURE — U0003 INFECTIOUS AGENT DETECTION BY NUCLEIC ACID (DNA OR RNA); SEVERE ACUTE RESPIRATORY SYNDROME CORONAVIRUS 2 (SARS-COV-2) (CORONAVIRUS DISEASE [COVID-19]), AMPLIFIED PROBE TECHNIQUE, MAKING USE OF HIGH THROUGHPUT TECHNOLOGIES AS DESCRIBED BY CMS-2020-01-R: HCPCS

## 2020-05-31 LAB — SARS-COV-2, NAA: NOT DETECTED

## 2020-06-01 ENCOUNTER — TELEPHONE (OUTPATIENT)
Dept: PRIMARY CARE CLINIC | Age: 60
End: 2020-06-01

## 2020-06-03 ENCOUNTER — HOSPITAL ENCOUNTER (OUTPATIENT)
Dept: PULMONOLOGY | Age: 60
Discharge: HOME | End: 2020-06-03
Payer: MEDICARE

## 2020-06-03 ENCOUNTER — HOSPITAL ENCOUNTER (OUTPATIENT)
Age: 60
Discharge: HOME | End: 2020-06-03
Payer: MEDICARE

## 2020-06-03 LAB
ABSOLUTE EOS #: 0.21 K/UL (ref 0–0.44)
ABSOLUTE IMMATURE GRANULOCYTE: 0.05 K/UL (ref 0–0.3)
ABSOLUTE LYMPH #: 1.23 K/UL (ref 1.1–3.7)
ABSOLUTE MONO #: 0.5 K/UL (ref 0.1–1.2)
ALT SERPL-CCNC: 56 U/L (ref 5–33)
ANION GAP SERPL CALCULATED.3IONS-SCNC: 11 MMOL/L (ref 9–17)
AST SERPL-CCNC: 48 U/L
BASOPHILS # BLD: 1 % (ref 0–2)
BASOPHILS ABSOLUTE: 0.05 K/UL (ref 0–0.2)
BUN BLDV-MCNC: 16 MG/DL (ref 8–23)
BUN/CREAT BLD: 17 (ref 9–20)
CALCIUM SERPL-MCNC: 9.4 MG/DL (ref 8.6–10.4)
CHLORIDE BLD-SCNC: 104 MMOL/L (ref 98–107)
CHOLESTEROL/HDL RATIO: 2.9
CHOLESTEROL: 166 MG/DL
CO2: 24 MMOL/L (ref 20–31)
CREAT SERPL-MCNC: 0.95 MG/DL (ref 0.5–0.9)
CREATININE URINE: 99.5 MG/DL (ref 28–217)
DIFFERENTIAL TYPE: ABNORMAL
EOSINOPHILS RELATIVE PERCENT: 4 % (ref 1–4)
GFR AFRICAN AMERICAN: >60 ML/MIN
GFR NON-AFRICAN AMERICAN: >60 ML/MIN
GFR SERPL CREATININE-BSD FRML MDRD: ABNORMAL ML/MIN/{1.73_M2}
GFR SERPL CREATININE-BSD FRML MDRD: ABNORMAL ML/MIN/{1.73_M2}
GLUCOSE BLD-MCNC: 119 MG/DL (ref 70–99)
HCT VFR BLD CALC: 44.6 % (ref 36.3–47.1)
HDLC SERPL-MCNC: 57 MG/DL
HEMOGLOBIN: 13.7 G/DL (ref 11.9–15.1)
IMMATURE GRANULOCYTES: 1 %
LDL CHOLESTEROL: 68 MG/DL (ref 0–130)
LYMPHOCYTES # BLD: 22 % (ref 24–43)
MCH RBC QN AUTO: 29 PG (ref 25.2–33.5)
MCHC RBC AUTO-ENTMCNC: 30.7 G/DL (ref 28.4–34.8)
MCV RBC AUTO: 94.3 FL (ref 82.6–102.9)
MICROALBUMIN/CREAT 24H UR: <12 MG/L
MICROALBUMIN/CREAT UR-RTO: NORMAL MCG/MG CREAT
MONOCYTES # BLD: 9 % (ref 3–12)
NRBC AUTOMATED: 0 PER 100 WBC
PDW BLD-RTO: 14.3 % (ref 11.8–14.4)
PLATELET # BLD: 302 K/UL (ref 138–453)
PLATELET ESTIMATE: ABNORMAL
PMV BLD AUTO: 9.9 FL (ref 8.1–13.5)
POTASSIUM SERPL-SCNC: 4.1 MMOL/L (ref 3.7–5.3)
RBC # BLD: 4.73 M/UL (ref 3.95–5.11)
RBC # BLD: ABNORMAL 10*6/UL
SEG NEUTROPHILS: 63 % (ref 36–65)
SEGMENTED NEUTROPHILS ABSOLUTE COUNT: 3.63 K/UL (ref 1.5–8.1)
SODIUM BLD-SCNC: 139 MMOL/L (ref 135–144)
TRIGL SERPL-MCNC: 205 MG/DL
VLDLC SERPL CALC-MCNC: ABNORMAL MG/DL (ref 1–30)
WBC # BLD: 5.7 K/UL (ref 3.5–11.3)
WBC # BLD: ABNORMAL 10*3/UL

## 2020-06-03 PROCEDURE — 94726 PLETHYSMOGRAPHY LUNG VOLUMES: CPT

## 2020-06-03 PROCEDURE — 6370000000 HC RX 637 (ALT 250 FOR IP): Performed by: INTERNAL MEDICINE

## 2020-06-03 PROCEDURE — 94729 DIFFUSING CAPACITY: CPT

## 2020-06-03 PROCEDURE — 84460 ALANINE AMINO (ALT) (SGPT): CPT

## 2020-06-03 PROCEDURE — 94664 DEMO&/EVAL PT USE INHALER: CPT

## 2020-06-03 PROCEDURE — 36415 COLL VENOUS BLD VENIPUNCTURE: CPT

## 2020-06-03 PROCEDURE — 85025 COMPLETE CBC W/AUTO DIFF WBC: CPT

## 2020-06-03 PROCEDURE — 84450 TRANSFERASE (AST) (SGOT): CPT

## 2020-06-03 PROCEDURE — 80048 BASIC METABOLIC PNL TOTAL CA: CPT

## 2020-06-03 PROCEDURE — 94060 EVALUATION OF WHEEZING: CPT

## 2020-06-03 PROCEDURE — 80061 LIPID PANEL: CPT

## 2020-06-03 PROCEDURE — 82570 ASSAY OF URINE CREATININE: CPT

## 2020-06-03 PROCEDURE — 82043 UR ALBUMIN QUANTITATIVE: CPT

## 2020-06-03 RX ORDER — ALBUTEROL SULFATE 90 UG/1
2 AEROSOL, METERED RESPIRATORY (INHALATION) ONCE
Status: COMPLETED | OUTPATIENT
Start: 2020-06-03 | End: 2020-06-03

## 2020-06-03 RX ADMIN — ALBUTEROL SULFATE 2 PUFF: 90 AEROSOL, METERED RESPIRATORY (INHALATION) at 11:09

## 2020-06-09 ENCOUNTER — OFFICE VISIT (OUTPATIENT)
Dept: PRIMARY CARE CLINIC | Age: 60
End: 2020-06-09
Payer: MEDICARE

## 2020-06-09 VITALS
HEART RATE: 68 BPM | RESPIRATION RATE: 22 BRPM | BODY MASS INDEX: 41.88 KG/M2 | WEIGHT: 236.4 LBS | OXYGEN SATURATION: 97 % | DIASTOLIC BLOOD PRESSURE: 88 MMHG | TEMPERATURE: 97.5 F | SYSTOLIC BLOOD PRESSURE: 138 MMHG

## 2020-06-09 PROBLEM — E66.01 MORBIDLY OBESE (HCC): Status: ACTIVE | Noted: 2020-06-09

## 2020-06-09 PROCEDURE — 3017F COLORECTAL CA SCREEN DOC REV: CPT | Performed by: NURSE PRACTITIONER

## 2020-06-09 PROCEDURE — 3023F SPIROM DOC REV: CPT | Performed by: NURSE PRACTITIONER

## 2020-06-09 PROCEDURE — G8427 DOCREV CUR MEDS BY ELIG CLIN: HCPCS | Performed by: NURSE PRACTITIONER

## 2020-06-09 PROCEDURE — G8926 SPIRO NO PERF OR DOC: HCPCS | Performed by: NURSE PRACTITIONER

## 2020-06-09 PROCEDURE — 1036F TOBACCO NON-USER: CPT | Performed by: NURSE PRACTITIONER

## 2020-06-09 PROCEDURE — 99214 OFFICE O/P EST MOD 30 MIN: CPT | Performed by: NURSE PRACTITIONER

## 2020-06-09 PROCEDURE — G8417 CALC BMI ABV UP PARAM F/U: HCPCS | Performed by: NURSE PRACTITIONER

## 2020-06-09 ASSESSMENT — PATIENT HEALTH QUESTIONNAIRE - PHQ9
1. LITTLE INTEREST OR PLEASURE IN DOING THINGS: 0
SUM OF ALL RESPONSES TO PHQ9 QUESTIONS 1 & 2: 0
2. FEELING DOWN, DEPRESSED OR HOPELESS: 0
SUM OF ALL RESPONSES TO PHQ QUESTIONS 1-9: 0
SUM OF ALL RESPONSES TO PHQ QUESTIONS 1-9: 0

## 2020-06-10 ENCOUNTER — TELEPHONE (OUTPATIENT)
Dept: PRIMARY CARE CLINIC | Age: 60
End: 2020-06-10

## 2020-06-10 PROBLEM — J44.9 CHRONIC OBSTRUCTIVE PULMONARY DISEASE (HCC): Status: ACTIVE | Noted: 2020-06-10

## 2020-06-10 PROBLEM — Z87.891 FORMER SMOKER: Status: ACTIVE | Noted: 2020-06-10

## 2020-06-10 ASSESSMENT — ENCOUNTER SYMPTOMS
HEARTBURN: 0
VOMITING: 0
RHINORRHEA: 0
FREQUENT THROAT CLEARING: 0
NAUSEA: 0
HEMOPTYSIS: 0
CONSTIPATION: 0
WHEEZING: 0
TROUBLE SWALLOWING: 1
COUGH: 1
SORE THROAT: 0
CHOKING: 1
HOARSE VOICE: 0
DIFFICULTY BREATHING: 0
CHEST TIGHTNESS: 0
SHORTNESS OF BREATH: 0
DIARRHEA: 0
SPUTUM PRODUCTION: 0
ABDOMINAL PAIN: 0

## 2020-06-10 ASSESSMENT — COPD QUESTIONNAIRES: COPD: 1

## 2020-06-10 NOTE — TELEPHONE ENCOUNTER
Called patient and informed her that [facility] was referring her to Dr Belkys Shaver, pulmonologist and that office is located at the hospital and they will be calling her to schedule an a appointment. Verbalizes understanding. Order faxed to outpatient.

## 2020-06-11 ENCOUNTER — HOSPITAL ENCOUNTER (OUTPATIENT)
Dept: ULTRASOUND IMAGING | Age: 60
Discharge: HOME | End: 2020-06-13
Payer: MEDICARE

## 2020-06-11 ENCOUNTER — TELEPHONE (OUTPATIENT)
Dept: PRIMARY CARE CLINIC | Age: 60
End: 2020-06-11

## 2020-06-11 PROCEDURE — 76536 US EXAM OF HEAD AND NECK: CPT

## 2020-06-12 NOTE — PROCEDURES
[address], [place] [address]                               PULMONARY FUNCTION    PATIENT NAME: Joline Simmonds                    :        1960  MED REC NO:   [id number]                              ROOM:  ACCOUNT NO:   [de-identified]                           ADMIT DATE: 2020  PROVIDER:     Kian Sadler    DATE OF PROCEDURE:  2020    The patient's spirometry shows a normal flow volume loop. FEV1 92% of predicted with 5% bronchodilator change. FVC 92% of  predicted with 1% bronchodilator change. FEV1/FVC ratio is 77. LUNG VOLUME BY BODY BOX:  Shows RV of 145% of predicted. % of  predicted and diffusion capacity 110% of predicted, uncorrected. FINAL IMPRESSION:  The study shows a normal PFT. Clinical correlation  advised. Pauline Smith    D: 2020 13:15:57       T: 2020 21:08:32     [email or web address]  Job#: [id number]     Doc#: [id number]    CC:  [facility] Naomi Carlos, APRN-CNP

## 2020-06-15 ENCOUNTER — TELEPHONE (OUTPATIENT)
Dept: ONCOLOGY | Age: 60
End: 2020-06-15

## 2020-06-15 ENCOUNTER — TELEPHONE (OUTPATIENT)
Dept: PRIMARY CARE CLINIC | Age: 60
End: 2020-06-15

## 2020-06-16 ENCOUNTER — OFFICE VISIT (OUTPATIENT)
Dept: SURGERY | Age: 60
End: 2020-06-16
Payer: MEDICARE

## 2020-06-16 VITALS
BODY MASS INDEX: 42.19 KG/M2 | TEMPERATURE: 97.2 F | DIASTOLIC BLOOD PRESSURE: 80 MMHG | HEART RATE: 76 BPM | HEIGHT: 63 IN | RESPIRATION RATE: 24 BRPM | SYSTOLIC BLOOD PRESSURE: 130 MMHG | WEIGHT: 238.1 LBS

## 2020-06-16 PROCEDURE — 99213 OFFICE O/P EST LOW 20 MIN: CPT | Performed by: SURGERY

## 2020-06-16 PROCEDURE — 1036F TOBACCO NON-USER: CPT | Performed by: SURGERY

## 2020-06-16 PROCEDURE — G8417 CALC BMI ABV UP PARAM F/U: HCPCS | Performed by: SURGERY

## 2020-06-16 PROCEDURE — G8427 DOCREV CUR MEDS BY ELIG CLIN: HCPCS | Performed by: SURGERY

## 2020-06-16 PROCEDURE — 3017F COLORECTAL CA SCREEN DOC REV: CPT | Performed by: SURGERY

## 2020-06-16 RX ORDER — SUCRALFATE 1 G/1
1 TABLET ORAL 4 TIMES DAILY
Qty: 120 TABLET | Refills: 3 | Status: SHIPPED | OUTPATIENT
Start: 2020-06-16 | End: 2020-12-02

## 2020-06-16 RX ORDER — SUCRALFATE ORAL 1 G/10ML
1 SUSPENSION ORAL 4 TIMES DAILY
Qty: 1200 ML | Refills: 3 | Status: SHIPPED
Start: 2020-06-16 | End: 2020-06-16

## 2020-06-18 ENCOUNTER — HOSPITAL ENCOUNTER (OUTPATIENT)
Age: 60
Discharge: HOME | End: 2020-06-18
Payer: MEDICARE

## 2020-06-18 LAB — SARS-COV-2 ANTIBODY, TOTAL: NEGATIVE

## 2020-06-18 PROCEDURE — 36415 COLL VENOUS BLD VENIPUNCTURE: CPT

## 2020-06-18 PROCEDURE — 86769 SARS-COV-2 COVID-19 ANTIBODY: CPT

## 2020-06-18 RX ORDER — BUPROPION HYDROCHLORIDE 300 MG/1
300 TABLET ORAL DAILY
Qty: 90 TABLET | Refills: 1 | Status: ON HOLD
Start: 2020-06-18 | End: 2020-07-09 | Stop reason: DRUGHIGH

## 2020-06-18 RX ORDER — DILTIAZEM HYDROCHLORIDE 180 MG/1
CAPSULE, EXTENDED RELEASE ORAL
Qty: 90 CAPSULE | Refills: 1 | Status: SHIPPED | OUTPATIENT
Start: 2020-06-18 | End: 2020-12-01 | Stop reason: SDUPTHER

## 2020-06-18 NOTE — TELEPHONE ENCOUNTER
Patient Active Problem List:     Essential hypertension     Moderate episode of recurrent major depressive disorder (HCC)     Gastroesophageal reflux disease     Neurological symptoms     Acute diverticulitis     Anxiety     Depression     Hypertension     Hyperlipidemia     Morbidly obese (HCC)     Former smoker     Chronic obstructive pulmonary disease ([facility] [address]

## 2020-06-25 ENCOUNTER — HOSPITAL ENCOUNTER (OUTPATIENT)
Dept: CT IMAGING | Age: 60
Discharge: HOME | End: 2020-06-27
Payer: MEDICARE

## 2020-06-25 PROCEDURE — G0297 LDCT FOR LUNG CA SCREEN: HCPCS

## 2020-06-26 ENCOUNTER — TELEPHONE (OUTPATIENT)
Dept: PRIMARY CARE CLINIC | Age: 60
End: 2020-06-26

## 2020-07-02 ENCOUNTER — OFFICE VISIT (OUTPATIENT)
Dept: PULMONOLOGY | Age: 60
End: 2020-07-02
Payer: MEDICARE

## 2020-07-02 VITALS
DIASTOLIC BLOOD PRESSURE: 96 MMHG | HEIGHT: 62 IN | HEART RATE: 105 BPM | SYSTOLIC BLOOD PRESSURE: 160 MMHG | RESPIRATION RATE: 20 BRPM | TEMPERATURE: 97.2 F | BODY MASS INDEX: 43.24 KG/M2 | OXYGEN SATURATION: 97 % | WEIGHT: 235 LBS

## 2020-07-02 PROCEDURE — G8427 DOCREV CUR MEDS BY ELIG CLIN: HCPCS | Performed by: INTERNAL MEDICINE

## 2020-07-02 PROCEDURE — G8417 CALC BMI ABV UP PARAM F/U: HCPCS | Performed by: INTERNAL MEDICINE

## 2020-07-02 PROCEDURE — 99204 OFFICE O/P NEW MOD 45 MIN: CPT | Performed by: INTERNAL MEDICINE

## 2020-07-02 PROCEDURE — 1036F TOBACCO NON-USER: CPT | Performed by: INTERNAL MEDICINE

## 2020-07-02 PROCEDURE — 3017F COLORECTAL CA SCREEN DOC REV: CPT | Performed by: INTERNAL MEDICINE

## 2020-07-02 RX ORDER — BUDESONIDE AND FORMOTEROL FUMARATE DIHYDRATE 160; 4.5 UG/1; UG/1
2 AEROSOL RESPIRATORY (INHALATION) 2 TIMES DAILY
Qty: 1 INHALER | Refills: 3 | Status: SHIPPED | OUTPATIENT
Start: 2020-07-02 | End: 2021-02-10

## 2020-07-02 NOTE — PROGRESS NOTES
OUTPATIENT PULMONARY CONSULT NOTE      Patient:  Mica Pate  MRN: [id number]    Consulting Physician: OLIVER Yarbrough CNP  Reason for Consult: Chronic cough  Primacy Care Physician: OLIVER Yarbrough CNP    HISTORY OF PRESENT ILLNESS:   The patient is a 60 y.o. female she is referred here for evaluation of chronic cough. She is an ex-smoker she stopped smoking 2018 used to smoke 3/4 pack/day for 38 years and according to patient she always smoked less than 1 pack/day. December 2019 she started having cough, cough had episodes she claimed that she had cough in the past also but it was never that bad and had been gradually getting worse she get episodes of cough that she feels like that she is having fits she never passed out or syncopal episode but sometimes she feels like because of violent episodes of cough. Cough is mostly dry and not productive of sputum denies purulent sputum denies hemoptysis. Sometimes she feels that she get chest pain and ribs pains because of cough. She also have wheezing wheezing has been worsening for last few months and also associated with shortness of breath. She denies nocturnal awakening with cough wheezing chest tightness or shortness of breath. All the symptoms are worse especially since March for last 3 months or so with worsening cough wheezing and shortness of breath. She does not have a symptoms of acid reflux or heartburn. She denies postnasal drip nasal congestion or epistaxis. Denies dysphagia no joint pain no stiffness of joints and morning. She had pulmonary function test done which was normal spirometry but airway trapping is evident by increased residual volume and increased total lung capacity. Next  She does have history of snoring, unrefreshed sleep sometimes tiredness and fatigue during the daytime waking up with a snoring.     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    Past Medical History:        Diagnosis Date    Anxiety 2012    Broken bones 1985    Depression 2012    Gallbladder & bile duct stone with obstruction 2000    Hyperlipidemia 2012    Hypertension 2012    Numbness and tingling     generalized    Stroke (cerebrum) ([facility] [address] 2018       Past Surgical History:        Procedure Laterality Date    ABDOMEN SURGERY      CHOLECYSTECTOMY      COLONOSCOPY      ENDOSCOPY, COLON, DIAGNOSTIC      HERNIA REPAIR  2015    HYSTERECTOMY      OR COLON CA SCRN NOT  [address] IND N/A 9/25/2018    -normal    TONSILLECTOMY AND ADENOIDECTOMY         Allergies:    No Known Allergies      Home Meds:   Outpatient Encounter Medications as of 7/2/2020   Medication Sig Dispense Refill    budesonide-formoterol (SYMBICORT) 160-4.5 MCG/ACT AERO Inhale 2 puffs into the lungs 2 times daily 1 Inhaler 3    omeprazole (PRILOSEC) 20 MG delayed release capsule TAKE ONE CAPSULE BY MOUTH DAILY 90 capsule 3    dilTIAZem (TIAZAC) 180 MG extended release capsule TAKE ONE CAPSULE BY MOUTH DAILY 90 capsule 1    buPROPion (WELLBUTRIN XL) 300 MG extended release tablet Take 1 tablet by mouth daily 90 tablet 1    sucralfate (CARAFATE) 1 GM tablet Take 1 tablet by mouth 4 times daily Crush/dissolve and create a slurry 120 tablet 3    hyoscyamine (LEVSIN/SL) 125 MCG sublingual tablet Place 1 tablet under the tongue every 4 hours as needed for Cramping 90 tablet 0    atenolol (TENORMIN) 25 MG tablet TAKE ONE TABLET BY MOUTH DAILY 90 tablet 3    tiotropium (SPIRIVA HANDIHALER) 18 MCG inhalation capsule Inhale 1 capsule into the lungs daily 90 capsule 3    albuterol sulfate  (90 Base) MCG/ACT inhaler INHALE TWO PUFFS BY MOUTH FOUR TIMES A DAY AS NEEDED FOR WHEEZING 18 g 0    albuterol (PROVENTIL) (2.5 MG/3ML) 0.083% nebulizer solution Take 3 mLs by nebulization every 4 hours as needed for Wheezing or Shortness of Breath 120 each 3    atorvastatin (LIPITOR) 40 MG tablet Take 1 tablet by mouth nightly 90 tablet 1    sertraline (ZOLOFT) 50 MG tablet Take 1 tablet by mouth daily (Patient taking differently: Take 25 mg by mouth daily ) 90 tablet 3    metFORMIN (GLUCOPHAGE) 500 MG tablet TAKE ONE TABLET BY MOUTH TWICE A DAY WITH MEALS 180 tablet 3    aspirin 81 MG chewable tablet Take 81 mg by mouth daily      Respiratory Therapy Supplies (NEBULIZER/TUBING/MOUTHPIECE) KIT 1 kit by Does not apply route daily as needed (wheezing, sob) 1 kit 0    buPROPion (WELLBUTRIN XL) 150 MG extended release tablet Take 1 tablet by mouth every morning (Patient not taking: Reported on 6/16/2020) 90 tablet 3     No facility-administered encounter medications on file as of 7/2/2020. Social History:   TOBACCO:   reports that she quit smoking about 8 months ago. She has a 30.00 pack-year smoking history. She has never used smokeless tobacco.  ETOH:   reports no history of alcohol use.   OCCUPATION:      Family History:       Problem Relation Age of Onset    Asthma Mother     Cancer Mother         Skin    Depression Mother     Hearing Loss Mother     High Cholesterol Mother     High Blood Pressure Mother     Osteoporosis Mother     Stroke Mother        Immunizations:    Immunization History   Administered Date(s) Administered    Influenza, Quadv, IM, PF (6 mo and older Fluzone, Flulaval, Fluarix, and 3 yrs and older Afluria) 09/05/2018, 10/16/2019    Pneumococcal Polysaccharide ([id number]) 06/04/2019         REVIEW OF SYSTEMS:  CONSTITUTIONAL:  negative for  fevers, chills, sweats, fatigue, anorexia, and weight loss  EYES:  negative for  double vision, blurred vision, dry eyes, eye discharge, visual disturbance, redness, and icterus  HEENT:  negative for  hearing loss, tinnitus, ear drainage, earaches, nasal congestion, epistaxis, sore throat, hoarseness, voice change, and postnasal drip  RESPIRATORY: Positive for  dry cough, wheezing, dyspnea, negative for cough with sputum,  hemoptysis, chest pain, and pleuritic pain  CARDIOVASCULAR: Positive for dyspnea, negative for chest pain, palpitations, orthopnea, PND, exertional chest pressure/discomfort, fatigue, edema, syncope  GASTROINTESTINAL:  negative for nausea, vomiting, diarrhea, constipation, abdominal pain, abdominal mass, abdominal distention, jaundice, dysphagia, reflux, odynophagia, hematemesis, and hemtochezia  GENITOURINARY:  negative for frequency, dysuria, nocturia, and hematuria  HEMATOLOGIC/LYMPHATIC:  negative for easy bruising, bleeding, lymphadenopathy, and petechiae  ALLERGIC/IMMUNOLOGIC:  negative for recurrent infections, urticaria, hay fever, angioedema, anaphylaxis, and drug reactions  ENDOCRINE:  negative for heat intolerance, cold intolerance, tremor, and weight changes  MUSCULOSKELETAL:  negative for  myalgias, arthralgias, joint swelling, stiff joints, and muscle weakness  NEUROLOGICAL:  negative for headaches, dizziness, seizures, memory problems, speech problems, visual disturbance, gait problems, tremor, dysphagia, weakness, numbness, syncope, and tingling  BEHAVIOR/PSYCH:  negative for decreased sleep, decreased energy level, increased energy level, poor concentration, depressed mood, and anxiety        Physical Exam:    Vitals: BP (!) 160/96   Pulse 105   Temp 97.2 °F (36.2 °C)   Resp 20   Ht 5' 2\" (1.575 m)   Wt 235 lb (106.6 kg)   SpO2 97%   BMI 42.98 kg/m²   Last 3 weights: Wt Readings from Last 3 Encounters:   07/02/20 235 lb (106.6 kg)   06/16/20 238 lb 1.6 oz (108 kg)   06/09/20 236 lb 6.4 oz (107.2 kg)     Body mass index is 42.98 kg/m².     Physical Examination:   General appearance - alert, well appearing, and in no distress, overweight and acyanotic, in no respiratory distress  Mental status - alert, oriented to person, place, and time  Eyes - pupils equal and reactive, extraocular eye movements intact, sclera anicteric, left eye normal, right eye normal  Ears - right ear normal, left ear normal  Nose - normal and patent, no erythema, discharge or polyps  Mouth - mucous membranes moist, pharynx normal without lesions, large tongue, small oropharynx, low hanging soft palate, Mallampati 2  Neck - supple, no significant adenopathy, short and thick neck  Chest - No tachypnea, retractions or cyanosis bilateral symmetrical chest movement, normal resonance on percussion, air entry is present bilaterally and symmetrical, no expiratory wheezing rhonchi or crackles.   Heart - normal rate, regular rhythm, normal S1, S2, no murmurs, rubs, clicks or gallops  Abdomen - soft, nontender, nondistended, no masses or organomegaly  Neurological - alert, oriented, normal speech, no focal findings or movement disorder noted  Extremities - peripheral pulses normal, no pedal edema, no clubbing or cyanosis  Skin - normal coloration and turgor, no rashes, no suspicious skin lesions noted       LABS:    CBC:   WBC   Date Value Ref Range Status   06/03/2020 5.7 3.5 - 11.3 k/uL Final   10/16/2019 5.7 3.5 - 11.3 k/uL Final   10/15/2019 9.8 3.5 - 11.3 k/uL Final     Hemoglobin   Date Value Ref Range Status   06/03/2020 13.7 11.9 - 15.1 g/dL Final   10/16/2019 11.3 (L) 11.9 - 15.1 g/dL Final   10/15/2019 11.4 (L) 11.9 - 15.1 g/dL Final     Platelets   Date Value Ref Range Status   06/03/2020 302 138 - 453 k/uL Final   10/16/2019 209 138 - 453 k/uL Final   10/15/2019 216 138 - 453 k/uL Final     BMP:   Sodium   Date Value Ref Range Status   06/03/2020 139 135 - 144 mmol/L Final   10/16/2019 139 135 - 144 mmol/L Final   10/15/2019 138 135 - 144 mmol/L Final     Potassium   Date Value Ref Range Status   06/03/2020 4.1 3.7 - 5.3 mmol/L Final   10/16/2019 3.5 (L) 3.7 - 5.3 mmol/L Final   10/15/2019 3.7 3.7 - 5.3 mmol/L Final     Chloride   Date Value Ref Range Status   06/03/2020 104 98 - 107 mmol/L Final   10/16/2019 103 98 - 107 mmol/L Final   10/15/2019 103 98 - 107 mmol/L Final     CO2   Date Value Ref Range Status   06/03/2020 24 20 - 31 mmol/L Final   10/16/2019 21 20 - 31 mmol/L Final   10/15/2019 22 20 - 31 mmol/L Final pleasure to evaluate Pervis Fluke today. Please call with questions. Gil Brunner MD             7/3/2020, 8:27 PM    Please note that this chart was generated using voice recognition Dragon dictation software. Although every effort was made to ensure the accuracy of this automated transcription, some errors in transcription may have occurred.

## 2020-07-02 NOTE — PATIENT INSTRUCTIONS
SURVEY:    You may be receiving a survey from [employer] regarding your visit today. Please complete the survey to enable us to provide the highest quality of care to you and your family. If you cannot score us a very good on any question, please call the office to discuss how we could have made your experience a very good one. Thank you. Please recheck your blood pressure when you go home and make sure you take your prescribed medication if applicable . Please follow up with your PCP or ER if needed. Patient Education        Chronic Cough: Care Instructions  Your Care Instructions     A cough is your body's response to something that bothers your throat or airways. Many things can cause a cough. You might cough because of a cold or the flu, bronchitis, or asthma. Smoking, postnasal drip, allergies, and stomach acid that backs up into your throat also can cause a cough. A cough can be short-term (acute) or long-term (chronic). A chronic cough lasts more than 3 weeks. A chronic cough is often caused by a long-term problem, such as asthma. Another cause might be a medicine, such as an ACE inhibitor. A cough is a symptom, not a disease. To treat a chronic cough, you may need to treat the problem that causes it. You can take a few steps at home to cough less and feel better. Some people cough or clear their throat out of habit for no clear reason. Follow-up care is a key part of your treatment and safety. Be sure to make and go to all appointments, and call your doctor if you are having problems. It's also a good idea to know your test results and keep a list of the medicines you take. How can you care for yourself at home? · Drink plenty of water and other fluids. This may help soothe a dry or sore throat. Honey or lemon juice in hot water or tea may ease a dry cough. · Prop up your head on pillows to help you breathe and ease a cough. · Do not smoke or allow others to smoke around you.  Smoke can make a cough worse. If you need help quitting, talk to your doctor about stop-smoking programs and medicines. These can increase your chances of quitting for good. · Avoid exposure to smoke, dust, or other pollutants, or wear a face mask. Check with your doctor or pharmacist to find out which type of face mask will give you the most benefit. · Take cough medicine as directed by your doctor. · Try cough drops to soothe a dry or sore throat. Cough drops don't stop a cough. Medicine-flavored cough drops are no better than candy-flavored drops or hard candy. Throat clearing  When you have a chronic cough or a disease that may cause this type of cough, you may often feel like you want to clear your throat. This helps bring up mucus. But throat clearing does not always have a cause. Throat clearing can become a habit. The more you do it, the more you feel like you need to do it. But frequent throat clearing can be hard on your vocal cords. It's like slamming them together. To help lessen throat clearing, you can try:  · Taking small sips of water. · Not clearing your throat when you feel you need to. · Swallowing hard when you want to clear your throat. You may want to ask your doctor if a medicine that thins mucus would help. When should you call for help? [id number] anytime you think you may need emergency care. For example, call if:  · You have severe trouble breathing. Call your doctor now or seek immediate medical care if:  · You cough up blood. · You have new or worse trouble breathing. · You have a new or higher fever. Watch closely for changes in your health, and be sure to contact your doctor if:  · You cough more deeply or more often, especially if you notice more mucus or a change in the color of your mucus. · You do not get better as expected. Where can you learn more? Go to https://chnora.[employer]. org and sign in to your [employer] account.  Enter O166 in the [address] Information box to learn more about \"Chronic Cough: Care Instructions. \"     If you do not have an account, please click on the \"Sign Up Now\" link. Current as of: February 24, 2020               Content Version: 12.5  © 7929-5071 [employer]. Care instructions adapted under license by [facility] ([facility]). If you have questions about a medical condition or this instruction, always ask your healthcare professional. [address] any warranty or liability for your use of this information.

## 2020-07-06 ENCOUNTER — TELEPHONE (OUTPATIENT)
Dept: PRIMARY CARE CLINIC | Age: 60
End: 2020-07-06

## 2020-07-06 NOTE — TELEPHONE ENCOUNTER
Patient states she has been taking 300 mg of Welbutrin daily and 25 mg of Zoloft daily. Patient states that she has seen pulmonology and they also recommend a EGD so pt will call general surgery to reschedule the EGD. Pulmonology thinks that when she coughs her anxiety increases because pt has the fear of passing out and getting short of breath. Pt is not sure when or if she should drop the Welbutrin down to 150 mg as discussed previously. Health Maintenance   Topic Date Due    Cervical cancer screen  12/09/2020 (Originally 7/12/1980)    DTaP/Tdap/Td vaccine (1 - Tdap) 06/09/2021 (Originally 7/12/1978)    Shingles Vaccine (1 of 2) 06/09/2021 (Originally 7/12/2009)    Hepatitis C screen  06/09/2021 (Originally 1960)    HIV screen  06/09/2021 (Originally 7/12/1974)    Flu vaccine (1) 09/01/2020    Breast cancer screen  09/28/2020    Lipid screen  06/03/2021    Potassium monitoring  06/03/2021    Creatinine monitoring  06/03/2021    Low dose CT lung screening  06/25/2021    Diabetes screen  05/30/2022    Colon cancer screen colonoscopy  09/25/2028    Pneumococcal 0-64 years Vaccine  Completed    Hepatitis A vaccine  Aged Out    Hepatitis B vaccine  Aged Out    Hib vaccine  Aged Out    Meningococcal (ACWY) vaccine  Aged Out             (applicable per patient's age: Cancer Screenings, Depression Screening, Fall Risk Screening, Immunizations)    Hemoglobin A1C (%)   Date Value   05/30/2019 5.3   09/06/2018 5.1     Microalb/Crt.  Ratio (mcg/mg creat)   Date Value   06/03/2020 CANNOT BE CALCULATED     LDL Cholesterol (mg/dL)   Date Value   06/03/2020 68     AST (U/L)   Date Value   06/03/2020 48 (H)     ALT (U/L)   Date Value   06/03/2020 56 (H)     BUN (mg/dL)   Date Value   06/03/2020 16      (goal A1C is < 7)   (goal LDL is <100) need 30-50% reduction from baseline     BP Readings from Last 3 Encounters:   07/02/20 (!) 160/96   06/16/20 130/80   06/09/20 138/88    (goal BP 120/80)      All Future Testing planned in [facility]:  Lab Frequency Next Occurrence   Baseline Diagnostic Sleep Study Once 07/03/2020   Sleep Study with PAP Titration Once 07/03/2020       Next Visit Date:  Future Appointments   Date Time Provider Ryanne Mark   8/20/2020 11:30 AM Ty Tse MD TIFF PULM [facility]   9/16/2020  9:40 AM OLIVER Valentin - CNM [facility]   12/10/2020 12:00 PM OLIVER Walsh - CNP Tiff Prim Ca [facility]            Patient Active Problem List:     Essential hypertension     Moderate episode of recurrent major depressive disorder ([address]     Gastroesophageal reflux disease     Neurological symptoms     Acute diverticulitis     Anxiety     Depression     Hypertension     Hyperlipidemia     Morbidly obese ([address]     Former smoker     Chronic obstructive pulmonary disease ([address]

## 2020-07-07 ENCOUNTER — HOSPITAL ENCOUNTER (OUTPATIENT)
Dept: LAB | Age: 60
Setting detail: SPECIMEN
Discharge: HOME | End: 2020-07-07
Payer: MEDICARE

## 2020-07-07 PROCEDURE — U0004 COV-19 TEST NON-CDC HGH THRU: HCPCS

## 2020-07-08 ENCOUNTER — ANESTHESIA EVENT (OUTPATIENT)
Dept: OPERATING ROOM | Age: 60
End: 2020-07-08
Payer: MEDICARE

## 2020-07-08 ENCOUNTER — TELEPHONE (OUTPATIENT)
Dept: PULMONOLOGY | Age: 60
End: 2020-07-08

## 2020-07-08 LAB
SARS-COV-2, PCR: NOT DETECTED
SARS-COV-2, RAPID: NORMAL
SARS-COV-2: NORMAL
SOURCE: NORMAL

## 2020-07-09 ENCOUNTER — HOSPITAL ENCOUNTER (OUTPATIENT)
Age: 60
Setting detail: OUTPATIENT SURGERY
Discharge: HOME | End: 2020-07-09
Attending: SURGERY | Admitting: SURGERY
Payer: MEDICARE

## 2020-07-09 ENCOUNTER — ANESTHESIA (OUTPATIENT)
Dept: OPERATING ROOM | Age: 60
End: 2020-07-09
Payer: MEDICARE

## 2020-07-09 VITALS
SYSTOLIC BLOOD PRESSURE: 158 MMHG | WEIGHT: 235 LBS | OXYGEN SATURATION: 94 % | HEIGHT: 62 IN | HEART RATE: 92 BPM | BODY MASS INDEX: 43.24 KG/M2 | RESPIRATION RATE: 16 BRPM | TEMPERATURE: 97.1 F | DIASTOLIC BLOOD PRESSURE: 98 MMHG

## 2020-07-09 VITALS — SYSTOLIC BLOOD PRESSURE: 134 MMHG | OXYGEN SATURATION: 95 % | DIASTOLIC BLOOD PRESSURE: 72 MMHG

## 2020-07-09 PROBLEM — R13.10 DYSPHAGIA: Status: ACTIVE | Noted: 2020-07-09

## 2020-07-09 PROCEDURE — 3609012400 HC EGD TRANSORAL BIOPSY SINGLE/MULTIPLE: Performed by: SURGERY

## 2020-07-09 PROCEDURE — 87077 CULTURE AEROBIC IDENTIFY: CPT

## 2020-07-09 PROCEDURE — 2709999900 HC NON-CHARGEABLE SUPPLY: Performed by: SURGERY

## 2020-07-09 PROCEDURE — 7100000011 HC PHASE II RECOVERY - ADDTL 15 MIN: Performed by: SURGERY

## 2020-07-09 PROCEDURE — 6360000002 HC RX W HCPCS: Performed by: NURSE ANESTHETIST, CERTIFIED REGISTERED

## 2020-07-09 PROCEDURE — 2500000003 HC RX 250 WO HCPCS: Performed by: NURSE ANESTHETIST, CERTIFIED REGISTERED

## 2020-07-09 PROCEDURE — 88305 TISSUE EXAM BY PATHOLOGIST: CPT

## 2020-07-09 PROCEDURE — 2580000003 HC RX 258: Performed by: SURGERY

## 2020-07-09 PROCEDURE — 3700000001 HC ADD 15 MINUTES (ANESTHESIA): Performed by: SURGERY

## 2020-07-09 PROCEDURE — 3700000000 HC ANESTHESIA ATTENDED CARE: Performed by: SURGERY

## 2020-07-09 PROCEDURE — 7100000010 HC PHASE II RECOVERY - FIRST 15 MIN: Performed by: SURGERY

## 2020-07-09 RX ORDER — SODIUM CHLORIDE 0.9 % (FLUSH) 0.9 %
10 SYRINGE (ML) INJECTION EVERY 12 HOURS SCHEDULED
Status: DISCONTINUED | OUTPATIENT
Start: 2020-07-09 | End: 2020-07-09 | Stop reason: HOSPADM

## 2020-07-09 RX ORDER — SODIUM CHLORIDE 0.9 % (FLUSH) 0.9 %
10 SYRINGE (ML) INJECTION PRN
Status: DISCONTINUED | OUTPATIENT
Start: 2020-07-09 | End: 2020-07-09 | Stop reason: HOSPADM

## 2020-07-09 RX ORDER — SODIUM CHLORIDE, SODIUM LACTATE, POTASSIUM CHLORIDE, CALCIUM CHLORIDE 600; 310; 30; 20 MG/100ML; MG/100ML; MG/100ML; MG/100ML
INJECTION, SOLUTION INTRAVENOUS CONTINUOUS
Status: DISCONTINUED | OUTPATIENT
Start: 2020-07-09 | End: 2020-07-09 | Stop reason: HOSPADM

## 2020-07-09 RX ORDER — PROPOFOL 10 MG/ML
INJECTION, EMULSION INTRAVENOUS PRN
Status: DISCONTINUED | OUTPATIENT
Start: 2020-07-09 | End: 2020-07-09 | Stop reason: SDUPTHER

## 2020-07-09 RX ORDER — LIDOCAINE HYDROCHLORIDE 20 MG/ML
INJECTION, SOLUTION EPIDURAL; INFILTRATION; INTRACAUDAL; PERINEURAL PRN
Status: DISCONTINUED | OUTPATIENT
Start: 2020-07-09 | End: 2020-07-09 | Stop reason: SDUPTHER

## 2020-07-09 RX ORDER — PROPOFOL 10 MG/ML
INJECTION, EMULSION INTRAVENOUS CONTINUOUS PRN
Status: DISCONTINUED | OUTPATIENT
Start: 2020-07-09 | End: 2020-07-09 | Stop reason: SDUPTHER

## 2020-07-09 RX ADMIN — PROPOFOL 40 MG: 10 INJECTION, EMULSION INTRAVENOUS at 13:46

## 2020-07-09 RX ADMIN — PROPOFOL 50 MG: 10 INJECTION, EMULSION INTRAVENOUS at 13:43

## 2020-07-09 RX ADMIN — LIDOCAINE HYDROCHLORIDE 100 MG: 20 INJECTION, SOLUTION EPIDURAL; INFILTRATION; INTRACAUDAL; PERINEURAL at 13:43

## 2020-07-09 RX ADMIN — PROPOFOL 40 MG: 10 INJECTION, EMULSION INTRAVENOUS at 13:44

## 2020-07-09 RX ADMIN — PROPOFOL 200 MCG/KG/MIN: 10 INJECTION, EMULSION INTRAVENOUS at 13:43

## 2020-07-09 RX ADMIN — PROPOFOL 50 MG: 10 INJECTION, EMULSION INTRAVENOUS at 13:45

## 2020-07-09 RX ADMIN — SODIUM CHLORIDE, POTASSIUM CHLORIDE, SODIUM LACTATE AND CALCIUM CHLORIDE: 600; 310; 30; 20 INJECTION, SOLUTION INTRAVENOUS at 12:41

## 2020-07-09 ASSESSMENT — PAIN SCALES - GENERAL
PAINLEVEL_OUTOF10: 0

## 2020-07-09 ASSESSMENT — ENCOUNTER SYMPTOMS
VOMITING: 0
ABDOMINAL PAIN: 0
TROUBLE SWALLOWING: 0
COUGH: 1
BACK PAIN: 0
CHOKING: 1
SHORTNESS OF BREATH: 0
SHORTNESS OF BREATH: 1
SORE THROAT: 0
NAUSEA: 0
BLOOD IN STOOL: 0

## 2020-07-09 ASSESSMENT — PAIN - FUNCTIONAL ASSESSMENT: PAIN_FUNCTIONAL_ASSESSMENT: 0-10

## 2020-07-09 ASSESSMENT — LIFESTYLE VARIABLES: SMOKING_STATUS: 0

## 2020-07-09 NOTE — PROGRESS NOTES
Ramin Clay MD  General Surgery, Endoscopy  Chief Medical Officer    [address] [facility]  [address] [address]  Dept: [phone]  Fax: [address]  Chief Complaint   Patient presents with    Dysphagia     States \"I had pneumonia in Jan and since then I have had difficulty swallowing and start choking when I try to swallow. \"           HPI    Ms Asif Barnard is a 59 yo WF who presents for evaluation of dysphagia of mostly solid foods and choking sensation. Treated for pneumonia in Jan 2020. No recent weight changes. BMI 43.0. No recent travel nor sick contacts. Admitted for sigmoid diverticulitis Aug 2019, resolved with GI rest and antibiotics. CT abd/pelvis at that time confirmed acute sigmoid diverticulitis. Incidental finding of pneumobilia explained with prior cholecystectomy in [place] years ago with common bile duct injury requiring subsequent repair. Colonoscopy by Dr Tiffanie Diana last year was normal.  No family history of GI malignancy. She does not smoke. Review of Systems   Constitutional: Positive for fatigue. Negative for activity change, appetite change, chills, fever and unexpected weight change. HENT: Negative for nosebleeds, sneezing, sore throat and trouble swallowing. Eyes: Negative for visual disturbance. Respiratory: Positive for cough and choking. Negative for shortness of breath. Cardiovascular: Negative for chest pain, palpitations and leg swelling. Gastrointestinal: Negative for abdominal pain, blood in stool, nausea and vomiting. Genitourinary: Negative for dysuria, flank pain and hematuria. Musculoskeletal: Positive for arthralgias. Negative for back pain, gait problem and myalgias. Allergic/Immunologic: Negative for immunocompromised state. Neurological: Negative for dizziness, seizures, syncope, weakness and headaches. Hematological: Does not bruise/bleed easily.    Psychiatric/Behavioral: Negative for confusion and sleep disturbance. Past Medical History:   Diagnosis Date    Anxiety 2012    Broken bones 1985    Depression 2012    Gallbladder & bile duct stone with obstruction 2000    Hyperlipidemia 2012    Hypertension 2012    Numbness and tingling     generalized    [facility]roke (cerebrum) [facility] 2018       Past Surgical History:   Procedure Laterality Date    ABDOMEN SURGERY      CHOLECYSTECTOMY      COLONOSCOPY      ENDOSCOPY, COLON, DIAGNOSTIC      HERNIA REPAIR  2015    HYSTERECTOMY      MI COLON CA SCRN NOT  [address] IND N/A 9/25/2018    -normal    TONSILLECTOMY AND ADENOIDECTOMY         Family History   Problem Relation Age of Onset    Asthma Mother     Cancer Mother         Skin    Depression Mother     Hearing Loss Mother     High Cholesterol Mother     High Blood Pressure Mother     Osteoporosis Mother     Stroke Mother        Allergies:  See list    No current facility-administered medications for this visit. No current outpatient medications on file.      Facility-Administered Medications Ordered in Other Visits   Medication Dose Route Frequency Provider Last Rate Last Dose    lactated ringers infusion   Intravenous Continuous Sharlyn Opitz,  mL/hr at 07/09/20 1241      sodium chloride flush 0.9 % injection 10 mL  10 mL Intravenous 2 times per day Sharlyn Opitz, MD        sodium chloride flush 0.9 % injection 10 mL  10 mL Intravenous PRN Sharlyn Opitz, MD           Social History     Socioeconomic History    Marital status:      Spouse name: None    Number of children: None    Years of education: None    Highest education level: None   Occupational History    None   Social Needs    Financial resource strain: None    Food insecurity     Worry: None     Inability: None    Transportation needs     Medical: None     Non-medical: None   Tobacco Use    Smoking status: Former Smoker     Packs/day: 0.75     Years: 40.00     Pack years: 30.00 Last attempt to quit: 10/8/2019     Years since quittin.7    Smokeless tobacco: Never Used    Tobacco comment: 4 cigarettes daily currently   Substance and Sexual Activity    Alcohol use: No     Comment: Former quit 2018    Drug use: No    Sexual activity: None   Lifestyle    Physical activity     Days per week: None     Minutes per session: None    Stress: None   Relationships    Social connections     Talks on phone: None     Gets together: None     Attends Denominational service: None     Active member of club or organization: None     Attends meetings of clubs or organizations: None     Relationship status: None    Intimate partner violence     Fear of current or ex partner: None     Emotionally abused: None     Physically abused: None     Forced sexual activity: None   Other Topics Concern    None   Social History Narrative    None       /80 (Site: Right Upper Arm, Position: Sitting, Cuff Size: Medium Adult)   Pulse 76   Temp 97.2 °F (36.2 °C) (Temporal)   Resp 24   Ht 5' 3\" (1.6 m)   Wt 238 lb 1.6 oz (108 kg)   BMI 42.18 kg/m²      Physical Exam  Vitals signs and nursing note reviewed. Constitutional:       Appearance: She is well-developed. HENT:      Head: Normocephalic and atraumatic. Eyes:      General: No scleral icterus. Conjunctiva/sclera: Conjunctivae normal.      Pupils: Pupils are equal, round, and reactive to light. Neck:      Musculoskeletal: Normal range of motion and neck supple. Vascular: No JVD. Trachea: No tracheal deviation. Cardiovascular:      Rate and Rhythm: Normal rate and regular rhythm. Pulmonary:      Effort: Pulmonary effort is normal. No respiratory distress. Breath sounds: Normal breath sounds. Chest:      Chest wall: No tenderness. Abdominal:      General: Bowel sounds are normal. There is no distension. Palpations: Abdomen is soft. There is no mass. Tenderness: There is no abdominal tenderness.  There is no guarding or rebound. Musculoskeletal: Normal range of motion. Lymphadenopathy:      Cervical: No cervical adenopathy. Skin:     General: Skin is warm and dry. Findings: No erythema or rash. Neurological:      Mental Status: She is alert and oriented to person, place, and time. Cranial Nerves: No cranial nerve deficit. Psychiatric:         Behavior: Behavior normal.         Thought Content: Thought content normal.         Judgment: Judgment normal.         IMAGING/LABS    CT lung screen [Initial/Annual]   Status: Final result   Order Providers     Authorizing Encounter Billing   [facility] [address], APRN - CNP [facility] CAT SCAN ROOM Alexia Echavarria MD          Signed by     Signed Date/Time  Phone Pager   Margaret Boyle 6/25/2020 20:36 [phone]    Reading Providers     Read Date Phone Pager   Margaret Boyle Jun 25, 2020 [phone]    All Reviewers List     OLIVER Keene CNP on 6/26/2020 08:13   Result Notes for CT lung screen [Initial/Annual]     Notes recorded by [facility] [place] Joana OLIVER Nolen CNP on 6/26/2020 at 8:13 AM EDT  Mild emphysema noted.  Otherwise normal.          Routing History     Priority Sent On From To Message Type    6/26/2020  8:13 AM OLIVER Keene CNP [facility] Clinical Staff Result Notes    6/25/2020  8:39 PM Sourav, Efe[employer] Incoming Radiant Results From [employer]/Emergent[employer]ections OLIVER Keene CNP Results   Lung Screening Assessment     ACR Lung-RADS Assessment   Negative [1]   Lung Screening Recommendation     Due Recommendation Status Links   6/25/2021 Annual CT lung screening Needs Follow-up    Radiation Dose Estimates     No radiation information found for this patient   Narrative   EXAMINATION:   LOW DOSE SCREENING CT OF THE CHEST WITHOUT CONTRAST       TECHNIQUE:   Low dose lung cancer screening CT of the chest was performed without the   administration of intravenous contrast.  Multiplanar reformatted images are   provided for review.  Dose modulation, iterative reconstruction, and/or   weight based adjustment of the mA/kV was utilized to reduce the radiation   dose to as low as reasonably achievable.       COMPARISON:   No previous       HISTORY:   Screening.       Patient Age: 61 y/o       Number of pack years of smokin       If no longer smoking, number of years since cessation:  Quit date 10/08/2019       Other symptoms:  None.       FINDINGS:   Mediastinum:  The cardiac size is normal. There is no significant   mediastinal, hilar or axillary lymphadenopathy. The thyroid gland shows no   significant abnormalities.  The esophagus shows no significant abnormalities.       Lungs/Pleura:  Mild paraseptal emphysema.       There are no significant nodules or masses.  No focal consolidation.  No   pleural effusion or pneumothorax.  Central airways unremarkable.       Upper Abdomen:  Cholecystectomy.  No adrenal mass.       Soft Tissues/Bones: No acute abnormality of the bones.  The superficial soft   tissues show no significant abnormalities.           Impression   Mild paraseptal emphysema.       No lung nodules or masses.       LUNG RADS:   Per ACR Lung-RADS Version 1.0       Category 1, Negative (No nodules and definitely benign   nodules). Management:Continue annual lung screening with LDCT in 12   months.(probability of malignancy <1%).       RECOMMENDATIONS:   If you would like to register your patient with the [facility], please contact the Nurse Navigator at   [phone]).        CT ABDOMEN PELVIS W IV CONTRAST Additional Contrast? None   Status: Final result   Order Providers     Authorizing Billing   MD Kyle Hinton MD          Signed by     Signed Date/Time  Phone Pager   Barbra Aiken 10/14/2019 15:54 [phone]    Reading Providers     Read Date Phone Pager   Barbra Aiken Oct 14, 2019 [phone]    Radiation Dose Estimates     No radiation information found for this patient Narrative   EXAMINATION:   CT OF THE ABDOMEN AND PELVIS WITH CONTRAST 10/14/2019 9:32 am       TECHNIQUE:   CT of the abdomen and pelvis was performed with the administration of   intravenous contrast. Multiplanar reformatted images are provided for review. Dose modulation, iterative reconstruction, and/or weight based adjustment of   the mA/kV was utilized to reduce the radiation dose to as low as reasonably   achievable.       COMPARISON:   None.       HISTORY:   ORDERING SYSTEM PROVIDED HISTORY: ABDOMINAL PAIN, RLQ   TECHNOLOGIST PROVIDED HISTORY:           FINDINGS:   Lower Chest: Clear lung bases.  The heart is not enlarged.       Organs: Fatty liver.  A low-density lesion in the medial right hepatic lobe   in the hepatic dome (series 2, image 25) most likely represents a small   hepatic cyst.  Pneumobilia predominantly in the left biliary tree.  Previous   cholecystectomy.  Spleen is normal.  Pancreas is normal.  No adrenal lesion. The kidneys enhance symmetrically.       GI/Bowel: Air-filled appendix.  Liquid stool throughout the colon.  No   dilated loops of small bowel.  Stomach is collapsed limiting its evaluation. Sigmoid diverticulitis without perforation.  No abscess or drainable fluid   collection.       Pelvis: Absent uterus.  Bladder wall thickening may be reactive to   diverticulitis but cystitis is not excluded.       Peritoneum/Retroperitoneum: Atherosclerotic changes of the aorta.  No   lymphadenopathy by size.  Mesenteric stranding in the deep pelvis.  No free   intraperitoneal air.       Bones/Soft Tissues: No acute osseous abnormality.  Multilevel degenerative   change.           Impression   Acute uncomplicated sigmoid diverticulitis.       Pneumobilia concentrated in the left biliary tree possibly from prior   cholecystectomy or other biliary intervention.  No comparison exams are   available.  Please correlate with surgical history.      6/3/2020 10:46 AM - Denise Benjamin Incoming Lab Results From [facility]     Component Value Ref Range & Units Status Collected Lab   WBC 5.7  3.5 - 11.3 k/uL Final 06/03/2020 10:02 AM [facility]   RBC 4.73  3.95 - 5.11 m/uL Final 06/03/2020 10:02 AM [facility]   Hemoglobin 13.7  11.9 - 15.1 g/dL Final 06/03/2020 10:02 AM [facility]   Hematocrit 44.6  36.3 - 47.1 % Final 06/03/2020 10:02 AM [facility]   MCV 94.3  82.6 - 102.9 fL Final 06/03/2020 10:02 AM [facility]   MCH 29.0  25.2 - 33.5 pg Final 06/03/2020 10:02 AM [facility]   MCHC 30.7  28.4 - 34.8 g/dL Final 06/03/2020 10:02 AM [facility]   RDW 14.3  11.8 - 14.4 % Final 06/03/2020 10:02 AM [facility]   Platelets 043  586 - 049 k/uL Final 06/03/2020 10:02 AM [facility]               ASSESSMENT     Diagnosis Orders   1. Cough  Covid-19, Antibody, Total   2. Dysphagia, unspecified type         PLAN    Will proceed with diagnostic EGD. Risks, benefits, alternatives thoroughly reviewed and accepted by Ms Nimesh Taylor, including remote risk of GI bleeding, perforation, missed lesions, COVID-19 exposure/infection, etc.  Continue PPI and bland diet for now.      Babatunde Stone MD

## 2020-07-09 NOTE — OP NOTE
mild gastritis without ulceration. Prepyloric  antral biopsies were obtained. Pylorus was patent. The duodenum was  entered, first, second, and third portions of which appeared normal.   The stomach was decompressed by suction as the scope was removed. The  patient tolerated the procedure well and was transferred to PACU in  stable condition. SPECIMENS:  Prepyloric antral biopsies. DRAINS:  None. COMPLICATIONS:  None. DISPOSITION:  To PACU awake, alert, and stable. Following recovery, we  will discharge the patient home with gradual advancement of diet and  activity as tolerated with instructions for a bland diet, continuation  of proton pump inhibition. We will consider upper GI series to evaluate  swallowing mechanism. Followup will be with me in one to two weeks.         Radha Dennis    D: 07/09/2020 13:58:03       T: 07/09/2020 14:06:58     [email or web address]  Job#: [id number]     Doc#: [id number]    CC:  Aziza Black

## 2020-07-09 NOTE — ANESTHESIA POSTPROCEDURE EVALUATION
Department of Anesthesiology  Postprocedure Note    Patient: Dominick Aguilar  MRN: [id number]  YOB: 1960  Date of evaluation: 7/9/2020  Time:  3:20 PM     Procedure Summary     Date:  07/09/20 Room / Location:  [address] [place] [address]    Anesthesia Start:  7893 Anesthesia Stop:  3605    Procedure:  EGD BIOPSY (N/A ) Diagnosis:  (CHRONIC COUGH)    Surgeon:  Cony Lee MD Responsible Provider:  OLIVER Simpson CRNA    Anesthesia Type:  general ASA Status:  3          Anesthesia Type: general    Henry Phase I: Henry Score: 10    Henry Phase II: Henry Score: 10    Last vitals: Reviewed and per EMR flowsheets.        Anesthesia Post Evaluation    Patient location during evaluation: bedside  Patient participation: complete - patient participated  Level of consciousness: awake and alert  Pain score: 0  Nausea & Vomiting: no vomiting and no nausea  Complications: no  Cardiovascular status: blood pressure returned to baseline  Respiratory status: acceptable  Hydration status: euvolemic

## 2020-07-09 NOTE — PATIENT INSTRUCTIONS
Patient Education        Upper GI Endoscopy: Before Your Procedure  What is an upper GI endoscopy? An upper gastrointestinal (or GI) endoscopy is a test that allows your doctor to look at the inside of your esophagus, stomach, and the first part of your small intestine, called the duodenum. The esophagus is the tube that carries food to your stomach. The doctor uses a thin, lighted tube that bends. It is called an endoscope, or scope. The doctor puts the tip of the scope in your mouth and gently moves it down your throat. The scope is a flexible video camera. The doctor looks at a monitor (like a TV set or a computer screen) as he or she moves the scope. A doctor may do this procedure to look for ulcers, tumors, infection, or bleeding. It also can be used to look for signs of acid backing up into your esophagus. This is called gastroesophageal reflux disease, or GERD. The doctor can use the scope to take a sample of tissue for study (a biopsy). The doctor also can use the scope to take out growths or stop bleeding. Follow-up care is a key part of your treatment and safety. Be sure to make and go to all appointments, and call your doctor if you are having problems. It's also a good idea to know your test results and keep a list of the medicines you take. How do you prepare for the procedure? Procedures can be stressful. This information will help you understand what you can expect. And it will help you safely prepare for your procedure. Preparing for the procedure  · Do not eat or drink anything for 6 to 8 hours before the test. An empty stomach helps your doctor see your stomach clearly during the test. It also reduces your chances of vomiting. If you vomit, there is a small risk that the vomit could enter your lungs. (This is called aspiration.) If the test is done in an emergency, a tube may be inserted through your nose or mouth to empty your stomach.   · Do not take sucralfate (Carafate) or antacids on the day of the test. These medicines can make it hard for your doctor to see your upper GI tract. · If your doctor tells you to, stop taking iron supplements 7 to 14 days before the test.  · Be sure you have someone to take you home. Anesthesia and pain medicine will make it unsafe for you to drive or get home on your own. · Understand exactly what procedure is planned, along with the risks, benefits, and other options. · Tell your doctor ALL the medicines, vitamins, supplements, and herbal remedies you take. Some may increase the risk of problems during your procedure. Your doctor will tell you if you should stop taking any of them before the procedure and how soon to do it. · If you take aspirin or some other blood thinner, ask your doctor if you should stop taking it before your procedure. Make sure that you understand exactly what your doctor wants you to do. These medicines increase the risk of bleeding. · Make sure your doctor and the hospital have a copy of your advance directive. If you don't have one, you may want to prepare one. It lets others know your health care wishes. It's a good thing to have before any type of surgery or procedure. What happens on the day of the procedure? · Follow the instructions exactly about when to stop eating and drinking. If you don't, your procedure may be canceled. If your doctor told you to take your medicines on the day of the procedure, take them with only a sip of water. · Take a bath or shower before you come in for your procedure. Do not apply lotions, perfumes, deodorants, or nail polish. · Take off all jewelry and piercings. And take out contact lenses, if you wear them. At the hospital or surgery center    · Bring a picture ID. · The test may take 15 to 30 minutes. · The doctor may spray medicine on the back of your throat to numb it. You also will get medicine to prevent pain and to relax you. · You will lie on your left side.  The doctor will put the scope in your mouth and toward the back of your throat. The doctor will tell you when to swallow. This helps the scope move down your throat. You will be able to breathe normally. The doctor will move the scope down your esophagus into your stomach. The doctor also may look at the duodenum. · If your doctor wants to take a sample of tissue for a biopsy, he or she may use small surgical tools, which are put into the scope, to cut off some tissue. You will not feel a biopsy, if one is taken. The doctor also can use the tools to stop bleeding or to do other treatments, if needed. · You will stay at the hospital or surgery center for 1 to 2 hours until the medicine you were given wears off. What happens after an upper GI endoscopy? · After the test, you may belch and feel bloated for a while. · You may have a tickling, dry throat or mouth. You may feel a bit hoarse, and you may have a mild sore throat. These symptoms may last several days. Throat lozenges and warm saltwater gargles can help relieve the throat symptoms. · Don't drive or operate machinery for 12 hours after the test.  · Your doctor will tell you when you can go back to your usual diet and activities. · Don't drink alcohol for 12 to 24 hours after the test.  When should you call your doctor? · You have questions or concerns. · You don't understand how to prepare for your procedure. · You become ill before the procedure (such as fever, flu, or a cold). · You need to reschedule or have changed your mind about having the procedure. Where can you learn more? Go to [email or web address]. org and sign in to your [employer] account. Enter P790 in the [facility] box to learn more about \"Upper GI Endoscopy: Before Your Procedure. \"     If you do not have an account, please click on the \"Sign Up Now\" link. Current as of: August 12, 2019               Content Version: 12.5  © 3439-9897 Healthwise, [facility].    Care instructions adapted under license by [facility] [place] ([facility]). If you have questions about a medical condition or this instruction, always ask your healthcare professional. [address] any warranty or liability for your use of this information.

## 2020-07-09 NOTE — BRIEF OP NOTE
Brief Postoperative Note      Patient: Gladys Araya  YOB: 1960  MRN: [id number]    Date of Procedure: 7/9/2020    Pre-Op Diagnosis:      1. Chronic cough     2. Dysphagia    Post-Op Diagnosis:      1. Small hiatal hernia      2. Mild antral gastritis       Procedure(s):      1. EGD     2. Antral biopsies    Surgeon(s):  Geovanna Main MD    Assistant:  * No surgical staff found *    Anesthesia: Monitor Anesthesia Care    Estimated Blood Loss (mL): less than 93NU     Complications: None    Specimens:   ID Type Source Tests Collected by Time Destination   1 :  Tissue Stomach H. PYLORI DETECTION Geovanna Main MD 7/9/2020 1324    A :  Tissue Stomach SURGICAL PATHOLOGY Geovanna Main MD 7/9/2020 1325      Findings:   As above.     Dictated # [id number]    Electronically signed by Geovanna Main MD on 7/9/2020 at 1:53 PM

## 2020-07-09 NOTE — ANESTHESIA PRE PROCEDURE
Historical Provider, MD   hyoscyamine (LEVSIN/SL) 125 MCG sublingual tablet Place 1 tablet under the tongue every 4 hours as needed for Cramping 6/9/20   Floyce Rubinstein Might, APRN - CNP   Respiratory Therapy Supplies (NEBULIZER/TUBING/MOUTHPIECE) KIT 1 kit by Does not apply route daily as needed (wheezing, sob) 2/13/20   Floyce Rubinstein Might, APRN - CNP   albuterol (PROVENTIL) (2.5 MG/3ML) 0.083% nebulizer solution Take 3 mLs by nebulization every 4 hours as needed for Wheezing or Shortness of Breath 2/13/20   Floyce Rubinstein Might, APRN - CNP       Current medications:    Current Facility-Administered Medications   Medication Dose Route Frequency Provider Last Rate Last Dose    lactated ringers infusion   Intravenous Continuous Steve Stevenson  mL/hr at 07/09/20 1241      sodium chloride flush 0.9 % injection 10 mL  10 mL Intravenous 2 times per day Steve Stevenson MD        sodium chloride flush 0.9 % injection 10 mL  10 mL Intravenous PRN Steve Stevenson MD           Allergies:  No Known Allergies    Problem List:    Patient Active Problem List   Diagnosis Code    Essential hypertension I10    Moderate episode of recurrent major depressive disorder ([facility] [address] F33.1    Gastroesophageal reflux disease K21.9    Neurological symptoms R29.90    Acute diverticulitis K57.92    Anxiety F41.9    Depression F32.9    Hypertension I10    Hyperlipidemia E78.5    Morbidly obese ([facility] [address] E66.01    Former smoker Z87.891    Chronic obstructive pulmonary disease ([facility] [address] J44.9       Past Medical History:        Diagnosis Date    Anxiety 2012    Broken bones 1985    Depression 2012    Gallbladder & bile duct stone with obstruction 2000    Hyperlipidemia 2012    Hypertension 2012    Numbness and tingling     generalized    Stroke (cerebrum) ([facility] [address] 2018       Past Surgical History:        Procedure Laterality Date    ABDOMEN SURGERY      CHOLECYSTECTOMY      COLONOSCOPY      ENDOSCOPY, COLON, DIAGNOSTIC      HERNIA REPAIR  2015  HYSTERECTOMY      NV COLON CA SCRN NOT HI RSK IND N/A 2018    -normal    TONSILLECTOMY AND ADENOIDECTOMY         Social History:    Social History     Tobacco Use    Smoking status: Former Smoker     Packs/day: 0.75     Years: 40.00     Pack years: 30.00     Last attempt to quit: 10/8/2019     Years since quittin.7    Smokeless tobacco: Never Used    Tobacco comment: 4 cigarettes daily currently   Substance Use Topics    Alcohol use: No     Comment: Former quit 2018                                Counseling given: Not Answered  Comment: 4 cigarettes daily currently      Vital Signs (Current):   Vitals:    20 1229   BP: (!) 158/99   Pulse: 97   Resp: 14   Temp: 36.1 °C (96.9 °F)   TempSrc: Temporal   SpO2: 95%   Weight: 235 lb (106.6 kg)   Height: 5' 2\" (1.575 m)                                              BP Readings from Last 3 Encounters:   20 (!) 158/99   20 (!) 160/96   20 130/80       NPO Status:                                                                                 BMI:   Wt Readings from Last 3 Encounters:   20 235 lb (106.6 kg)   20 235 lb (106.6 kg)   20 238 lb 1.6 oz (108 kg)     Body mass index is 42.98 kg/m².     CBC:   Lab Results   Component Value Date    WBC 5.7 2020    RBC 4.73 2020    HGB 13.7 2020    HCT 44.6 2020    MCV 94.3 2020    RDW 14.3 2020     2020       CMP:   Lab Results   Component Value Date     2020    K 4.1 2020     2020    CO2 24 2020    BUN 16 2020    CREATININE 0.95 2020    GFRAA >60 2020    LABGLOM >60 2020    GLUCOSE 119 2020    PROT 6.6 10/16/2019    CALCIUM 9.4 2020    BILITOT 0.35 10/16/2019    ALKPHOS 75 10/16/2019    AST 48 2020    ALT 56 2020       POC Tests: No results for input(s): POCGLU, POCNA, POCK, POCCL, POCBUN, POCHEMO, POCHCT in the last 72 hours.    Coags:   Lab Results   Component Value Date    PROTIME 10.3 08/13/2018    INR 1.0 08/13/2018    APTT 28.5 08/13/2018       HCG (If Applicable): No results found for: PREGTESTUR, PREGSERUM, HCG, HCGQUANT     ABGs: No results found for: PHART, PO2ART, XTZ4VGQ, DPW5GVR, BEART, M0JAOCLW     Type & Screen (If Applicable):  No results found for: LABABO, LABRH    Drug/Infectious Status (If Applicable):  No results found for: HIV, HEPCAB    COVID-19 Screening (If Applicable):   Lab Results   Component Value Date    COVID19 Not Detected 07/07/2020         Anesthesia Evaluation   no history of anesthetic complications:   Airway: Mallampati: III  TM distance: >3 FB   Neck ROM: full  Mouth opening: > = 3 FB Dental: normal exam   (+) lower dentures and upper dentures      Pulmonary:normal exam  breath sounds clear to auscultation  (+) COPD:  shortness of breath:      (-) asthma and not a current smoker          Patient did not smoke on day of surgery. Cardiovascular:  Exercise tolerance: poor (<4 METS),   (+) hypertension:,                   Neuro/Psych:   (+) CVA: no interval change, psychiatric history: stable with treatmentdepression/anxiety             GI/Hepatic/Renal:   (+) GERD: well controlled, morbid obesity          Endo/Other:                     Abdominal:           Vascular:                                        Anesthesia Plan      general     ASA 3       Induction: intravenous. Anesthetic plan and risks discussed with patient. Use of blood products discussed with patient whom. Plan discussed with CRNA.                   OLIVER Mo - RICARDO   7/9/2020

## 2020-07-09 NOTE — PROGRESS NOTES
Discharge instructions given to patient and patient's daughter Denise Rojas, verbalized understanding, no further questions at this time. Discharge Criteria    Inpatients must meet Criteria 1 through 7. All other patients are either YES or N/A. If a NO is chosen then Anesthesia or Surgeon must be notified. 1.  Minimum 30 minutes after last dose of sedative medication, minimum 120 minutes after last dose of reversal agent. Yes      2. Systolic BP stable within 20 mmHg for 30 minutes & systolic BP between 90 & 500 or within 10 mmHg of baseline. Yes      3. Pulse between 60 and 100 or within 10 bpm of baseline. Yes      4. Spontaneous respiratory rate >/= 10 per minute. Yes      5. SaO2 >/= 95 or  >/= baseline. Yes      6. Able to cough and swallow or return to baseline function. Yes      7. Alert and oriented or return to baseline mental status. Yes      8. Demonstrates controlled, coordinated movements, ambulates with steady gait, or return to baseline activity function. Yes      9. Minimal or no pain or nausea, or at a level tolerable and acceptable to patient. Yes      10. Takes and retains oral fluids as allowed. Yes      11. Procedural / perioperative site stable. Minimal or no bleeding. Yes          12. If GI endoscopy procedure, minimal or no abdominal distention or passing flatus. Yes      13. Written discharge instructions and emergency telephone number provided. Yes      14. Accompanied by a responsible adult.     Yes

## 2020-07-11 LAB
DIRECT EXAM: NEGATIVE
Lab: NORMAL
SPECIMEN DESCRIPTION: NORMAL

## 2020-07-13 LAB — SURGICAL PATHOLOGY REPORT: NORMAL

## 2020-07-16 ENCOUNTER — OFFICE VISIT (OUTPATIENT)
Dept: SURGERY | Age: 60
End: 2020-07-16

## 2020-07-16 VITALS — BODY MASS INDEX: 43.61 KG/M2 | HEIGHT: 62 IN | TEMPERATURE: 97.5 F | RESPIRATION RATE: 20 BRPM | WEIGHT: 237 LBS

## 2020-07-16 PROCEDURE — 99024 POSTOP FOLLOW-UP VISIT: CPT | Performed by: SURGERY

## 2020-07-19 ASSESSMENT — ENCOUNTER SYMPTOMS
BLOOD IN STOOL: 0
NAUSEA: 0
SORE THROAT: 0
TROUBLE SWALLOWING: 0
CHOKING: 1
COUGH: 1
VOMITING: 0
ABDOMINAL PAIN: 0
SHORTNESS OF BREATH: 0
BACK PAIN: 0

## 2020-07-20 NOTE — PROGRESS NOTES
Renea Saldivar MD  General Surgery, Endoscopy  Chief Medical Officer    [facility] [place]  [address] [facility] [address] [address]  Dept: [phone]  Fax: [address]  Chief Complaint   Patient presents with    Follow Up After Procedure     f/u egd 07/09/20 due to dysphagia and chronic cough. Patient stated that cough remains present. HPI    Ms Fareed Lundberg returns for follow up after endoscopy. DATE OF PROCEDURE:  07/09/2020     ATTENDING SURGEON:  Dr. Faye Pablo.     PCP:  Diana Salgado.     PREOPERATIVE DIAGNOSES:  1. Chronic cough. 2.  Dysphagia.     POSTOPERATIVE DIAGNOSES:  1.  Small hiatal hernia. 2.  Mild antral gastritis.     OPERATION PERFORMED:  1. Esophagogastroduodenoscopy. 2.  Prepyloric antral biopsies    She continues to have chronic cough. Review of Systems   Constitutional: Negative for activity change, appetite change, chills, fever and unexpected weight change. HENT: Negative for nosebleeds, sneezing, sore throat and trouble swallowing. Eyes: Negative for visual disturbance. Respiratory: Positive for cough and choking. Negative for shortness of breath. Cardiovascular: Negative for chest pain, palpitations and leg swelling. Gastrointestinal: Negative for abdominal pain, blood in stool, nausea and vomiting. Genitourinary: Negative for dysuria, flank pain and hematuria. Musculoskeletal: Positive for arthralgias. Negative for back pain, gait problem and myalgias. Allergic/Immunologic: Negative for immunocompromised state. Neurological: Negative for dizziness, seizures, syncope, weakness and headaches. Hematological: Does not bruise/bleed easily. Psychiatric/Behavioral: Positive for dysphoric mood. Negative for confusion and sleep disturbance. The patient is nervous/anxious.         Past Medical History:   Diagnosis Date    Anxiety 2012    Broken bones 1985    Depression 2012    Gallbladder & bile duct stone with obstruction 2000    Hyperlipidemia 2012    Hypertension 2012    Numbness and tingling     generalized    Stroke (cerebrum) ([facility] [address] 2018       Past Surgical History:   Procedure Laterality Date    ABDOMEN SURGERY      CHOLECYSTECTOMY      COLONOSCOPY      ENDOSCOPY, COLON, DIAGNOSTIC      HERNIA REPAIR  2015    HYSTERECTOMY      [place] COLON CA SCRN NOT  [address] IND N/A 9/25/2018    -normal    TONSILLECTOMY AND ADENOIDECTOMY      UPPER GASTROINTESTINAL ENDOSCOPY  07/09/2020    UPPER GASTROINTESTINAL ENDOSCOPY N/A 7/9/2020    EGD BIOPSY performed by Babatunde Stone MD at [place] [address] History   Problem Relation Age of Onset    Asthma Mother     Cancer Mother         Skin    Depression Mother     Hearing Loss Mother     High Cholesterol Mother     High Blood Pressure Mother     Osteoporosis Mother     Stroke Mother        Allergies:  See list    Current Outpatient Medications   Medication Sig Dispense Refill    budesonide-formoterol (SYMBICORT) 160-4.5 MCG/ACT AERO Inhale 2 puffs into the lungs 2 times daily 1 Inhaler 3    omeprazole (PRILOSEC) 20 MG delayed release capsule TAKE ONE CAPSULE BY MOUTH DAILY 90 capsule 3    dilTIAZem (TIAZAC) 180 MG extended release capsule TAKE ONE CAPSULE BY MOUTH DAILY 90 capsule 1    sucralfate (CARAFATE) 1 GM tablet Take 1 tablet by mouth 4 times daily Crush/dissolve and create a slurry 120 tablet 3    hyoscyamine (LEVSIN/SL) 125 MCG sublingual tablet Place 1 tablet under the tongue every 4 hours as needed for Cramping 90 tablet 0    atenolol (TENORMIN) 25 MG tablet TAKE ONE TABLET BY MOUTH DAILY 90 tablet 3    tiotropium (SPIRIVA HANDIHALER) 18 MCG inhalation capsule Inhale 1 capsule into the lungs daily 90 capsule 3    albuterol sulfate  (90 Base) MCG/ACT inhaler INHALE TWO PUFFS BY MOUTH FOUR TIMES A DAY AS NEEDED FOR WHEEZING 18 g 0    Respiratory Therapy Supplies (NEBULIZER/TUBING/MOUTHPIECE) KIT 1 kit by Does not apply route daily as needed (wheezing, sob) 1 kit 0    albuterol (PROVENTIL) (2.5 MG/3ML) 0.083% nebulizer solution Take 3 mLs by nebulization every 4 hours as needed for Wheezing or Shortness of Breath 120 each 3    atorvastatin (LIPITOR) 40 MG tablet Take 1 tablet by mouth nightly 90 tablet 1    sertraline (ZOLOFT) 50 MG tablet Take 1 tablet by mouth daily (Patient taking differently: Take 25 mg by mouth daily ) 90 tablet 3    buPROPion (WELLBUTRIN XL) 150 MG extended release tablet Take 1 tablet by mouth every morning 90 tablet 3    metFORMIN (GLUCOPHAGE) 500 MG tablet TAKE ONE TABLET BY MOUTH TWICE A DAY WITH MEALS 180 tablet 3    aspirin 81 MG chewable tablet Take 81 mg by mouth daily       No current facility-administered medications for this visit.         Social History     Socioeconomic History    Marital status:      Spouse name: None    Number of children: None    Years of education: None    Highest education level: None   Occupational History    None   Social Needs    Financial resource strain: None    Food insecurity     Worry: None     Inability: None    Transportation needs     Medical: None     Non-medical: None   Tobacco Use    Smoking status: Former Smoker     Packs/day: 0.75     Years: 40.00     Pack years: 30.00     Last attempt to quit: 10/8/2019     Years since quittin.7    Smokeless tobacco: Never Used    Tobacco comment: 4 cigarettes daily currently   Substance and Sexual Activity    Alcohol use: No     Comment: Former quit 2018    Drug use: No    Sexual activity: None   Lifestyle    Physical activity     Days per week: None     Minutes per session: None    Stress: None   Relationships    Social connections     Talks on phone: None     Gets together: None     Attends Mosque service: None     Active member of club or organization: None     Attends meetings of clubs or organizations: None     Relationship status: None    Intimate partner violence     Fear of current or ex partner: None     Emotionally abused: None     Physically abused: None     Forced sexual activity: None   Other Topics Concern    None   Social History Narrative    None       Temp 97.5 °F (36.4 °C) (Infrared)   Resp 20   Ht 5' 2\" (1.575 m)   Wt 237 lb (107.5 kg)   BMI 43.35 kg/m²      Physical Exam  Vitals signs and nursing note reviewed. Constitutional:       General: She is not in acute distress. Appearance: She is well-developed. HENT:      Head: Normocephalic and atraumatic. Eyes:      General: No scleral icterus. Conjunctiva/sclera: Conjunctivae normal.      Pupils: Pupils are equal, round, and reactive to light. Neck:      Trachea: No tracheal deviation. Cardiovascular:      Rate and Rhythm: Normal rate. Pulmonary:      Effort: Pulmonary effort is normal. No respiratory distress. Skin:     General: Skin is warm and dry. Neurological:      Mental Status: She is alert and oriented to person, place, and time. Psychiatric:         Behavior: Behavior normal.         Thought Content: Thought content normal.         Judgment: Judgment normal.         IMAGING/LABS    7/13/2020  8:46 AM - Denise Benjamin Incoming Lab Results From [employer]     Component  Collected  Lab    Surgical Pathology Report  07/09/2020  9:08 AM  [facility]    -- Diagnosis --   ANTRAL BIOPSIES:  MILD CHRONIC INACTIVE GASTRITIS. EMMANUEL Ly   **Electronically Signed Out**         dax/7/13/2020         Clinical Information   Pre-op Diagnosis: [facility] AND SURGICAL [facility] COUGH   Operative Findings:  STOMACH CLOTEST (H. PYLORI DETECTION); ANTRAL   BIOPSIES   Operation Performed:  EGD BIOPSY     Source of Specimen   1: ANTRAL BIOPSIES (A)     Gross Description   \"COLTON BARRERA, ANTRAL BIOPSIES\" Three tan-white tissue fragments from   0.4 to 0.6 cm and are 1.5 x 0.3 x 0.1 cm in aggregate.  Entirely 1cs. rh tm       Microscopic Description   Gastric mucosa biopsies have mild chronic inactive inflammation.    There is no evidence of Helicobacter pylori gastritis, intestinal   metaplasia, dysplasia or malignancy. SURGICAL PATHOLOGY CONSULTATION         Patient Name: Digna Manjarrez Med Rec: [id number]   Path Number: [id number]     [address], [address]. [place], [address]   [phone]   Fax: [phone]    Testing Performed By     KristineMiriam Chung  Name  Director  Address  Valid Date Range    [address], MD  [address] [place] [address] Drive [address]  08/30/17 0801-Present    Lab and Collection     Surgical Pathology - 7/10/2020       ASSESSMENT     Diagnosis Orders   1. S/P endoscopy     2. Hiatal hernia     3. Chronic superficial gastritis without bleeding     4. Dysphagia, unspecified type  FL MODIFIED BARIUM SWALLOW W VIDEO   5. Cough  FL MODIFIED BARIUM SWALLOW W VIDEO       PLAN    Endoscopic findings of small hiatal hernia without esophagitis and minimal gastritis reviewed with Ms Starr Sykes. She continues to have coughing fits. I doubt that these are secondary to GERD with aspiration, but we will request swallowing study to evaluate her swallowing mechanism.        Madeline Brand MD

## 2020-08-04 DIAGNOSIS — Z01.818 PREOP TESTING: Primary | ICD-10-CM

## 2020-08-05 ENCOUNTER — HOSPITAL ENCOUNTER (OUTPATIENT)
Dept: SLEEP CENTER | Age: 60
Discharge: HOME | End: 2020-08-05
Payer: MEDICARE

## 2020-08-05 ENCOUNTER — HOSPITAL ENCOUNTER (OUTPATIENT)
Dept: LAB | Age: 60
Setting detail: SPECIMEN
Discharge: HOME | End: 2020-08-05
Payer: MEDICARE

## 2020-08-05 PROCEDURE — U0003 INFECTIOUS AGENT DETECTION BY NUCLEIC ACID (DNA OR RNA); SEVERE ACUTE RESPIRATORY SYNDROME CORONAVIRUS 2 (SARS-COV-2) (CORONAVIRUS DISEASE [COVID-19]), AMPLIFIED PROBE TECHNIQUE, MAKING USE OF HIGH THROUGHPUT TECHNOLOGIES AS DESCRIBED BY CMS-2020-01-R: HCPCS

## 2020-08-05 PROCEDURE — 95810 POLYSOM 6/> YRS 4/> PARAM: CPT

## 2020-08-07 LAB
SARS-COV-2, NAA: NOT DETECTED
STATUS: NORMAL

## 2020-08-08 ENCOUNTER — TELEPHONE (OUTPATIENT)
Dept: PRIMARY CARE CLINIC | Age: 60
End: 2020-08-08

## 2020-08-10 ENCOUNTER — HOSPITAL ENCOUNTER (OUTPATIENT)
Dept: GENERAL RADIOLOGY | Age: 60
Discharge: HOME | End: 2020-08-12
Payer: MEDICARE

## 2020-08-10 PROCEDURE — A4641 RADIOPHARM DX AGENT NOC: HCPCS | Performed by: SURGERY

## 2020-08-10 PROCEDURE — 74230 X-RAY XM SWLNG FUNCJ C+: CPT

## 2020-08-10 PROCEDURE — 6360000004 HC RX CONTRAST MEDICATION: Performed by: SURGERY

## 2020-08-10 PROCEDURE — 92611 MOTION FLUOROSCOPY/SWALLOW: CPT

## 2020-08-10 RX ADMIN — BARIUM SULFATE 355 ML: 0.6 SUSPENSION ORAL at 13:31

## 2020-08-10 ASSESSMENT — PAIN - FUNCTIONAL ASSESSMENT: PAIN_FUNCTIONAL_ASSESSMENT: 0-10

## 2020-08-10 ASSESSMENT — PAIN SCALES - GENERAL: PAINLEVEL_OUTOF10: 0

## 2020-08-10 NOTE — PROCEDURES
INSTRUMENTAL SWALLOW REPORT  MODIFIED BARIUM SWALLOW    NAME: Martha Castro   : 1960  MRN: [id number]       Date of Eval: 8/10/2020     Ordering Physician: Sharlyn Opitz, MD        Referring Diagnosis(es): Referring Diagnosis: r13.10, R05    Past Medical History:  has a past medical history of Anxiety, Broken bones, Depression, Gallbladder & bile duct stone with obstruction, Hyperlipidemia, Hypertension, Numbness and tingling, and Stroke (cerebrum) ([facility] [address] Past Surgical History:  has a past surgical history that includes Tonsillectomy and adenoidectomy; Cholecystectomy; Abdomen surgery; Hysterectomy; hernia repair (); pr colon ca scrn not hi rsk ind (N/A, 2018); Colonoscopy; Endoscopy, colon, diagnostic; Upper gastrointestinal endoscopy (2020); and Upper gastrointestinal endoscopy (N/A, 2020). Current Diet Solid Consistency: Regular  Current Diet Liquid Consistency: Thin       Type of Study: Initial MBS       Recent CXR/CT of Chest: Date No recent chest x-ray on file. Patient Complaints/Reason for Referral:  Martha Castro was referred for a MBS to assess the efficiency of his/her swallow function, assess for aspiration, and to make recommendations regarding safe dietary consistencies, effective compensatory strategies, and safe eating environment. Patient complaints: Patient reports that she frequently has difficulty swallowing. She reports she gets \"coughing fits\" frequently \"to the point of choking\" and the will sometimes vomit after that. She reports that these episodes get so strong that she shakes and passes out. The most recent occurance of \"coughing fits\" occured yesterday 20 when drinking water through the straw    Behavior/Cognition/Vision/Hearing:  Behavior/Cognition: Alert; Cooperative  Vision: Within Functional Limits  Hearing: Within functional limits    Impressions  Treatment Dx and ICD 10: r13.12   Patient Position: Lateral and Patient Degrees: 90      Consistencies Administered: Reg solid; Dysphagia Soft and Bite-Sized (Dysphagia III); Dysphagia Pureed (Dysphagia I); Thin cup; Thin straw    Compensatory Swallowing Strategies Attempted: Alternate solids and liquids;Eat/Feed slowly;Upright as possible for all oral intake  Postural Changes and/or Swallow Maneuvers Trialed: Chin tuck(Chin tuck was not beneficial)  Dysphagia Outcome Severity Scale: Level 5: Mild dysphagia- Distant supervision. May need one diet consistency restricted  Penetration-Aspiration Scale (PAS): 2 - Material enters the airway, remains above the vocal folds, and is ejected from the airway    Recommended Diet:  Solid consistency: Regular  Liquid consistency: Thin  Liquid administration via: Cup;Straw         Safe Swallow Protocol:     Compensatory Swallowing Strategies: Alternate solids and liquids;Eat/Feed slowly; Remain upright for 30-45 minutes after meals;Upright as possible for all oral intake;Small bites/sips;Swallow 2 times per bite/sip              Recommendations/Treatment  Requires SLP Intervention: Yes         Recommended Exercises:    Therapeutic Interventions: Tongue base strengthening;Patient/Family education;Effortful swallow;Whit;Pharyngeal exercises    Referral To: Dysphagia evaluation;GI(Outpatient speech therapy, possible esophagram)    Education: Images and recommendations were reviewed with the patient following this exam.   Patient Education: [address] [place] [address]  educated patient re: results of evaluation, diet recommendations, and plan of care. Patient Education Response: Verbalizes understanding    Prognosis  Prognosis for safe diet advancement: good  Barriers to reach goals: age    Oral Preparation / Oral Phase  Oral Phase: Impaired  Oral Phase - Major Contributing Deficits  Reduced Bolus Control: Thin straw  Premature Bolus Loss to Pharynx: Thin straw  Reduced Tongue Base Retraction: All    Oral Phase: Patient presents with mild oral phase dysphagia at this time.  Patient demonstrated overall WFL strength and ROM of labial and lingual musculature through oral motor examination. Patient demonstrated poor bolus control with thin liquid trials via straw with premature spillage to the valleculae. Patient has dentures, but reports she only wears them when she goes out in public. She does not wear them when eating because she states thay make her gag. Despite edentulous status, patient demonstrated adequate bolus preparation and propulsion. Pharyngeal Phase  Pharyngeal Phase: Impaired  Pharyngeal Phase - Major Contributing Deficits  Delayed Swallow Initiation: Thin straw  Premature Spillage to Valleculae: Thin straw  Reduced Laryngeal Elevation: All  Reduced Tongue Base: All  Shallow Penetration During: Thin straw(with successive sips)  Pharyngeal Residue - Pyriform: Thin straw;Mechanical soft solid;Reg solid  Pharyngeal Residue - Posterior Pharynx: Reg solid  Pharyngeal Wall - Weakness: All    Pharyngeal: Patient presents with a mild pharyngeal dysphagia at this time. Patient demonstrated delayed swallow initiation with premature spillover to the valleculae with thin liquid consistencies. Poor laryngeal elevation resulted in minimal vallecular residues following thin liquids, mechanical soft solids, and regular solid consistencies. Most residues were able to be cleared with a liquid wash. Patient also demonstrate poor base of tongue retraction with some base of tongue residues noted following regular consistency solids. Poor pharyngeal constiction noted as well with some posterior pharyngeal wall residues remaining post-swallow. Esophageal Phase  Esophageal Screen: Impaired  Upper Esophageal Screen- Major Contributing Deficits  Decreased Esophageal Peristalsis: All  Esophageal Backflow Into The Upper Esophagus: Reg solid    Upper Esophageal Screen: Patient's esophageal phase of swallowing requires further assessment.  Patient noted to have esophageal backflow following regular consistency solids. Decreased esophageal peristalsis was also observed with regular solids. Patient observed to have bony protrusions at the level of C5 and C6          Pain   Patient Currently in Pain: Denies  Pain Level: 0      Therapy Time:   Individual   Time In 1300   Time Out 1340   Minutes 40       Patient presents to [facility] Radiology Department for Modified Barium Swallow Study. Patient reports that she frequently has difficulty swallowing. She reports she gets \"coughing fits\" frequently \"to the point of choking\" and the will sometimes vomit after that. She reports that these episodes get so strong that she shakes and passes out. The most recent occurance of \"coughing fits\" occured yesterday 8/9/20 when drinking water through the straw. Patient does have a history of GERD, but reports she doesn't medication as prescribed. Patient observed to have strong cough resulting in a red face prior to PO intake. Patient presents with mild oropharyngeal dysphagia at this time. Oral phase was characterized by overall WFL strength and ROM of labial and lingual musculature through oral motor examination. Patient demonstrated poor bolus control with thin liquid trials via straw with premature spillage to the valleculae. Patient has dentures, but reports she only wears them when she goes out in public. She does not wear them when eating because she states thay make her gag. Despite edentulous status, patient demonstrated adequate bolus preparation and propulsion. Pharyngeal phase was characterized by delayed swallow initiation with premature spillover to the valleculae with thin liquid consistencies. Poor laryngeal elevation resulted in minimal vallecular residues following thin liquids, mechanical soft solids, and regular solid consistencies. Most residues were able to be cleared with a liquid wash.  Patient also demonstrate poor base of tongue retraction with some base of tongue residues noted following regular consistency solids. Poor pharyngeal constiction noted as well with some posterior pharyngeal wall residues remaining post-swallow. Patient demonstrated flash penetration with thin liquids via straw with successive sips. Patient's esophageal phase of swallowing requires further assessment. Patient noted to have esophageal backflow following regular consistency solids. Decreased esophageal peristalsis was also observed with regular solids. Patient observed to have bony protrusions at the level of C5 and C6. Patient also noted to have immediate belching/hicupping following examination. Patient states \"It's just air coming back up. \"    ST recommends continued diet of regular solids and thin liquids at this time. Patient should begin dysphagia therapy to address noted deficits. ST suspects chronic cough is not due to aspiration. Aspiration due to GERD cannot be ruled out; however, not likely due to observed coughing episode prior to PO intake this date.  ST recommends continued follow up with gastroenterologist.     Cristian Clark M.S. [address] [place] [address]  , 8/10/2020, 5:27 PM

## 2020-08-20 ENCOUNTER — OFFICE VISIT (OUTPATIENT)
Dept: PULMONOLOGY | Age: 60
End: 2020-08-20
Payer: MEDICARE

## 2020-08-20 VITALS
OXYGEN SATURATION: 96 % | WEIGHT: 235 LBS | BODY MASS INDEX: 43.24 KG/M2 | HEIGHT: 62 IN | RESPIRATION RATE: 16 BRPM | SYSTOLIC BLOOD PRESSURE: 147 MMHG | TEMPERATURE: 95.8 F | HEART RATE: 65 BPM | DIASTOLIC BLOOD PRESSURE: 90 MMHG

## 2020-08-20 PROCEDURE — 1036F TOBACCO NON-USER: CPT | Performed by: INTERNAL MEDICINE

## 2020-08-20 PROCEDURE — G8427 DOCREV CUR MEDS BY ELIG CLIN: HCPCS | Performed by: INTERNAL MEDICINE

## 2020-08-20 PROCEDURE — 99214 OFFICE O/P EST MOD 30 MIN: CPT | Performed by: INTERNAL MEDICINE

## 2020-08-20 PROCEDURE — 3017F COLORECTAL CA SCREEN DOC REV: CPT | Performed by: INTERNAL MEDICINE

## 2020-08-20 PROCEDURE — G8417 CALC BMI ABV UP PARAM F/U: HCPCS | Performed by: INTERNAL MEDICINE

## 2020-08-20 NOTE — PATIENT INSTRUCTIONS
SURVEY:    You may be receiving a survey from [employer] regarding your visit today. Please complete the survey to enable us to provide the highest quality of care to you and your family. If you cannot score us a very good on any question, please call the office to discuss how we could have made your experience a very good one. Thank you. Please recheck your blood pressure when you go home and make sure you take your prescribed medication if applicable . Please follow up with your PCP or ER if needed. Patient Education        Learning About Coronavirus [phone])  Coronavirus [phone]): Overview  What is coronavirus ([id number])? The coronavirus disease (COVID-19) is caused by a virus. It is an illness that was first found in [place], [place], in December 2019. It has since spread worldwide. The virus can cause fever, cough, and trouble breathing. In severe cases, it can cause pneumonia and make it hard to breathe without help. It can cause death. Coronaviruses are a large group of viruses. They cause the common cold. They also cause more serious illnesses like Middle East respiratory syndrome (MERS) and severe acute respiratory syndrome (SARS). COVID-19 is caused by a novel coronavirus. That means it's a new type that has not been seen in people before. This virus spreads person-to-person through droplets from coughing and sneezing. It can also spread when you are close to someone who is infected. And it can spread when you touch something that has the virus on it, such as a doorknob or a tabletop. What can you do to protect yourself from coronavirus (COVID-19)? The best way to protect yourself from getting sick is to:  · Avoid areas where there is an outbreak. · Avoid contact with people who may be infected. · Wash your hands often with soap or alcohol-based hand sanitizers. · Avoid crowds and try to stay at least 6 feet away from other people.   · Wash your hands often, especially after you cough or sneeze. Use soap and water, and scrub for at least 20 seconds. If soap and water aren't available, use an alcohol-based hand . · Avoid touching your mouth, nose, and eyes. What can you do to avoid spreading the virus to others? To help avoid spreading the virus to others:  · Cover your mouth with a tissue when you cough or sneeze. Then throw the tissue in the trash. · Use a disinfectant to clean things that you touch often. · Wear a cloth face cover if you have to go to public areas. · Stay home if you are sick or have been exposed to the virus. Don't go to school, work, or public areas. And don't use public transportation, ride-shares, or taxis unless you have no choice. · If you are sick:  ? Leave your home only if you need to get medical care. But call the doctor's office first so they know you're coming. And wear a face cover. ? Wear the face cover whenever you're around other people. It can help stop the spread of the virus when you cough or sneeze. ? Clean and disinfect your home every day. Use household  and disinfectant wipes or sprays. Take special care to clean things that you grab with your hands. These include doorknobs, remote controls, phones, and handles on your refrigerator and microwave. And don't forget countertops, tabletops, bathrooms, and computer keyboards. When to call for help  [id number] anytime you think you may need emergency care. For example, call if:  · You have severe trouble breathing. (You can't talk at all.)  · You have constant chest pain or pressure. · You are severely dizzy or lightheaded. · You are confused or can't think clearly. · Your face and lips have a blue color. · You pass out (lose consciousness) or are very hard to wake up. Call your doctor now if you develop symptoms such as:  · Shortness of breath. · Fever. · Cough. If you need to get care, call ahead to the doctor's office for instructions before you go.  Make sure you wear a

## 2020-09-04 ENCOUNTER — HOSPITAL ENCOUNTER (OUTPATIENT)
Dept: LAB | Age: 60
Discharge: HOME | End: 2020-09-04
Payer: MEDICARE

## 2020-09-04 DIAGNOSIS — Z01.818 PREOP TESTING: Primary | ICD-10-CM

## 2020-09-04 PROCEDURE — U0003 INFECTIOUS AGENT DETECTION BY NUCLEIC ACID (DNA OR RNA); SEVERE ACUTE RESPIRATORY SYNDROME CORONAVIRUS 2 (SARS-COV-2) (CORONAVIRUS DISEASE [COVID-19]), AMPLIFIED PROBE TECHNIQUE, MAKING USE OF HIGH THROUGHPUT TECHNOLOGIES AS DESCRIBED BY CMS-2020-01-R: HCPCS

## 2020-09-04 PROCEDURE — C9803 HOPD COVID-19 SPEC COLLECT: HCPCS

## 2020-09-05 LAB — SARS-COV-2, NAA: NOT DETECTED

## 2020-09-06 ENCOUNTER — TELEPHONE (OUTPATIENT)
Dept: PRIMARY CARE CLINIC | Age: 60
End: 2020-09-06

## 2020-09-09 ENCOUNTER — HOSPITAL ENCOUNTER (OUTPATIENT)
Dept: SLEEP CENTER | Age: 60
Discharge: HOME | End: 2020-09-09
Payer: MEDICARE

## 2020-09-09 PROCEDURE — 95811 POLYSOM 6/>YRS CPAP 4/> PARM: CPT

## 2020-09-14 LAB — STATUS: NORMAL

## 2020-09-21 ENCOUNTER — TELEPHONE (OUTPATIENT)
Dept: PRIMARY CARE CLINIC | Age: 60
End: 2020-09-21

## 2020-09-21 NOTE — TELEPHONE ENCOUNTER
Called patient and informed her it was time for her to have her mammogram and that we would send the order to the hospital and they will call her to schedule. Verbalizes understanding.

## 2020-10-12 ENCOUNTER — OFFICE VISIT (OUTPATIENT)
Dept: PRIMARY CARE CLINIC | Age: 60
End: 2020-10-12
Payer: MEDICARE

## 2020-10-12 VITALS
TEMPERATURE: 97.5 F | WEIGHT: 234 LBS | BODY MASS INDEX: 43.06 KG/M2 | HEIGHT: 62 IN | DIASTOLIC BLOOD PRESSURE: 89 MMHG | OXYGEN SATURATION: 98 % | HEART RATE: 75 BPM | RESPIRATION RATE: 18 BRPM | SYSTOLIC BLOOD PRESSURE: 125 MMHG

## 2020-10-12 PROCEDURE — 3017F COLORECTAL CA SCREEN DOC REV: CPT | Performed by: NURSE PRACTITIONER

## 2020-10-12 PROCEDURE — G8417 CALC BMI ABV UP PARAM F/U: HCPCS | Performed by: NURSE PRACTITIONER

## 2020-10-12 PROCEDURE — G8926 SPIRO NO PERF OR DOC: HCPCS | Performed by: NURSE PRACTITIONER

## 2020-10-12 PROCEDURE — 3023F SPIROM DOC REV: CPT | Performed by: NURSE PRACTITIONER

## 2020-10-12 PROCEDURE — 1036F TOBACCO NON-USER: CPT | Performed by: NURSE PRACTITIONER

## 2020-10-12 PROCEDURE — G8427 DOCREV CUR MEDS BY ELIG CLIN: HCPCS | Performed by: NURSE PRACTITIONER

## 2020-10-12 PROCEDURE — 99213 OFFICE O/P EST LOW 20 MIN: CPT | Performed by: NURSE PRACTITIONER

## 2020-10-12 PROCEDURE — G8484 FLU IMMUNIZE NO ADMIN: HCPCS | Performed by: NURSE PRACTITIONER

## 2020-10-12 RX ORDER — DEXTROMETHORPHAN HYDROBROMIDE, GUAIFENESIN AND PSEUDOEPHEDRINE HYDROCHLORIDE 15; 400; 60 MG/1; MG/1; MG/1
1 TABLET ORAL EVERY 6 HOURS PRN
Qty: 28 TABLET | Refills: 0 | Status: SHIPPED | OUTPATIENT
Start: 2020-10-12 | End: 2020-10-19

## 2020-10-12 RX ORDER — AZITHROMYCIN 250 MG/1
250 TABLET, FILM COATED ORAL SEE ADMIN INSTRUCTIONS
Qty: 6 TABLET | Refills: 0 | Status: SHIPPED | OUTPATIENT
Start: 2020-10-12 | End: 2020-10-17

## 2020-10-12 ASSESSMENT — ENCOUNTER SYMPTOMS
COUGH: 1
EYES NEGATIVE: 1
WHEEZING: 1
RHINORRHEA: 1
ALLERGIC/IMMUNOLOGIC NEGATIVE: 1
GASTROINTESTINAL NEGATIVE: 1

## 2020-10-12 NOTE — PROGRESS NOTES
[facility] WALK-IN CARE  [address] [facility] [address] [facility]  Dept: [phone]  Dept Fax: [phone]    Nida Ernandez is a 63 y.o. female who presents to the [facility] in Care today for her medical conditions/complaints as noted below. Nida Ernandez is c/o of Congestion (x 3 days. c/o cough, congestion, sore throat. denies fever )      HPI:    Nida Ernandez is a 63 y.o. female who presents with  Congestion and cough. Friday started with a scratchy throat and then got worse. Cough is worse today and is wheezing. Her ribs and throat hurt when she coughs. Productive cough and rhinorrhea. No fever. Denies nausea or vomiting. States cough gets so bad she feels like she is going to choke. She has taken some Tessalon Perrles and night time medication. States no results. Has not used her Albuterol or Spiriva.           Past Medical History:   Diagnosis Date    Anxiety 2012    Broken bones 1985    Depression 2012    Gallbladder & bile duct stone with obstruction 2000    Hyperlipidemia 2012    Hypertension 2012    Numbness and tingling     generalized    Stroke (cerebrum) ([facility]) 2018        Current Outpatient Medications   Medication Sig Dispense Refill    Pseudoephedrine-DM-GG (CAPMIST DM) 60- MG TABS Take 1 tablet by mouth every 6 hours as needed (Sinus pressure) 28 tablet 0    azithromycin (ZITHROMAX) 250 MG tablet Take 1 tablet by mouth See Admin Instructions for 5 days 500mg on day 1 followed by 250mg on days 2 - 5 6 tablet 0    metFORMIN (GLUCOPHAGE) 500 MG tablet TAKE ONE TABLET BY MOUTH TWICE A DAY WITH MEALS 180 tablet 3    atorvastatin (LIPITOR) 40 MG tablet TAKE ONE TABLET BY MOUTH ONCE NIGHTLY 90 tablet 3    budesonide-formoterol (SYMBICORT) 160-4.5 MCG/ACT AERO Inhale 2 puffs into the lungs 2 times daily 1 Inhaler 3    omeprazole (PRILOSEC) 20 MG delayed release capsule TAKE ONE CAPSULE BY MOUTH DAILY 90 capsule 3    dilTIAZem (TIAZAC) 180 MG extended release capsule TAKE ONE CAPSULE BY MOUTH DAILY 90 capsule 1    sucralfate (CARAFATE) 1 GM tablet Take 1 tablet by mouth 4 times daily Crush/dissolve and create a slurry 120 tablet 3    hyoscyamine (LEVSIN/SL) 125 MCG sublingual tablet Place 1 tablet under the tongue every 4 hours as needed for Cramping 90 tablet 0    atenolol (TENORMIN) 25 MG tablet TAKE ONE TABLET BY MOUTH DAILY 90 tablet 3    tiotropium (SPIRIVA HANDIHALER) 18 MCG inhalation capsule Inhale 1 capsule into the lungs daily 90 capsule 3    albuterol sulfate  (90 Base) MCG/ACT inhaler INHALE TWO PUFFS BY MOUTH FOUR TIMES A DAY AS NEEDED FOR WHEEZING 18 g 0    Respiratory Therapy Supplies (NEBULIZER/TUBING/MOUTHPIECE) KIT 1 kit by Does not apply route daily as needed (wheezing, sob) 1 kit 0    albuterol (PROVENTIL) (2.5 MG/3ML) 0.083% nebulizer solution Take 3 mLs by nebulization every 4 hours as needed for Wheezing or Shortness of Breath 120 each 3    sertraline (ZOLOFT) 50 MG tablet Take 1 tablet by mouth daily (Patient taking differently: Take 25 mg by mouth daily ) 90 tablet 3    buPROPion (WELLBUTRIN XL) 150 MG extended release tablet Take 1 tablet by mouth every morning 90 tablet 3    aspirin 81 MG chewable tablet Take 81 mg by mouth daily       No current facility-administered medications for this visit. No Known Allergies    Subjective:      Review of Systems   Constitutional: Positive for fatigue. Negative for fever. HENT: Positive for congestion and rhinorrhea. Eyes: Negative. Respiratory: Positive for cough and wheezing. Cardiovascular: Negative. Gastrointestinal: Negative. Endocrine: Negative. Genitourinary: Negative. Musculoskeletal: Negative. Skin: Negative. Allergic/Immunologic: Negative. Neurological: Negative. Hematological: Negative. Psychiatric/Behavioral: Negative. Objective:     Physical Exam  Vitals signs and nursing note reviewed. Constitutional:       Appearance: Normal appearance. HENT:      Head: Normocephalic. Right Ear: External ear normal.      Left Ear: External ear normal.      Nose: Rhinorrhea present. Rhinorrhea is clear. Mouth/Throat:      Lips: Pink. Mouth: Mucous membranes are moist.      Pharynx: Posterior oropharyngeal erythema present. Eyes:      Pupils: Pupils are equal, round, and reactive to light. Neck:      Musculoskeletal: Normal range of motion. Cardiovascular:      Rate and Rhythm: Normal rate and regular rhythm. Heart sounds: Normal heart sounds. Pulmonary:      Breath sounds: Normal air entry. Examination of the right-lower field reveals wheezing. Wheezing present. Comments: Harsh cough with deep inspiration  Musculoskeletal: Normal range of motion. Skin:     General: Skin is warm. Capillary Refill: Capillary refill takes less than 2 seconds. Neurological:      Mental Status: She is alert and oriented to person, place, and time. Psychiatric:         Mood and Affect: Mood normal.       /89   Pulse 75   Temp 97.5 °F (36.4 °C) (Temporal)   Resp 18   Ht 5' 2\" (1.575 m)   Wt 234 lb (106.1 kg)   SpO2 98%   BMI 42.80 kg/m²     Assessment:      Diagnosis Orders   1. COPD with acute bronchitis ([address]  azithromycin (ZITHROMAX) 250 MG tablet     No results found for this visit on 10/12/20. Plan:   Reviewed exam findings and plan of care as well as supportive management as listed below with patient at bedside. · Start Zithromax, Capmist, and use albuterol MDI as prescribed. Encouraged to take antibiotic (if prescribed) with probiotic and food to decrease GI side effects. Reviewed administration and side effects. · Supportive management encouraged including Tylenol over-the-counter as instructed on packaging for fever and discomfort. Cool mist humidifiers. Hot tea with honey and lemon for cough as needed.   · Instructions pertinent to diagnoses and treatment plan given with AVS.  · Encouraged to follow up to ER for any difficulty breathing, shortness of breath or inability to swallow including hives and fevers above 103°. · Follow-up at this office or PCP if no improvement. Discussed proper use of inhalers and encouraged patient to use them as prescribed. No follow-ups on file.     Orders Placed This Encounter   Medications    Pseudoephedrine-DM-GG (CAPMIST DM) 60- MG TABS     Sig: Take 1 tablet by mouth every 6 hours as needed (Sinus pressure)     Dispense:  28 tablet     Refill:  0    azithromycin (ZITHROMAX) 250 MG tablet     Sig: Take 1 tablet by mouth See Admin Instructions for 5 days 500mg on day 1 followed by 250mg on days 2 - 5     Dispense:  6 tablet     Refill:  0        Electronically signed by OLIVER Baird CNP on 10/12/2020 at 2:12 PM

## 2020-10-21 RX ORDER — BUPROPION HYDROCHLORIDE 300 MG/1
TABLET ORAL
COMMUNITY
Start: 2020-09-13 | End: 2020-12-02

## 2020-10-21 RX ORDER — BUPROPION HYDROCHLORIDE 150 MG/1
TABLET ORAL
Qty: 90 TABLET | Refills: 3 | Status: SHIPPED | OUTPATIENT
Start: 2020-10-21 | End: 2022-01-04 | Stop reason: SDUPTHER

## 2020-10-21 NOTE — TELEPHONE ENCOUNTER
[facility]            Patient Active Problem List:     Essential hypertension     Moderate episode of recurrent major depressive disorder (HCC)     Gastroesophageal reflux disease     Neurological symptoms     Acute diverticulitis     Anxiety     Depression     Hypertension     Hyperlipidemia     Morbidly obese (HCC)     Former smoker     Chronic obstructive pulmonary disease ([facility] [address]     Dysphagia

## 2020-10-23 ENCOUNTER — TELEPHONE (OUTPATIENT)
Dept: PULMONOLOGY | Age: 60
End: 2020-10-23

## 2020-11-03 PROBLEM — I10 ESSENTIAL HYPERTENSION: Status: RESOLVED | Noted: 2018-09-05 | Resolved: 2020-11-03

## 2020-11-04 ENCOUNTER — HOSPITAL ENCOUNTER (OUTPATIENT)
Dept: WOMENS IMAGING | Age: 60
Discharge: HOME | End: 2020-11-06
Payer: MEDICARE

## 2020-11-04 ENCOUNTER — TELEPHONE (OUTPATIENT)
Dept: PRIMARY CARE CLINIC | Age: 60
End: 2020-11-04

## 2020-11-04 PROCEDURE — 77063 BREAST TOMOSYNTHESIS BI: CPT

## 2020-11-09 ENCOUNTER — HOSPITAL ENCOUNTER (OUTPATIENT)
Dept: WOMENS IMAGING | Age: 60
Discharge: HOME | End: 2020-11-11
Payer: MEDICARE

## 2020-11-09 ENCOUNTER — HOSPITAL ENCOUNTER (OUTPATIENT)
Dept: ULTRASOUND IMAGING | Age: 60
Discharge: HOME | End: 2020-11-11
Payer: MEDICARE

## 2020-11-09 PROCEDURE — 76641 ULTRASOUND BREAST COMPLETE: CPT

## 2020-11-09 PROCEDURE — G0279 TOMOSYNTHESIS, MAMMO: HCPCS

## 2020-11-10 ENCOUNTER — TELEPHONE (OUTPATIENT)
Dept: PRIMARY CARE CLINIC | Age: 60
End: 2020-11-10

## 2020-11-10 NOTE — TELEPHONE ENCOUNTER
----- Message from OLIVER Pastrana CNP sent at 11/9/2020  4:50 PM EST -----  Results are normal, please call patient and make them aware.

## 2020-11-10 NOTE — TELEPHONE ENCOUNTER
----- Message from [address] [place] [address], OLIVER - CNP sent at 11/9/2020  4:50 PM EST -----  Results are normal, please call patient and make them aware.

## 2020-12-01 ENCOUNTER — OFFICE VISIT (OUTPATIENT)
Dept: OBGYN | Age: 60
End: 2020-12-01
Payer: MEDICARE

## 2020-12-01 ENCOUNTER — TELEPHONE (OUTPATIENT)
Dept: OBGYN | Age: 60
End: 2020-12-01

## 2020-12-01 ENCOUNTER — HOSPITAL ENCOUNTER (OUTPATIENT)
Age: 60
Setting detail: SPECIMEN
Discharge: HOME | End: 2020-12-01
Payer: MEDICARE

## 2020-12-01 VITALS
HEIGHT: 62 IN | DIASTOLIC BLOOD PRESSURE: 88 MMHG | SYSTOLIC BLOOD PRESSURE: 136 MMHG | BODY MASS INDEX: 41.41 KG/M2 | WEIGHT: 225 LBS

## 2020-12-01 PROCEDURE — 99386 PREV VISIT NEW AGE 40-64: CPT | Performed by: ADVANCED PRACTICE MIDWIFE

## 2020-12-01 PROCEDURE — G0145 SCR C/V CYTO,THINLAYER,RESCR: HCPCS

## 2020-12-01 PROCEDURE — G8484 FLU IMMUNIZE NO ADMIN: HCPCS | Performed by: ADVANCED PRACTICE MIDWIFE

## 2020-12-01 RX ORDER — DILTIAZEM HYDROCHLORIDE 180 MG/1
CAPSULE, EXTENDED RELEASE ORAL
Qty: 90 CAPSULE | Refills: 1 | Status: SHIPPED | OUTPATIENT
Start: 2020-12-01 | End: 2021-06-29

## 2020-12-01 ASSESSMENT — PATIENT HEALTH QUESTIONNAIRE - PHQ9
SUM OF ALL RESPONSES TO PHQ QUESTIONS 1-9: 2
1. LITTLE INTEREST OR PLEASURE IN DOING THINGS: 1
SUM OF ALL RESPONSES TO PHQ QUESTIONS 1-9: 2
SUM OF ALL RESPONSES TO PHQ QUESTIONS 1-9: 2
2. FEELING DOWN, DEPRESSED OR HOPELESS: 1
SUM OF ALL RESPONSES TO PHQ9 QUESTIONS 1 & 2: 2

## 2020-12-01 NOTE — TELEPHONE ENCOUNTER
See my telephone note to sindi re: inability to rx adipex.   Please inform patient and plan accordingly

## 2020-12-01 NOTE — TELEPHONE ENCOUNTER
As the patient has several risk factors including history of stroke, hypertension and anxiety and depression with medication interactions possible to include serotonin syndrome, I am not comfortable prescribing adipex and if she continues to desire adipex therapy we could consult with Dr. Collette Benz he may be willing to see her for this.

## 2020-12-01 NOTE — PROGRESS NOTES
YEARLY PHYSICAL    Date of service: 2020    Melissa Grover  Is a 63 y.o.   female    PT's PCP is: OLIVER Miller - JONATHAN     : 1960                                             Subjective:       No LMP recorded. Patient has had a hysterectomy. Are your menses regular: not applicable    OB History    Para Term  AB Living   6       3 3   SAB TAB Ectopic Molar Multiple Live Births   2 1              # Outcome Date GA Lbr Craig/2nd Weight Sex Delivery Anes PTL Lv   6             5             4             3 TAB            2 SAB            1 SAB                 Social History     Tobacco Use   Smoking Status Former Smoker    Packs/day: 0.75    Years: 40.00    Pack years: 30.00    Last attempt to quit: 10/8/2019    Years since quittin.1   Smokeless Tobacco Never Used   Tobacco Comment    4 cigarettes daily currently        Social History     Substance and Sexual Activity   Alcohol Use No    Comment: Former quit 2018       Family History   Problem Relation Age of Onset    Asthma Mother     Cancer Mother         Skin    Depression Mother     Hearing Loss Mother     High Cholesterol Mother     High Blood Pressure Mother     Osteoporosis Mother     Stroke Mother        Any family history of breast or ovarian cancer: No    Any family history of blood clots: Yes      Allergies: Patient has no known allergies.       Current Outpatient Medications:     buPROPion (WELLBUTRIN XL) 150 MG extended release tablet, TAKE ONE TABLET BY MOUTH EVERY MORNING, Disp: 90 tablet, Rfl: 3    metFORMIN (GLUCOPHAGE) 500 MG tablet, TAKE ONE TABLET BY MOUTH TWICE A DAY WITH MEALS, Disp: 180 tablet, Rfl: 3    atorvastatin (LIPITOR) 40 MG tablet, TAKE ONE TABLET BY MOUTH ONCE NIGHTLY, Disp: 90 tablet, Rfl: 3    budesonide-formoterol (SYMBICORT) 160-4.5 MCG/ACT AERO, Inhale 2 puffs into the lungs 2 times daily, Disp: 1 Inhaler, Rfl: 3    omeprazole (PRILOSEC) 20 MG delayed release capsule, TAKE ONE CAPSULE BY MOUTH DAILY, Disp: 90 capsule, Rfl: 3    dilTIAZem (TIAZAC) 180 MG extended release capsule, TAKE ONE CAPSULE BY MOUTH DAILY, Disp: 90 capsule, Rfl: 1    atenolol (TENORMIN) 25 MG tablet, TAKE ONE TABLET BY MOUTH DAILY, Disp: 90 tablet, Rfl: 3    tiotropium (SPIRIVA HANDIHALER) 18 MCG inhalation capsule, Inhale 1 capsule into the lungs daily, Disp: 90 capsule, Rfl: 3    albuterol sulfate  (90 Base) MCG/ACT inhaler, INHALE TWO PUFFS BY MOUTH FOUR TIMES A DAY AS NEEDED FOR WHEEZING, Disp: 18 g, Rfl: 0    Respiratory Therapy Supplies (NEBULIZER/TUBING/MOUTHPIECE) KIT, 1 kit by Does not apply route daily as needed (wheezing, sob), Disp: 1 kit, Rfl: 0    albuterol (PROVENTIL) (2.5 MG/3ML) 0.083% nebulizer solution, Take 3 mLs by nebulization every 4 hours as needed for Wheezing or Shortness of Breath, Disp: 120 each, Rfl: 3    sertraline (ZOLOFT) 50 MG tablet, Take 1 tablet by mouth daily (Patient taking differently: Take 25 mg by mouth daily ), Disp: 90 tablet, Rfl: 3    aspirin 81 MG chewable tablet, Take 81 mg by mouth daily, Disp: , Rfl:     buPROPion (WELLBUTRIN XL) 300 MG extended release tablet, , Disp: , Rfl:     sucralfate (CARAFATE) 1 GM tablet, Take 1 tablet by mouth 4 times daily Crush/dissolve and create a slurry (Patient not taking: Reported on 12/1/2020), Disp: 120 tablet, Rfl: 3    hyoscyamine (LEVSIN/SL) 125 MCG sublingual tablet, Place 1 tablet under the tongue every 4 hours as needed for Cramping (Patient not taking: Reported on 12/1/2020), Disp: 90 tablet, Rfl: 0    Social History     Substance and Sexual Activity   Sexual Activity Not Currently       Any bleeding or pain with intercourse:  na    Last Yearly:  2010 ? Last pap: 2010 ? Last HPV: 2010 ?     Last Mammogram: 11/2020    Last Dexascan na    Last colorectal screen- type:colonoscopy*  date  09/2020    Do you do self breast exams: No    Past Medical History:   Diagnosis Date    Anxiety 2012    Broken bones 1985    Depression 2012    Gallbladder & bile duct stone with obstruction 2000    Hyperlipidemia 2012    Hypertension 2012    Numbness and tingling     generalized    Stroke (cerebrum) ([address] 2018       Past Surgical History:   Procedure Laterality Date    ABDOMEN SURGERY      repair of bile duct.  CHOLECYSTECTOMY      COLONOSCOPY      ENDOSCOPY, COLON, DIAGNOSTIC      HERNIA REPAIR  2015    HYSTERECTOMY      uterine prolapse, ovaries remain.  GA COLON CA SCRN NOT HI RSK IND N/A 9/25/2018    -normal    TONSILLECTOMY AND ADENOIDECTOMY      UPPER GASTROINTESTINAL ENDOSCOPY  07/09/2020    UPPER GASTROINTESTINAL ENDOSCOPY N/A 7/9/2020    EGD BIOPSY performed by Reynaldo [facility]yer [facility] at [facility] History   Problem Relation Age of Onset    Asthma Mother     Cancer Mother         Skin    Depression Mother     Hearing Loss Mother     High Cholesterol Mother     High Blood Pressure Mother     Osteoporosis Mother     Stroke Mother        Chief Complaint   Patient presents with    Gynecologic Exam     Yearly exam. Last pap 10 years ago ? Patient concerned about weight. PE:  Vital Signs  Blood pressure 136/88, height 5' 2\" (1.575 m), weight 225 lb (102.1 kg), not currently breastfeeding. Estimated body mass index is 41.15 kg/m² as calculated from the following:    Height as of this encounter: 5' 2\" (1.575 m). Weight as of this encounter: 225 lb (102.1 kg). Labs:    No results found for this visit on 12/01/20. NURSE: Storm GARCIA    HPI: here for annual gyn exam, but also reports desires weight management program with adipex as she had previously been rx in [place]. PCP said this would need to be \"from a woman's office\"    Review of Systems   Constitutional: Negative. HENT: Negative for congestion. Respiratory: Negative for shortness of breath. Cardiovascular: Negative for chest pain. Gastrointestinal: Negative for abdominal pain. Genitourinary: Negative for dysuria. Musculoskeletal: Negative for back pain. Skin: Negative for rash. Neurological: Negative for headaches. Psychiatric/Behavioral: The patient is not nervous/anxious. Objective  Lymphatic:   no lymphadenopathy  Heent:   negative   Cor: regular rate and rhythm, no murmurs              Pul:clear to auscultation bilaterally- no wheezes, rales or rhonchi, normal air movement, no respiratory distress      GI: Abdomen soft, non-tender. BS normal. No masses,  No organomegaly           Extremities: normal strength, tone, and muscle mass   Breasts: Breast:normal appearance, no masses or tenderness   Pelvic Exam: GENITAL/URINARY:  External Genitalia:  General appearance; normal, Hair distribution; normal, Lesions absent  Urethral Meatus:  Size normal, Location normal, Lesions absent, Prolapse absent  Urethra: Fullness absent, Masses absent  Bladder:  Fullness absent, Masses absent, Tenderness absent, Cystocele absent  Vagina:  General appearance normal, Estrogen effect normal, Discharge absent, Lesions absent, Pelvic support normal  Cervix:  General appearance normal, Lesions absent, Discharge absent, Tenderness absent, Enlargement absent, Nodularity absent  Uterus:  Size normal, Tenderness absent  Adenexa: Masses absent, Tenderness absent  Anus/Perineum:  Lesions absent and Masses absent                                                             Assessment and Plan          Diagnosis Orders   1. Morbid obesity with BMI of 40.0-44.9, adult ([facility] [address]     2. Encounter for annual routine gynecological examination     3. Screening for malignant neoplasm of vagina after partial hysterectomy  PAP SMEAR       after consult with Dr. Ashli Meade, patient's history of stroke, hypertension and well butrin use patient is not a good candidate for adipex therapy.   Dr. Ashli Meade suggested [facility] use and will be given to patient as an option      I am having Renata MCCLELLAND Lew maintain her aspirin, sertraline, Nebulizer/Tubing/Mouthpiece, albuterol, albuterol sulfate HFA, Spiriva HandiHaler, atenolol, omeprazole, budesonide-formoterol, metFORMIN, atorvastatin, and buPROPion. Return in about 1 year (around 12/1/2021) for yearly. She was also counseled on her preventative health maintenance recommendations and follow-up. There are no Patient Instructions on file for this visit.     Rai Healy,12/2/2020 7:35 PM

## 2020-12-02 ENCOUNTER — TELEPHONE (OUTPATIENT)
Dept: OBGYN | Age: 60
End: 2020-12-02

## 2020-12-02 ASSESSMENT — ENCOUNTER SYMPTOMS
BACK PAIN: 0
SHORTNESS OF BREATH: 0
ABDOMINAL PAIN: 0

## 2020-12-02 NOTE — PATIENT INSTRUCTIONS
SURVEY:    You may be receiving a survey from [employer] regarding your visit today. Please complete the survey to enable us to provide the highest quality of care to you and your family. If you cannot score us a very good on any question, please call the office to discuss how we could have made your experience a very good one. Thank you. Patient Education        Learning About Coronavirus [phone])  Coronavirus [phone]): Overview  What is coronavirus ([id number])? The coronavirus disease (COVID-19) is caused by a virus. It is an illness that was first found in December 2019. It has since spread worldwide. The virus can cause fever, cough, and trouble breathing. In severe cases, it can cause pneumonia and make it hard to breathe without help. It can cause death. This virus spreads person-to-person through droplets from coughing and sneezing. It can also spread when you are close to someone who is infected. And it can spread when you touch something that has the virus on it, such as a doorknob or a tabletop. Coronaviruses are a large group of viruses. They cause the common cold. They also cause more serious illnesses like [place] respiratory syndrome (MERS) and severe acute respiratory syndrome (SARS). COVID-19 is caused by a novel coronavirus. That means it's a new type that has not been seen in people before. How is COVID-19 treated? Mild illness can be treated at home, but more serious illness needs to be treated in the hospital. Treatment may include medicines to reduce symptoms, plus breathing support such as oxygen therapy or a ventilator. Other treatments, such as antiviral medicines, may help people who have COVID-19. What can you do to protect yourself from COVID-19? The best way to protect yourself from getting sick is to:  · Avoid areas where there is an outbreak. · Avoid contact with people who may be infected.   · Avoid crowds and try to stay at least 6 feet away from other people. · Wash your hands often, especially after you cough or sneeze. Use soap and water, and scrub for at least 20 seconds. If soap and water aren't available, use an alcohol-based hand . · Avoid touching your mouth, nose, and eyes. What can you do to avoid spreading the virus to others? To help avoid spreading the virus to others:  · Wash your hands often with soap or alcohol-based hand sanitizers. · Cover your mouth with a tissue when you cough or sneeze. Then throw the tissue in the trash. · Use a disinfectant to clean things that you touch often. These include doorknobs, remote controls, phones, and handles on your refrigerator and microwave. And don't forget countertops, tabletops, bathrooms, and computer keyboards. · Wear a cloth face cover if you have to go to public areas. If you know or suspect that you have COVID-19:  · Stay home. Don't go to school, work, or public areas. And don't use public transportation, ride-shares, or taxis unless you have no choice. · Leave your home only if you need to get medical care or testing. But call the doctor's office first so they know you're coming. And wear a face cover. · Limit contact with people in your home. If possible, stay in a separate bedroom and use a separate bathroom. · Wear a face cover whenever you're around other people. It can help stop the spread of the virus when you cough or sneeze. · Clean and disinfect your home every day. Use household  and disinfectant wipes or sprays. Take special care to clean things that you grab with your hands. · Self-isolate until it's safe to be around others again. ? If you have symptoms, it's safe when you haven't had a fever for 3 days and your symptoms have improved and it's been at least 10 days since your symptoms started. ? If you were exposed to the virus but don't have symptoms, it's safe to be around others 14 days after exposure.   ? Talk to your doctor about whether you also need testing, especially if you have a weakened immune system. When to call for help  Call 911 anytime you think you may need emergency care. For example, call if:  · You have severe trouble breathing. (You can't talk at all.)  · You have constant chest pain or pressure. · You are severely dizzy or lightheaded. · You are confused or can't think clearly. · Your face and lips have a blue color. · You passed out (lost consciousness) or are very hard to wake up. Call your doctor now if you develop symptoms such as:  · Shortness of breath. · Fever. · Cough. If you need to get care, call ahead to the doctor's office for instructions before you go. Make sure you wear a face cover to prevent exposing other people to the virus. Where can you get the latest information? The following health organizations are tracking and studying this virus. Their websites contain the most up-to-date information. Georgina Zuniga also learn what to do if you think you may have been exposed to the virus. · U.S. Centers for Disease Control and Prevention (CDC): The CDC provides updated news about the disease and travel advice. The website also tells you how to prevent the spread of infection. www.cdc.gov  · World Health Organization [facility]): WHO offers information about the virus outbreaks. WHO also has travel advice. www.who.int  Current as of: July 10, 2020               Content Version: 12.6  © 9123-4278 [employer]. Care instructions adapted under license by [facility] ([facility]). If you have questions about a medical condition or this instruction, always ask your healthcare professional. [address] any warranty or liability for your use of this information.

## 2020-12-02 NOTE — TELEPHONE ENCOUNTER
Advised patient that you will not prescribe the Adipex. Patient wondering about \"weight loss doctor\" of if Dr. Marisol Poe would prescribe.

## 2020-12-03 ENCOUNTER — VIRTUAL VISIT (OUTPATIENT)
Dept: PULMONOLOGY | Age: 60
End: 2020-12-03
Payer: MEDICARE

## 2020-12-03 PROCEDURE — G8417 CALC BMI ABV UP PARAM F/U: HCPCS | Performed by: INTERNAL MEDICINE

## 2020-12-03 PROCEDURE — G8427 DOCREV CUR MEDS BY ELIG CLIN: HCPCS | Performed by: INTERNAL MEDICINE

## 2020-12-03 PROCEDURE — 1036F TOBACCO NON-USER: CPT | Performed by: INTERNAL MEDICINE

## 2020-12-03 PROCEDURE — G8484 FLU IMMUNIZE NO ADMIN: HCPCS | Performed by: INTERNAL MEDICINE

## 2020-12-03 PROCEDURE — 3017F COLORECTAL CA SCREEN DOC REV: CPT | Performed by: INTERNAL MEDICINE

## 2020-12-03 PROCEDURE — 99214 OFFICE O/P EST MOD 30 MIN: CPT | Performed by: INTERNAL MEDICINE

## 2020-12-03 NOTE — PROGRESS NOTES
OUTPATIENT PULMONARY PROGRESS NOTE    Telehealth visit. Telephone visit. Patient:  Chu Pereira  MRN: [id number]    Consulting Physician: [address] [place] [address], APRN - CNP  Reason for Consult: Chronic cough  Primacy Care Physician: OLIVER Baker CNP    HISTORY OF PRESENT ILLNESS:   The patient is a 63 y.o. female   She is here for follow-up of chronic cough/chronic bronchitis/asthmatic bronchitis/history of smoking more than 30 years. When she was seen last time she already had a sleep study done by that time which showed obstructive sleep apnea of mild severity. She had a titration study done on 09/09/2020 when she was titrated to a CPAP of 12 cm. She started using CPAP (CPAP was delayed because of pandemic) on November 9. Since she started using CPAP she thinks that she is doing better her sleep quality is much better when she wake up in the morning sleep is more restorative during the daytime she feels more energetic she does not take nap anymore during the daytime she does not fall asleep does not take nap and does not doze off during the daytime. For the last 2 weeks she claimed that she developed some acute sinus post nasal drip and congestion. She denies fever she denies flulike symptoms she denies headache body ache and myalgias and no GI symptoms. For the last 2 weeks she has sputum production with postnasal drip which is whitish to yellowish in color and has been improving. Otherwise her cough is about the same until 2 weeks ago, she does not have daily or persistent cough she does get cough off and on. She does have wheezing off and on denies any daily or persistent wheezing. She does not usually wake up at night with cough wheezing chest tightness or shortness of breath.   She does have dyspnea on exertion she will get short of breath on doing exertional activity and when she has to go stairs up or down otherwise she is able to do her regular activities most of the time without getting shortness of breath. She denies any fever chills chest pain weight loss or weight gain or night sweats. She is using Symbicort 2 puff twice daily and she is using Spiriva once daily. She usually require albuterol for shortness of breath or cough wheezing almost 2-3 times a month. She had baseline sleep study 08/05/2020 done which was consistent with mild obstructive sleep apnea and she is scheduled for CPAP titration study on September 9 and she was titrated to a CPAP of 12 cm. .    Initial history and evaluation. She is an ex-smoker she stopped smoking 2018 used to smoke 3/4 pack/day for 38 years and according to patient she always smoked less than 1 pack/day. December 2019 she started having cough, cough had episodes she claimed that she had cough in the past also but it was never that bad and had been gradually getting worse she get episodes of cough that she feels like that she is having fits she never passed out or syncopal episode but sometimes she feels like because of violent episodes of cough. Cough is mostly dry and not productive of sputum denies purulent sputum denies hemoptysis. Sometimes she feels that she get chest pain and ribs pains because of cough. She also have wheezing wheezing has been worsening for last few months and also associated with shortness of breath. She denies nocturnal awakening with cough wheezing chest tightness or shortness of breath. All the symptoms are worse especially since March for last 3 months or so with worsening cough wheezing and shortness of breath. She denies symptoms of acid reflux or heartburn. She denies postnasal drip nasal congestion or epistaxis. Denies dysphagia no joint pain no stiffness of joints and morning. She had pulmonary function test done which was normal spirometry but airway trapping is evident by increased residual volume and increased total lung capacity.     She does have history of snoring, unrefreshed sleep sometimes tiredness and fatigue during the daytime waking up with a snoring. 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    Past Medical History:        Diagnosis Date    Anxiety 2012    Broken bones 1985    Depression 2012    Gallbladder & bile duct stone with obstruction 2000    Hyperlipidemia 2012    Hypertension 2012    Numbness and tingling     generalized    Stroke (cerebrum) ([address] 2018       Past Surgical History:        Procedure Laterality Date    ABDOMEN SURGERY      repair of bile duct.  CHOLECYSTECTOMY      COLONOSCOPY      ENDOSCOPY, COLON, DIAGNOSTIC      HERNIA REPAIR  2015    HYSTERECTOMY      uterine prolapse, ovaries remain.     [place] COLON CA SCRN NOT HI RSK IND N/A 9/25/2018    -normal    TONSILLECTOMY AND ADENOIDECTOMY      UPPER GASTROINTESTINAL ENDOSCOPY  07/09/2020    UPPER GASTROINTESTINAL ENDOSCOPY N/A 7/9/2020    EGD BIOPSY performed by Ana Howell MD at [address]       Allergies:    No Known Allergies      Home Meds:   Outpatient Encounter Medications as of 12/3/2020   Medication Sig Dispense Refill    dilTIAZem (TIAZAC) 180 MG extended release capsule TAKE ONE CAPSULE BY MOUTH DAILY 90 capsule 1    buPROPion (WELLBUTRIN XL) 150 MG extended release tablet TAKE ONE TABLET BY MOUTH EVERY MORNING 90 tablet 3    metFORMIN (GLUCOPHAGE) 500 MG tablet TAKE ONE TABLET BY MOUTH TWICE A DAY WITH MEALS 180 tablet 3    atorvastatin (LIPITOR) 40 MG tablet TAKE ONE TABLET BY MOUTH ONCE NIGHTLY 90 tablet 3    budesonide-formoterol (SYMBICORT) 160-4.5 MCG/ACT AERO Inhale 2 puffs into the lungs 2 times daily 1 Inhaler 3    omeprazole (PRILOSEC) 20 MG delayed release capsule TAKE ONE CAPSULE BY MOUTH DAILY 90 capsule 3    atenolol (TENORMIN) 25 MG tablet TAKE ONE TABLET BY MOUTH DAILY 90 tablet 3    tiotropium (SPIRIVA HANDIHALER) 18 MCG inhalation capsule Inhale 1 capsule into the lungs daily 90 capsule 3    albuterol sulfate  (90 Base) MCG/ACT inhaler INHALE TWO PUFFS BY MOUTH FOUR TIMES A DAY AS NEEDED FOR WHEEZING 18 g 0    albuterol (PROVENTIL) (2.5 MG/3ML) 0.083% nebulizer solution Take 3 mLs by nebulization every 4 hours as needed for Wheezing or Shortness of Breath 120 each 3    sertraline (ZOLOFT) 50 MG tablet Take 1 tablet by mouth daily (Patient taking differently: Take 25 mg by mouth daily ) 90 tablet 3    aspirin 81 MG chewable tablet Take 81 mg by mouth daily      [DISCONTINUED] buPROPion (WELLBUTRIN XL) 300 MG extended release tablet       [DISCONTINUED] sucralfate (CARAFATE) 1 GM tablet Take 1 tablet by mouth 4 times daily Crush/dissolve and create a slurry (Patient not taking: Reported on 12/1/2020) 120 tablet 3    [DISCONTINUED] hyoscyamine (LEVSIN/SL) 125 MCG sublingual tablet Place 1 tablet under the tongue every 4 hours as needed for Cramping (Patient not taking: Reported on 12/1/2020) 90 tablet 0    Respiratory Therapy Supplies (NEBULIZER/TUBING/MOUTHPIECE) KIT 1 kit by Does not apply route daily as needed (wheezing, sob) 1 kit 0     No facility-administered encounter medications on file as of 12/3/2020. Social History:   TOBACCO:   reports that she quit smoking about 13 months ago. She has a 30.00 pack-year smoking history. She has never used smokeless tobacco.  ETOH:   reports no history of alcohol use.   OCCUPATION:      Family History:       Problem Relation Age of Onset    Asthma Mother     Cancer Mother         Skin    Depression Mother     Hearing Loss Mother     High Cholesterol Mother     High Blood Pressure Mother     Osteoporosis Mother     Stroke Mother        Immunizations:    Immunization History   Administered Date(s) Administered    Influenza, Quadv, IM, PF (6 mo and older Fluzone, Flulaval, Fluarix, and 3 yrs and older Afluria) 09/05/2018, 10/16/2019    Pneumococcal Polysaccharide ([id number]) 06/04/2019         REVIEW OF SYSTEMS:  CONSTITUTIONAL:  negative for  fevers, chills, sweats, fatigue, anorexia, and weight loss  EYES:  negative for  double vision, blurred vision, dry eyes, eye discharge, visual disturbance, redness, and icterus  HEENT:  negative for  hearing loss, tinnitus, ear drainage, earaches, nasal congestion, epistaxis, sore throat, hoarseness, voice change, and postnasal drip  RESPIRATORY: Positive for  dry cough, mild occasional wheezing, dyspnea on exertion, negative for cough with sputum,  hemoptysis, chest pain, and pleuritic pain  CARDIOVASCULAR: Positive for dyspnea, negative for  chest pain, palpitations, orthopnea, PND, exertional chest pressure/discomfort, fatigue, edema, syncope  GASTROINTESTINAL:  negative for nausea, vomiting, diarrhea, constipation, abdominal pain, abdominal mass, abdominal distention, jaundice, dysphagia, reflux, odynophagia, hematemesis, and hemtochezia  GENITOURINARY:  negative for frequency, dysuria, nocturia, and hematuria  HEMATOLOGIC/LYMPHATIC:  negative for easy bruising, bleeding, lymphadenopathy, and petechiae  ALLERGIC/IMMUNOLOGIC:  negative for recurrent infections, urticaria, hay fever, angioedema, anaphylaxis, and drug reactions  ENDOCRINE:  negative for heat intolerance, cold intolerance, tremor, and weight changes  MUSCULOSKELETAL:  negative for  myalgias, arthralgias, joint swelling, stiff joints, and muscle weakness  NEUROLOGICAL:  negative for headaches, dizziness, seizures, memory problems, speech problems, visual disturbance, gait problems, tremor, dysphagia, weakness, numbness, syncope, and tingling  BEHAVIOR/PSYCH:  negative for decreased sleep, decreased energy level, increased energy level, poor concentration, depressed mood, and anxiety        Physical Exam:    Vitals: There were no vitals taken for this visit. Last 3 weights: Wt Readings from Last 3 Encounters:   12/01/20 225 lb (102.1 kg)   10/12/20 234 lb (106.1 kg)   08/20/20 235 lb (106.6 kg)     There is no height or weight on file to calculate BMI.     Physical Examination:   General appearance - alert, well appearing, and in no distress, overweight and acyanotic, in no respiratory distress  Mental status - alert, oriented to person, place, and time  Eyes - pupils equal and reactive, extraocular eye movements intact, sclera anicteric, left eye normal, right eye normal  Ears - right ear normal, left ear normal  Nose - normal and patent, no erythema, discharge or polyps  Mouth - mucous membranes moist, pharynx normal without lesions, large tongue, small oropharynx, low hanging soft palate, Mallampati 2  Neck - supple, no significant adenopathy, short and thick neck  Chest - No tachypnea, retractions or cyanosis bilateral symmetrical chest movement, normal resonance on percussion, air entry is present bilaterally and symmetrical, no expiratory wheezing rhonchi or crackles.   Heart - normal rate, regular rhythm, normal S1, S2, no murmurs, rubs, clicks or gallops  Abdomen - soft, nontender, nondistended, no masses or organomegaly  Neurological - alert, oriented, normal speech, no focal findings or movement disorder noted  Extremities - peripheral pulses normal, no pedal edema, no clubbing or cyanosis  Skin - normal coloration and turgor, no rashes, no suspicious skin lesions noted       LABS:    CBC:   WBC   Date Value Ref Range Status   06/03/2020 5.7 3.5 - 11.3 k/uL Final   10/16/2019 5.7 3.5 - 11.3 k/uL Final   10/15/2019 9.8 3.5 - 11.3 k/uL Final     Hemoglobin   Date Value Ref Range Status   06/03/2020 13.7 11.9 - 15.1 g/dL Final   10/16/2019 11.3 (L) 11.9 - 15.1 g/dL Final   10/15/2019 11.4 (L) 11.9 - 15.1 g/dL Final     Platelets   Date Value Ref Range Status   06/03/2020 302 138 - 453 k/uL Final   10/16/2019 209 138 - 453 k/uL Final   10/15/2019 216 138 - 453 k/uL Final     BMP:   Sodium   Date Value Ref Range Status   06/03/2020 139 135 - 144 mmol/L Final   10/16/2019 139 135 - 144 mmol/L Final   10/15/2019 138 135 - 144 mmol/L Final     Potassium   Date Value Ref Range Status   06/03/2020 4.1 3.7 - 5.3 mmol/L Final   10/16/2019 3.5 (L) 3.7 - 5.3 mmol/L Final   10/15/2019 3.7 3.7 - 5.3 mmol/L Final     Chloride   Date Value Ref Range Status   06/03/2020 104 98 - 107 mmol/L Final   10/16/2019 103 98 - 107 mmol/L Final   10/15/2019 103 98 - 107 mmol/L Final     CO2   Date Value Ref Range Status   06/03/2020 24 20 - 31 mmol/L Final   10/16/2019 21 20 - 31 mmol/L Final   10/15/2019 22 20 - 31 mmol/L Final     BUN   Date Value Ref Range Status   06/03/2020 16 8 - 23 mg/dL Final   10/16/2019 8 8 - 23 mg/dL Final   10/15/2019 8 8 - 23 mg/dL Final     CREATININE   Date Value Ref Range Status   06/03/2020 0.95 (H) 0.50 - 0.90 mg/dL Final   10/16/2019 0.72 0.50 - 0.90 mg/dL Final   10/15/2019 0.79 0.50 - 0.90 mg/dL Final     Glucose   Date Value Ref Range Status   06/03/2020 119 (H) 70 - 99 mg/dL Final   10/16/2019 105 (H) 70 - 99 mg/dL Final   10/15/2019 113 (H) 70 - 99 mg/dL Final     Hepatic:     Amylase: No results found for: AMYLASE  Lipase:     CARDIAC ENZYMES: No results found for: CKTOTAL, CKMB, CKMBINDEX, TROPONINI  BNP: No results found for: BNP  Lipids:   Cholesterol   Date Value Ref Range Status   06/03/2020 166 <200 mg/dL Final     Comment:        Cholesterol Guidelines:      <200  Desirable   200-240  Borderline      >240  Undesirable          HDL   Date Value Ref Range Status   06/03/2020 57 >40 mg/dL Final     Comment:        HDL Guidelines:    <40     Undesirable   40-59    Borderline    >59     Desirable            INR:   INR   Date Value Ref Range Status   08/13/2018 1.0 0.9 - 1.2 Final     Thyroid:   TSH   Date Value Ref Range Status   09/06/2018 3.33 0.30 - 5.00 mIU/L Final     Urinalysis:     Cultures:-  -----------------------------------------------------------------    ABGs: No results found for: PHART, PO2ART, OER9MNI    Pulmonary Functions Testing Results:    06/03/2020: FEV1 2.26 92%, FVC 2.91 92%, [id number] 99%, TLC 6.24 127%, RV 2.84 145% DLCO 25.23 110%    CXR        CT Scans  CT lung screening 06/25/2020  Mediastinum:  The cardiac size is normal. There is no significant   mediastinal, hilar or axillary lymphadenopathy. The thyroid gland shows no   significant abnormalities.  The esophagus shows no significant abnormalities.       Lungs/Pleura:  Mild paraseptal emphysema.       There are no significant nodules or masses.  No focal consolidation.  No   pleural effusion or pneumothorax.  Central airways unremarkable.       Upper Abdomen:  Cholecystectomy.  No adrenal mass.       Soft Tissues/Bones: No acute abnormality of the bones.  The superficial soft   tissues show no significant abnormalities. Assessment and Plan       ICD-10-CM   1. Chronic cough R05   2. TASHIA (obstructive sleep apnea) G47.33   3. Morbid obesity with BMI of 40.0-44.9, adult ([facility] E66.01   4. History of smoking at least 1 pack per day for at least 30 years Z87.891     Assessment:    Chronic cough is most likely related to asthmatic bronchitis, her pulmonary function test on 06/03/2020 shows normal spirometry but her lung volumes consistent with airway trapping, she does have a CT scan which I have reviewed and shows mild paraseptal emphysema and she had long history of smoking no other abnormality seen on the CT scan and no evidence of interstitial lung disease. Started using CPAP on November 9, 2020 last 30 days data is available and she is compliant with CPAP and AHI is almost 2.6 on CPAP of 12 cm and average usage is more than 6 hours. Plan and recommendation:    Continue Symbicort 160/4.52 puff twice daily may down step Symbicort later by decreasing the dose. Compliance with medication and technique of using inhalers discussed with patient. Advised to continue Spiriva at this time and once her symptoms are controlled she may be able to come off the Spiriva. Albuterol to be used as needed. Okay to continue PPI. Continue CPAP at least 4 hours every night. Compliance with CPAP discussed.   Use verified with CPAP  Wt loss is recommended and discussed  Follow good sleep hygeine instructions  Given sleep hygeine instructions  Compliance data reviewed from 11/01/2020 to 11/30/2020 and she is compliant with CPAP    Vaccinations recommended for flu in fall  She will get Flu vaccine from her PCP in 1 week  Up to date with vaccinations from [address] [place] [address] an active lifestyle   PFT's reviewed from June 2020. She will need low-dose screening CT scan yearly and the next one will be last week of June and will schedule when we will see her on the next visit and discussed with patient. Questions answered pertaining to diagnosis and management explained importance of compliance with therapy     Return to clinic in 6 months. It was my pleasure to evaluate Yessi Shanks today. Please call with questions. Please note that this chart was generated using voice recognition Dragon dictation software. Although every effort was made to ensure the accuracy of this automated transcription, some errors in transcription may have occurred. Lydia Smith MD             12/3/2020, 1:30 PM        Yessi Shanks is a 63 y.o. female being evaluated by a Virtual Visit (video/telephone visit) encounter to address concerns as mentioned above. A caregiver was present when appropriate. Due to this being a TeleHealth encounter (During CTAJB-18 public health emergency), evaluation of the following organ systems was limited: Vitals/Constitutional/EENT/Resp/CV/GI//MS/Neuro/Skin/Heme-Lymph-Imm. Pursuant to the emergency declaration under the [facility], [facility] authority and the [employer] Act, this Virtual Visit was conducted with patient's (and/or legal guardian's) consent, to reduce the patient's risk of exposure to COVID-19 and provide necessary medical care.   The patient (and/or legal guardian) has also been advised to contact this office for worsening conditions or problems, and seek emergency medical treatment and/or call 911 if deemed necessary. Patient identification was verified at the start of the visit: Yes  Total time spent for this encounter: 22 minutes  Services were provided through a video/telephone synchronous discussion virtually to substitute for in-person clinic visit. Patient and provider were located at their individual locations at home and office respectively. --Ana María Leahy MD on 12/3/2020 at 1:44 PM    An electronic signature was used to authenticate this note.

## 2020-12-03 NOTE — TELEPHONE ENCOUNTER
Per verbal from Dr. Roge Pruitt he will not prescribe Adipex to this patient, discussed with patient. She will research Contrave and discuss with PCP and will call back if she desires to try that.

## 2020-12-07 ENCOUNTER — TELEPHONE (OUTPATIENT)
Dept: PULMONOLOGY | Age: 60
End: 2020-12-07

## 2020-12-07 RX ORDER — BENZONATATE 100 MG/1
100 CAPSULE ORAL 3 TIMES DAILY PRN
Qty: 30 CAPSULE | Refills: 0 | Status: SHIPPED | OUTPATIENT
Start: 2020-12-07 | End: 2020-12-14

## 2020-12-08 ENCOUNTER — TELEPHONE (OUTPATIENT)
Dept: PRIMARY CARE CLINIC | Age: 60
End: 2020-12-08

## 2020-12-08 NOTE — TELEPHONE ENCOUNTER
Renata has appt w/you on Thursday. She normally has labs every 6 mos but there are no orders for this appt. Do you want her to have labs for this visit?

## 2020-12-09 ENCOUNTER — HOSPITAL ENCOUNTER (OUTPATIENT)
Age: 60
Discharge: HOME | End: 2020-12-09
Payer: MEDICARE

## 2020-12-09 LAB
ALT SERPL-CCNC: 20 U/L (ref 5–33)
ANION GAP SERPL CALCULATED.3IONS-SCNC: 8 MMOL/L (ref 9–17)
AST SERPL-CCNC: 15 U/L
BUN BLDV-MCNC: 11 MG/DL (ref 8–23)
BUN/CREAT BLD: 13 (ref 9–20)
CALCIUM SERPL-MCNC: 9.9 MG/DL (ref 8.6–10.4)
CHLORIDE BLD-SCNC: 100 MMOL/L (ref 98–107)
CO2: 28 MMOL/L (ref 20–31)
CREAT SERPL-MCNC: 0.85 MG/DL (ref 0.5–0.9)
GFR AFRICAN AMERICAN: >60 ML/MIN
GFR NON-AFRICAN AMERICAN: >60 ML/MIN
GFR SERPL CREATININE-BSD FRML MDRD: ABNORMAL ML/MIN/{1.73_M2}
GFR SERPL CREATININE-BSD FRML MDRD: ABNORMAL ML/MIN/{1.73_M2}
GLUCOSE BLD-MCNC: 115 MG/DL (ref 70–99)
POTASSIUM SERPL-SCNC: 4.6 MMOL/L (ref 3.7–5.3)
SODIUM BLD-SCNC: 136 MMOL/L (ref 135–144)

## 2020-12-09 PROCEDURE — 84450 TRANSFERASE (AST) (SGOT): CPT

## 2020-12-09 PROCEDURE — 84460 ALANINE AMINO (ALT) (SGPT): CPT

## 2020-12-09 PROCEDURE — 80048 BASIC METABOLIC PNL TOTAL CA: CPT

## 2020-12-09 PROCEDURE — 36415 COLL VENOUS BLD VENIPUNCTURE: CPT

## 2020-12-10 ENCOUNTER — OFFICE VISIT (OUTPATIENT)
Dept: PRIMARY CARE CLINIC | Age: 60
End: 2020-12-10
Payer: MEDICARE

## 2020-12-10 VITALS
SYSTOLIC BLOOD PRESSURE: 132 MMHG | HEART RATE: 70 BPM | BODY MASS INDEX: 41.17 KG/M2 | OXYGEN SATURATION: 98 % | WEIGHT: 225.1 LBS | RESPIRATION RATE: 20 BRPM | DIASTOLIC BLOOD PRESSURE: 84 MMHG | TEMPERATURE: 98.1 F

## 2020-12-10 LAB
CYTOLOGY REPORT: NORMAL
HBA1C MFR BLD: 5.3 %

## 2020-12-10 PROCEDURE — 83036 HEMOGLOBIN GLYCOSYLATED A1C: CPT | Performed by: NURSE PRACTITIONER

## 2020-12-10 PROCEDURE — 90471 IMMUNIZATION ADMIN: CPT | Performed by: NURSE PRACTITIONER

## 2020-12-10 PROCEDURE — 3023F SPIROM DOC REV: CPT | Performed by: NURSE PRACTITIONER

## 2020-12-10 PROCEDURE — G8427 DOCREV CUR MEDS BY ELIG CLIN: HCPCS | Performed by: NURSE PRACTITIONER

## 2020-12-10 PROCEDURE — 1036F TOBACCO NON-USER: CPT | Performed by: NURSE PRACTITIONER

## 2020-12-10 PROCEDURE — 99214 OFFICE O/P EST MOD 30 MIN: CPT | Performed by: NURSE PRACTITIONER

## 2020-12-10 PROCEDURE — G8417 CALC BMI ABV UP PARAM F/U: HCPCS | Performed by: NURSE PRACTITIONER

## 2020-12-10 PROCEDURE — 3017F COLORECTAL CA SCREEN DOC REV: CPT | Performed by: NURSE PRACTITIONER

## 2020-12-10 PROCEDURE — 90688 IIV4 VACCINE SPLT 0.5 ML IM: CPT | Performed by: NURSE PRACTITIONER

## 2020-12-10 PROCEDURE — G8482 FLU IMMUNIZE ORDER/ADMIN: HCPCS | Performed by: NURSE PRACTITIONER

## 2020-12-10 PROCEDURE — G8926 SPIRO NO PERF OR DOC: HCPCS | Performed by: NURSE PRACTITIONER

## 2020-12-10 RX ORDER — DOXYCYCLINE HYCLATE 100 MG/1
100 CAPSULE ORAL 2 TIMES DAILY
Qty: 20 CAPSULE | Refills: 0 | Status: SHIPPED | OUTPATIENT
Start: 2020-12-10 | End: 2020-12-20

## 2020-12-10 RX ORDER — PREDNISONE 20 MG/1
40 TABLET ORAL DAILY
Qty: 10 TABLET | Refills: 0 | Status: SHIPPED | OUTPATIENT
Start: 2020-12-10 | End: 2020-12-15

## 2020-12-10 ASSESSMENT — ENCOUNTER SYMPTOMS
ABDOMINAL PAIN: 0
COUGH: 1
DIARRHEA: 0
NAUSEA: 0
SHORTNESS OF BREATH: 0
VOMITING: 0
SPUTUM PRODUCTION: 1
CHEST TIGHTNESS: 0
WHEEZING: 0
HOARSE VOICE: 0
RHINORRHEA: 0
DIFFICULTY BREATHING: 0
FREQUENT THROAT CLEARING: 0
CONSTIPATION: 0
HEMOPTYSIS: 0
SORE THROAT: 1

## 2020-12-10 ASSESSMENT — COPD QUESTIONNAIRES: COPD: 1

## 2020-12-10 NOTE — PROGRESS NOTES
Name: Marisa Hussein  : 1960         Chief Complaint:     Chief Complaint   Patient presents with    URI     cough, congestion, states had phone appt with Dr Darryle Generous, yellow green nasal drainage    COPD       History of Present Illness:      Marisa Hussein is a 63 y.o.  female who presents with URI (cough, congestion, states had phone appt with Dr Darryle Generous, yellow green nasal drainage) and COPD      Baron Pagan is here today for routine office visit. Metabolic syndrome-much improved, patient is stop drinking alcohol. A1c is 5.3. Tolerating Metformin well. Has lost approximately 20 pounds. Cough   This is a recurrent problem. The current episode started 1 to 4 weeks ago. The problem has been gradually worsening. Episode frequency: Intermittently. The cough is productive of sputum. Associated symptoms include postnasal drip and a sore throat. Pertinent negatives include no chest pain, chills, fever, headaches, hemoptysis, rash, rhinorrhea, shortness of breath or wheezing. The symptoms are aggravated by lying down and cold air. She has tried a beta-agonist inhaler and rest for the symptoms. The treatment provided moderate relief. Her past medical history is significant for asthma, bronchitis, COPD and pneumonia. There is no history of bronchiectasis or emphysema. COPD   She complains of cough and sputum production. There is no chest tightness, difficulty breathing, frequent throat clearing, hemoptysis, hoarse voice, shortness of breath or wheezing. This is a chronic problem. The current episode started more than 1 year ago. The problem occurs intermittently. The problem has been unchanged. The cough is productive of sputum. Associated symptoms include postnasal drip and a sore throat. Pertinent negatives include no chest pain, fever, headaches or rhinorrhea. Her symptoms are aggravated by URI and change in weather. Her symptoms are alleviated by beta-agonist, rest, steroid inhaler and oral steroids.  She reports significant improvement on treatment. Her past medical history is significant for asthma, bronchitis, COPD and pneumonia. There is no history of bronchiectasis or emphysema. Past Medical History:     Past Medical History:   Diagnosis Date    Anxiety 2012    Broken bones 1985    Depression 2012    Gallbladder & bile duct stone with obstruction 2000    Hyperlipidemia 2012    Hypertension 2012    Numbness and tingling     generalized    Stroke (cerebrum) ([facility] [address] 2018      Reviewed all health maintenance requirements and ordered appropriate tests  There are no preventive care reminders to display for this patient. Past Surgical History:     Past Surgical History:   Procedure Laterality Date    ABDOMEN SURGERY      repair of bile duct.  CHOLECYSTECTOMY      COLONOSCOPY      ENDOSCOPY, COLON, DIAGNOSTIC      HERNIA REPAIR  2015    HYSTERECTOMY      uterine prolapse, ovaries remain.  DE COLON CA SCRN NOT HI RSK IND N/A 9/25/2018    -normal    TONSILLECTOMY AND ADENOIDECTOMY      UPPER GASTROINTESTINAL ENDOSCOPY  07/09/2020    UPPER GASTROINTESTINAL ENDOSCOPY N/A 7/9/2020    EGD BIOPSY performed by [facility] Enriquez [facility] OR        Medications:       Prior to Admission medications    Medication Sig Start Date End Date Taking?  Authorizing Provider   doxycycline hyclate (VIBRAMYCIN) 100 MG capsule Take 1 capsule by mouth 2 times daily for 10 days 12/10/20 12/20/20 Yes Marcus Prajapati Might, APRN - CNP   predniSONE (DELTASONE) 20 MG tablet Take 2 tablets by mouth daily for 5 days 12/10/20 12/15/20 Yes Marcus Prajapati Might, APRN - CNP   benzonatate (TESSALON) 100 MG capsule Take 1 capsule by mouth 3 times daily as needed for Cough 12/7/20 12/14/20 Yes Norma Rivera MD   dilTIAZem (TIAZAC) 180 MG extended release capsule TAKE ONE CAPSULE BY MOUTH DAILY 12/1/20  Yes Elallen Rival Might, APRN - CNP   buPROPion (WELLBUTRIN XL) 150 MG extended release tablet TAKE ONE TABLET BY MOUTH EVERY MORNING 10/21/20  Yes OLIVER Smith CNP   metFORMIN (GLUCOPHAGE) 500 MG tablet TAKE ONE TABLET BY MOUTH TWICE A DAY WITH MEALS 7/29/20  Yes OLIVER Smith CNP   atorvastatin (LIPITOR) 40 MG tablet TAKE ONE TABLET BY MOUTH ONCE NIGHTLY 7/29/20  Yes OLIVER Smith CNP   budesonide-formoterol (SYMBICORT) 160-4.5 MCG/ACT AERO Inhale 2 puffs into the lungs 2 times daily 7/2/20  Yes Leah Dennis MD   omeprazole (PRILOSEC) 20 MG delayed release capsule TAKE ONE CAPSULE BY MOUTH DAILY 6/29/20  Yes OLIVER Smith CNP   atenolol (TENORMIN) 25 MG tablet TAKE ONE TABLET BY MOUTH DAILY 5/28/20  Yes OLIVER Smith CNP   tiotropium (SPIRIVA HANDIHALER) 18 MCG inhalation capsule Inhale 1 capsule into the lungs daily 5/26/20  Yes OLIVER Smith CNP   albuterol sulfate  (90 Base) MCG/ACT inhaler INHALE TWO PUFFS BY MOUTH FOUR TIMES A DAY AS NEEDED FOR WHEEZING 3/20/20  Yes [de-identified] M DeaOLIVER sequeira CNP   Respiratory Therapy Supplies (NEBULIZER/TUBING/MOUTHPIECE) KIT 1 kit by Does not apply route daily as needed (wheezing, sob) 2/13/20  Yes OLIVER Smith CNP   albuterol (PROVENTIL) (2.5 MG/3ML) 0.083% nebulizer solution Take 3 mLs by nebulization every 4 hours as needed for Wheezing or Shortness of Breath 2/13/20  Yes OLIVER Smith CNP   sertraline (ZOLOFT) 50 MG tablet Take 1 tablet by mouth daily  Patient taking differently: Take 25 mg by mouth daily  10/28/19  Yes OLIVER Smith CNP   aspirin 81 MG chewable tablet Take 81 mg by mouth daily   Yes Historical Provider, MD        Allergies:       Patient has no known allergies. Social History:     Tobacco:    reports that she quit smoking about 14 months ago. She has a 30.00 pack-year smoking history. She has never used smokeless tobacco.  Alcohol:      reports no history of alcohol use. Drug Use:  reports no history of drug use.     Family History:     Family History   Problem Relation Age of Onset    Asthma Mother    Kyle Cancer Mother         Skin    Depression Mother     Hearing Loss Mother     High Cholesterol Mother     High Blood Pressure Mother     Osteoporosis Mother     Stroke Mother        Review of Systems:     Positive and Negative as described in HPI    Review of Systems   Constitutional: Negative for chills, fatigue and fever. HENT: Positive for congestion, postnasal drip and sore throat. Negative for hoarse voice and rhinorrhea. Eyes: Negative for visual disturbance. Respiratory: Positive for cough and sputum production. Negative for hemoptysis, shortness of breath and wheezing. Cardiovascular: Negative for chest pain and palpitations. Gastrointestinal: Negative for abdominal pain, constipation, diarrhea, nausea and vomiting. Genitourinary: Negative for difficulty urinating and dysuria. Musculoskeletal: Negative for gait problem, neck pain and neck stiffness. Skin: Negative for rash. Neurological: Negative for dizziness, syncope, light-headedness and headaches. Physical Exam:   Vitals:  /84 (Site: Left Upper Arm, Position: Sitting, Cuff Size: Large Adult)   Pulse 70   Temp 98.1 °F (36.7 °C) (Temporal)   Resp 20   Wt 225 lb 1.6 oz (102.1 kg)   SpO2 98%   BMI 41.17 kg/m²     Physical Exam  Vitals signs and nursing note reviewed. Constitutional:       General: She is not in acute distress. Appearance: Normal appearance. She is obese. She is not ill-appearing. HENT:      Head: Normocephalic. Nose: Congestion present. Mouth/Throat:      Mouth: Mucous membranes are moist.      Pharynx: Posterior oropharyngeal erythema present. Eyes:      General: No scleral icterus. Conjunctiva/sclera: Conjunctivae normal.   Neck:      Musculoskeletal: Normal range of motion and neck supple. Cardiovascular:      Rate and Rhythm: Normal rate and regular rhythm. Heart sounds: No murmur. Pulmonary:      Effort: Pulmonary effort is normal. No respiratory distress. Breath sounds: Normal breath sounds. No stridor. No wheezing, rhonchi or rales. Abdominal:      General: Bowel sounds are normal. There is no distension. Palpations: Abdomen is soft. Tenderness: There is no abdominal tenderness. Musculoskeletal:      Right lower leg: No edema. Left lower leg: No edema. Lymphadenopathy:      Cervical: No cervical adenopathy. Skin:     General: Skin is warm and dry. Findings: No rash. Neurological:      Mental Status: She is alert and oriented to person, place, and time. Psychiatric:         Attention and Perception: Attention normal.         Mood and Affect: Mood and affect normal. Mood is not anxious or depressed. Speech: Speech normal.         Behavior: Behavior normal. Behavior is cooperative. Thought Content: Thought content does not include homicidal or suicidal ideation. Thought content does not include homicidal or suicidal plan. Cognition and Memory: Cognition normal.         Data:     Lab Results   Component Value Date     12/09/2020    K 4.6 12/09/2020     12/09/2020    CO2 28 12/09/2020    BUN 11 12/09/2020    CREATININE 0.85 12/09/2020    GLUCOSE 115 12/09/2020    PROT 6.6 10/16/2019    LABALBU 3.6 10/16/2019    BILITOT 0.35 10/16/2019    ALKPHOS 75 10/16/2019    AST 15 12/09/2020    ALT 20 12/09/2020     Lab Results   Component Value Date    WBC 5.7 06/03/2020    RBC 4.73 06/03/2020    HGB 13.7 06/03/2020    HCT 44.6 06/03/2020    MCV 94.3 06/03/2020    MCH 29.0 06/03/2020    MCHC 30.7 06/03/2020    RDW 14.3 06/03/2020     06/03/2020    MPV 9.9 06/03/2020     Lab Results   Component Value Date    TSH 3.33 09/06/2018     Lab Results   Component Value Date    CHOL 166 06/03/2020    HDL 57 06/03/2020    LABA1C 5.3 12/10/2020       Assessment/Plan:      Diagnosis Orders   1. COPD with acute bronchitis (HCC)  doxycycline hyclate (VIBRAMYCIN) 100 MG capsule    predniSONE (DELTASONE) 20 MG tablet   2. Chronic obstructive pulmonary disease, unspecified COPD type ([facility] [address]     3. Metabolic syndrome  POCT glycosylated hemoglobin (Hb A1C)    CBC Auto Differential    ALT    AST    Basic Metabolic Panel    Lipid Panel    Hemoglobin A1C    Microalbumin, Ur   4. Need for influenza vaccination  INFLUENZA, QUADV, 3 YRS AND OLDER, IM, MDV, 0.5ML (Petra Seymour)       1Olivia Yanez received counseling on the following healthy behaviors: nutrition, exercise and medication adherence  2. Patient given educational materials - see patient instructions  3. Was a self-tracking handout given in paper form or via [employer]? No  If yes, see orders or list here. 4.  Discussed use, benefit, and side effects of prescribed medications. Barriers to medication compliance addressed. All patient questions answered. Pt voiced understanding. 5.  Reviewed prior labs and health maintenance  6. Continue current medications, diet and exercise. Completed Refills   Requested Prescriptions     Signed Prescriptions Disp Refills    doxycycline hyclate (VIBRAMYCIN) 100 MG capsule 20 capsule 0     Sig: Take 1 capsule by mouth 2 times daily for 10 days    predniSONE (DELTASONE) 20 MG tablet 10 tablet 0     Sig: Take 2 tablets by mouth daily for 5 days         Return in about 6 months (around 6/10/2021) for Check up.

## 2020-12-10 NOTE — PROGRESS NOTES
Vaccine Information Sheet, \"Influenza - Inactivated\"  given to Belen Stone, or parent/legal guardian of  Belen Stone and verbalized understanding. Patient responses:    Have you ever had a reaction to a flu vaccine? No  Are you able to eat eggs without adverse effects? Yes  Do you have any current illness? No  Have you ever had Guillian Jasper Syndrome? No    Flu vaccine given per order. Please see immunization tab. After obtaining consent, and per orders of Raj Salgado CNP, injection of influenza vaccine given in Left deltoid by Erickson Phoenix. Patient instructed to remain in clinic for 20 minutes afterwards, and to report any adverse reaction to me immediately.

## 2021-02-10 RX ORDER — BUDESONIDE AND FORMOTEROL FUMARATE DIHYDRATE 160; 4.5 UG/1; UG/1
AEROSOL RESPIRATORY (INHALATION)
Qty: 10.2 G | Refills: 2 | Status: SHIPPED | OUTPATIENT
Start: 2021-02-10 | End: 2021-07-01 | Stop reason: SDUPTHER

## 2021-03-26 ENCOUNTER — OFFICE VISIT (OUTPATIENT)
Dept: PRIMARY CARE CLINIC | Age: 61
End: 2021-03-26
Payer: MEDICARE

## 2021-03-26 VITALS
SYSTOLIC BLOOD PRESSURE: 136 MMHG | WEIGHT: 241.4 LBS | OXYGEN SATURATION: 96 % | HEART RATE: 102 BPM | DIASTOLIC BLOOD PRESSURE: 78 MMHG | BODY MASS INDEX: 44.42 KG/M2 | TEMPERATURE: 98.3 F | HEIGHT: 62 IN | RESPIRATION RATE: 18 BRPM

## 2021-03-26 DIAGNOSIS — J44.1 COPD EXACERBATION (HCC): ICD-10-CM

## 2021-03-26 DIAGNOSIS — J00 ACUTE RHINITIS: Primary | ICD-10-CM

## 2021-03-26 PROCEDURE — 3023F SPIROM DOC REV: CPT | Performed by: NURSE PRACTITIONER

## 2021-03-26 PROCEDURE — G8482 FLU IMMUNIZE ORDER/ADMIN: HCPCS | Performed by: NURSE PRACTITIONER

## 2021-03-26 PROCEDURE — G8926 SPIRO NO PERF OR DOC: HCPCS | Performed by: NURSE PRACTITIONER

## 2021-03-26 PROCEDURE — 99213 OFFICE O/P EST LOW 20 MIN: CPT | Performed by: NURSE PRACTITIONER

## 2021-03-26 PROCEDURE — 3017F COLORECTAL CA SCREEN DOC REV: CPT | Performed by: NURSE PRACTITIONER

## 2021-03-26 PROCEDURE — G8417 CALC BMI ABV UP PARAM F/U: HCPCS | Performed by: NURSE PRACTITIONER

## 2021-03-26 PROCEDURE — 1036F TOBACCO NON-USER: CPT | Performed by: NURSE PRACTITIONER

## 2021-03-26 PROCEDURE — G8427 DOCREV CUR MEDS BY ELIG CLIN: HCPCS | Performed by: NURSE PRACTITIONER

## 2021-03-26 RX ORDER — PREDNISONE 20 MG/1
40 TABLET ORAL DAILY
Qty: 10 TABLET | Refills: 0 | Status: SHIPPED | OUTPATIENT
Start: 2021-03-26 | End: 2021-03-31

## 2021-03-26 RX ORDER — FLUTICASONE PROPIONATE 50 MCG
1 SPRAY, SUSPENSION (ML) NASAL DAILY
Qty: 1 BOTTLE | Refills: 0 | Status: SHIPPED | OUTPATIENT
Start: 2021-03-26

## 2021-03-26 SDOH — ECONOMIC STABILITY: TRANSPORTATION INSECURITY
IN THE PAST 12 MONTHS, HAS THE LACK OF TRANSPORTATION KEPT YOU FROM MEDICAL APPOINTMENTS OR FROM GETTING MEDICATIONS?: PATIENT DECLINED

## 2021-03-26 SDOH — ECONOMIC STABILITY: TRANSPORTATION INSECURITY
IN THE PAST 12 MONTHS, HAS LACK OF TRANSPORTATION KEPT YOU FROM MEETINGS, WORK, OR FROM GETTING THINGS NEEDED FOR DAILY LIVING?: PATIENT DECLINED

## 2021-03-26 SDOH — ECONOMIC STABILITY: FOOD INSECURITY: WITHIN THE PAST 12 MONTHS, YOU WORRIED THAT YOUR FOOD WOULD RUN OUT BEFORE YOU GOT MONEY TO BUY MORE.: PATIENT DECLINED

## 2021-03-26 SDOH — ECONOMIC STABILITY: INCOME INSECURITY: HOW HARD IS IT FOR YOU TO PAY FOR THE VERY BASICS LIKE FOOD, HOUSING, MEDICAL CARE, AND HEATING?: NOT HARD AT ALL

## 2021-03-26 ASSESSMENT — ENCOUNTER SYMPTOMS
SHORTNESS OF BREATH: 1
TROUBLE SWALLOWING: 0
VOMITING: 0
SORE THROAT: 1
RHINORRHEA: 1
SINUS PRESSURE: 1
WHEEZING: 1
COUGH: 1
SINUS PAIN: 0
NAUSEA: 0

## 2021-03-26 NOTE — PROGRESS NOTES
[address] [facility] [facility]  [address] [facility] [address] [facility]  Dept: [phone]  Dept Fax: [phone]    Ron Lewis is a 63 y.o. female who [facility] the [facility] today for her medical conditions/complaints as noted below. Ron Lewis is c/o of Cough (x 2 days.), Pharyngitis (x 2 days.), and Nasal Congestion (x 2 days. )      HPI:     Zeina Gao is here today for a walk in visit. She reports over the last 2 days she has had upper respiratory congestion a cough and a sore throat. She has a history of COPD and has been using her inhaler more often. She reports that her daughter also has similar symptoms. She denies any known COVID-19 exposure. She has had her first dose of the Covid vaccine a few weeks ago. See below for further detail. URI   This is a new problem. The current episode started yesterday. The problem has been unchanged. There has been no fever. Associated symptoms include congestion, coughing (Nonproductive), rhinorrhea, a sore throat and wheezing. Pertinent negatives include no chest pain, ear pain, joint swelling, nausea, sinus pain, sneezing or vomiting. She has tried decongestant for the symptoms. The treatment provided moderate relief.        Past Medical History:   Diagnosis Date    Anxiety 2012    Broken bones 1985    Depression 2012    Gallbladder & bile duct stone with obstruction 2000    Hyperlipidemia 2012    Hypertension 2012    Numbness and tingling     generalized    Stroke (cerebrum) ([facility]) 2018        Current Outpatient Medications   Medication Sig Dispense Refill    predniSONE (DELTASONE) 20 MG tablet Take 2 tablets by mouth daily for 5 days 10 tablet 0    fluticasone (FLONASE) 50 MCG/ACT nasal spray 1 spray by Each Nostril route daily 1 Bottle 0    albuterol sulfate  (90 Base) MCG/ACT inhaler INHALE TWO PUFFS BY MOUTH FOUR TIMES A DAY AS NEEDED FOR WHEEZING 18 g 3    SYMBICORT 160-4.5 MCG/ACT AERO INHALE TWO PUFFS BY MOUTH TWICE A DAY 10.2 g 2    dilTIAZem (TIAZAC) 180 MG extended release capsule TAKE ONE CAPSULE BY MOUTH DAILY 90 capsule 1    omeprazole (PRILOSEC) 20 MG delayed release capsule TAKE ONE CAPSULE BY MOUTH DAILY 90 capsule 3    tiotropium (SPIRIVA HANDIHALER) 18 MCG inhalation capsule Inhale 1 capsule into the lungs daily 90 capsule 3    Respiratory Therapy Supplies (NEBULIZER/TUBING/MOUTHPIECE) KIT 1 kit by Does not apply route daily as needed (wheezing, sob) 1 kit 0    aspirin 81 MG chewable tablet Take 81 mg by mouth daily      buPROPion (WELLBUTRIN XL) 150 MG extended release tablet TAKE ONE TABLET BY MOUTH EVERY MORNING (Patient not taking: Reported on 3/26/2021) 90 tablet 3    metFORMIN (GLUCOPHAGE) 500 MG tablet TAKE ONE TABLET BY MOUTH TWICE A DAY WITH MEALS (Patient not taking: Reported on 3/26/2021) 180 tablet 3    atorvastatin (LIPITOR) 40 MG tablet TAKE ONE TABLET BY MOUTH ONCE NIGHTLY (Patient not taking: Reported on 3/26/2021) 90 tablet 3    atenolol (TENORMIN) 25 MG tablet TAKE ONE TABLET BY MOUTH DAILY (Patient not taking: Reported on 3/26/2021) 90 tablet 3    albuterol (PROVENTIL) (2.5 MG/3ML) 0.083% nebulizer solution Take 3 mLs by nebulization every 4 hours as needed for Wheezing or Shortness of Breath 120 each 3    sertraline (ZOLOFT) 50 MG tablet Take 1 tablet by mouth daily (Patient not taking: Reported on 3/26/2021) 90 tablet 3     No current facility-administered medications for this visit. No Known Allergies    Subjective:      Review of Systems   Constitutional: Negative for activity change, fatigue and fever. HENT: Positive for congestion, postnasal drip, rhinorrhea, sinus pressure and sore throat. Negative for ear pain, nosebleeds, sinus pain, sneezing and trouble swallowing. Respiratory: Positive for cough (Nonproductive), shortness of breath and wheezing. Cardiovascular: Negative for chest pain.    Gastrointestinal: Negative for nausea and She has not had any fever chills denies fatigue. I do not think that she needs to be tested at this time. Plan:     Discussed exam, POCT findings, plan of care (including prescriptive and supportive as listed below) and follow-up atlength with patient. Reviewed all prescribed and recommended medications, administration and side effects. Encouraged to return to [address] [place] [address] for noimprovement and or worsening of symptoms. To ER or call 911 if any difficulty breathing, shortness of breath, inability to swallow, hives or temp greater than 103 degrees. Questions answered. They verbalized understandingand agreement. Return if symptoms worsen or fail to improve. Orders Placed This Encounter   Medications    predniSONE (DELTASONE) 20 MG tablet     Sig: Take 2 tablets by mouth daily for 5 days     Dispense:  10 tablet     Refill:  0    fluticasone (FLONASE) 50 MCG/ACT nasal spray     Si spray by Each Nostril route daily     Dispense:  1 Bottle     Refill:  0          All patient questions answered. Pt voiced understanding.       Electronically signed by OLIVER Russo CNP on 3/26/2021 at 3:46 PM

## 2021-03-26 NOTE — PATIENT INSTRUCTIONS
SURVEY:    You may be receiving a survey from [facility] regarding your visit today. Please complete the survey to enable us to provide the highest quality of care to you and your family. If you cannot score us a very good on any question, please call the office to discuss how we could have made your experience a very good one. Thank you.

## 2021-05-28 DIAGNOSIS — R05.9 COUGH: ICD-10-CM

## 2021-05-28 RX ORDER — TIOTROPIUM BROMIDE 18 UG/1
CAPSULE ORAL; RESPIRATORY (INHALATION)
Qty: 30 CAPSULE | Refills: 2 | Status: SHIPPED | OUTPATIENT
Start: 2021-05-28 | End: 2021-07-01 | Stop reason: ALTCHOICE

## 2021-05-28 NOTE — TELEPHONE ENCOUNTER
Health Maintenance   Topic Date Due    COVID-19 Vaccine (2 - Moderna 2-dose series) 04/06/2021    Lipid screen  06/03/2021    DTaP/Tdap/Td vaccine (1 - Tdap) 06/09/2021 (Originally 7/12/1978)    Shingles Vaccine (1 of 2) 06/09/2021 (Originally 7/12/2009)    Hepatitis C screen  06/09/2021 (Originally 1960)    HIV screen  06/09/2021 (Originally 7/12/1974)    Low dose CT lung screening  06/25/2021    Breast cancer screen  11/09/2022    Cervical cancer screen  12/01/2023    Diabetes screen  12/10/2023    Pneumococcal 0-64 years Vaccine (2 of 2) 07/12/2024    Colon cancer screen colonoscopy  09/25/2028    Flu vaccine  Completed    Hepatitis A vaccine  Aged Out    Hepatitis B vaccine  Aged Out    Hib vaccine  Aged Out    Meningococcal (ACWY) vaccine  Aged Out             (applicable per patient's age: Cancer Screenings, Depression Screening, Fall Risk Screening, Immunizations)    Hemoglobin A1C (%)   Date Value   12/10/2020 5.3   05/30/2019 5.3   09/06/2018 5.1     Microalb/Crt.  Ratio (mcg/mg creat)   Date Value   06/03/2020 CANNOT BE CALCULATED     LDL Cholesterol (mg/dL)   Date Value   06/03/2020 68     AST (U/L)   Date Value   12/09/2020 15     ALT (U/L)   Date Value   12/09/2020 20     BUN (mg/dL)   Date Value   12/09/2020 11      (goal A1C is < 7)   (goal LDL is <100) need 30-50% reduction from baseline     BP Readings from Last 3 Encounters:   03/26/21 136/78   12/10/20 132/84   12/01/20 136/88    (goal /80)      All Future Testing planned in [facility]:  Lab Frequency Next Occurrence   CBC Auto Differential Once 06/08/2021   ALT Once 06/10/2021   AST Once 50/43/0482   Basic Metabolic Panel Once 41/52/9702   Lipid Panel Once 06/08/2021   Hemoglobin A1C Once 06/08/2021   Microalbumin, Ur Once 06/08/2021       Next Visit Date:  Future Appointments   Date Time Provider Ryanne Mark   6/10/2021 11:40 AM Michelle Rumps Might, APRN - CNP [facility]   7/1/2021 10:15 AM Terrell Pinedo MD [facility]            Patient Active Problem List:     Moderate episode of recurrent major depressive disorder (HCC)     Gastroesophageal reflux disease     Neurological symptoms     Acute diverticulitis     Anxiety     Depression     Hypertension     Hyperlipidemia     Morbidly obese (HCC)     Former smoker     Chronic obstructive pulmonary disease ([facility] [address]     Dysphagia

## 2021-06-16 ENCOUNTER — TELEPHONE (OUTPATIENT)
Dept: PRIMARY CARE CLINIC | Age: 61
End: 2021-06-16

## 2021-06-16 NOTE — TELEPHONE ENCOUNTER
Called patient and reminded her to have her labs done that Errol Wilson had ordered in December to be done in June. Patient states that she is in [place] for the past couple week and when she returns she will have her labs done and call to reschedule her appt.

## 2021-06-18 ENCOUNTER — OFFICE VISIT (OUTPATIENT)
Dept: PRIMARY CARE CLINIC | Age: 61
End: 2021-06-18
Payer: MEDICARE

## 2021-06-18 VITALS
WEIGHT: 244.4 LBS | HEIGHT: 62 IN | RESPIRATION RATE: 18 BRPM | BODY MASS INDEX: 44.98 KG/M2 | DIASTOLIC BLOOD PRESSURE: 88 MMHG | TEMPERATURE: 98.8 F | SYSTOLIC BLOOD PRESSURE: 138 MMHG | OXYGEN SATURATION: 96 % | HEART RATE: 104 BPM

## 2021-06-18 DIAGNOSIS — J04.0 LARYNGITIS: ICD-10-CM

## 2021-06-18 DIAGNOSIS — J02.9 SORE THROAT: Primary | ICD-10-CM

## 2021-06-18 LAB — S PYO AG THROAT QL: NORMAL

## 2021-06-18 PROCEDURE — 99213 OFFICE O/P EST LOW 20 MIN: CPT | Performed by: NURSE PRACTITIONER

## 2021-06-18 PROCEDURE — 87880 STREP A ASSAY W/OPTIC: CPT | Performed by: NURSE PRACTITIONER

## 2021-06-18 PROCEDURE — G8427 DOCREV CUR MEDS BY ELIG CLIN: HCPCS | Performed by: NURSE PRACTITIONER

## 2021-06-18 PROCEDURE — G8417 CALC BMI ABV UP PARAM F/U: HCPCS | Performed by: NURSE PRACTITIONER

## 2021-06-18 PROCEDURE — 1036F TOBACCO NON-USER: CPT | Performed by: NURSE PRACTITIONER

## 2021-06-18 PROCEDURE — 3017F COLORECTAL CA SCREEN DOC REV: CPT | Performed by: NURSE PRACTITIONER

## 2021-06-18 NOTE — PATIENT INSTRUCTIONS
SURVEY:    You may be receiving a survey from [employer] regarding your visit today. Please complete the survey to enable us to provide the highest quality of care to you and your family. If you cannot score us a very good on any question, please call the office to discuss how we could have made your experience a very good one. Thank you.   Brian Salgado, APRN-CNP  Taco Carlos, APRN-CNP  RADHA Shane LPN [place], MA Tyson Plummer, MA Pam, PCA

## 2021-06-18 NOTE — PROGRESS NOTES
release capsule TAKE ONE CAPSULE BY MOUTH DAILY 90 capsule 1    omeprazole (PRILOSEC) 20 MG delayed release capsule TAKE ONE CAPSULE BY MOUTH DAILY 90 capsule 3    Respiratory Therapy Supplies (NEBULIZER/TUBING/MOUTHPIECE) KIT 1 kit by Does not apply route daily as needed (wheezing, sob) 1 kit 0    albuterol (PROVENTIL) (2.5 MG/3ML) 0.083% nebulizer solution Take 3 mLs by nebulization every 4 hours as needed for Wheezing or Shortness of Breath 120 each 3    aspirin 81 MG chewable tablet Take 81 mg by mouth daily      atenolol (TENORMIN) 25 MG tablet TAKE ONE TABLET BY MOUTH DAILY (Patient not taking: Reported on 6/18/2021) 90 tablet 3    fluticasone (FLONASE) 50 MCG/ACT nasal spray 1 spray by Each Nostril route daily (Patient not taking: Reported on 6/18/2021) 1 Bottle 0    buPROPion (WELLBUTRIN XL) 150 MG extended release tablet TAKE ONE TABLET BY MOUTH EVERY MORNING (Patient not taking: Reported on 3/26/2021) 90 tablet 3    metFORMIN (GLUCOPHAGE) 500 MG tablet TAKE ONE TABLET BY MOUTH TWICE A DAY WITH MEALS (Patient not taking: Reported on 3/26/2021) 180 tablet 3    atorvastatin (LIPITOR) 40 MG tablet TAKE ONE TABLET BY MOUTH ONCE NIGHTLY (Patient not taking: Reported on 3/26/2021) 90 tablet 3    sertraline (ZOLOFT) 50 MG tablet Take 1 tablet by mouth daily (Patient not taking: Reported on 3/26/2021) 90 tablet 3     No current facility-administered medications for this visit. No Known Allergies    Subjective:      Review of Systems   Constitutional: Negative for activity change, appetite change and fever. HENT: Positive for sore throat and voice change (Hoarse voice). Negative for congestion, ear pain, postnasal drip, rhinorrhea, sinus pain and trouble swallowing. Respiratory: Negative for cough, shortness of breath and wheezing. Gastrointestinal: Negative for change in bowel habit and nausea. Neurological: Negative for headaches.        Objective:     Physical Exam  Vitals and nursing note improve. Return if symptoms worsen or fail to improve. No orders of the defined types were placed in this encounter. All patient questions answered. Pt voiced understanding.       Electronically signed by OLIVER Simon CNP on 6/22/2021 at 9:04 AM

## 2021-06-22 ASSESSMENT — ENCOUNTER SYMPTOMS
TROUBLE SWALLOWING: 0
CHANGE IN BOWEL HABIT: 0
SORE THROAT: 1
NAUSEA: 0
SHORTNESS OF BREATH: 0
SINUS PAIN: 0
WHEEZING: 0
RHINORRHEA: 0
COUGH: 0
VOICE CHANGE: 1

## 2021-06-23 ENCOUNTER — TELEPHONE (OUTPATIENT)
Dept: PRIMARY CARE CLINIC | Age: 61
End: 2021-06-23

## 2021-06-23 NOTE — TELEPHONE ENCOUNTER
Called patient and reminded her to have her labs done that Ele had ordered. Patient states that she will have them done, h she \"doesn't have a minute for herself\".

## 2021-07-01 ENCOUNTER — OFFICE VISIT (OUTPATIENT)
Dept: PULMONOLOGY | Age: 61
End: 2021-07-01
Payer: MEDICARE

## 2021-07-01 VITALS
OXYGEN SATURATION: 95 % | DIASTOLIC BLOOD PRESSURE: 107 MMHG | HEIGHT: 62 IN | BODY MASS INDEX: 45.21 KG/M2 | RESPIRATION RATE: 18 BRPM | SYSTOLIC BLOOD PRESSURE: 158 MMHG | WEIGHT: 245.7 LBS | TEMPERATURE: 97.3 F | HEART RATE: 94 BPM

## 2021-07-01 DIAGNOSIS — Z87.891 PERSONAL HISTORY OF TOBACCO USE: ICD-10-CM

## 2021-07-01 DIAGNOSIS — J18.9 ATYPICAL PNEUMONIA: ICD-10-CM

## 2021-07-01 DIAGNOSIS — Z87.891 HISTORY OF SMOKING 30 OR MORE PACK YEARS: ICD-10-CM

## 2021-07-01 DIAGNOSIS — G47.33 OSA (OBSTRUCTIVE SLEEP APNEA): ICD-10-CM

## 2021-07-01 DIAGNOSIS — R05.3 CHRONIC COUGH: Primary | ICD-10-CM

## 2021-07-01 DIAGNOSIS — E66.01 MORBID OBESITY WITH BMI OF 40.0-44.9, ADULT (HCC): ICD-10-CM

## 2021-07-01 DIAGNOSIS — J45.40 MODERATE PERSISTENT ASTHMATIC BRONCHITIS WITHOUT COMPLICATION: ICD-10-CM

## 2021-07-01 PROCEDURE — 3017F COLORECTAL CA SCREEN DOC REV: CPT | Performed by: INTERNAL MEDICINE

## 2021-07-01 PROCEDURE — 1036F TOBACCO NON-USER: CPT | Performed by: INTERNAL MEDICINE

## 2021-07-01 PROCEDURE — G0296 VISIT TO DETERM LDCT ELIG: HCPCS | Performed by: INTERNAL MEDICINE

## 2021-07-01 PROCEDURE — G8417 CALC BMI ABV UP PARAM F/U: HCPCS | Performed by: INTERNAL MEDICINE

## 2021-07-01 PROCEDURE — G8427 DOCREV CUR MEDS BY ELIG CLIN: HCPCS | Performed by: INTERNAL MEDICINE

## 2021-07-01 PROCEDURE — 99213 OFFICE O/P EST LOW 20 MIN: CPT | Performed by: INTERNAL MEDICINE

## 2021-07-01 RX ORDER — BUDESONIDE AND FORMOTEROL FUMARATE DIHYDRATE 160; 4.5 UG/1; UG/1
2 AEROSOL RESPIRATORY (INHALATION) 2 TIMES DAILY
Qty: 1 INHALER | Refills: 11 | Status: SHIPPED | OUTPATIENT
Start: 2021-07-01

## 2021-07-01 RX ORDER — ALBUTEROL SULFATE 90 UG/1
2 AEROSOL, METERED RESPIRATORY (INHALATION) EVERY 6 HOURS PRN
Qty: 18 G | Refills: 11 | Status: SHIPPED | OUTPATIENT
Start: 2021-07-01

## 2021-07-01 NOTE — PROGRESS NOTES
37 years and according to patient she always smoked less than 1 pack/day. December 2019 she started having cough, cough had episodes she claimed that she had cough in the past also but it was never that bad and had been gradually getting worse she get episodes of cough that she feels like that she is having fits she never passed out or syncopal episode but sometimes she feels like because of violent episodes of cough. Cough is mostly dry and not productive of sputum denies purulent sputum denies hemoptysis. Sometimes she feels that she get chest pain and ribs pains because of cough. She also have wheezing wheezing has been worsening for last few months and also associated with shortness of breath. She denies nocturnal awakening with cough wheezing chest tightness or shortness of breath. All the symptoms are worse especially since March for last 3 months or so with worsening cough wheezing and shortness of breath. She denies symptoms of acid reflux or heartburn. She denies postnasal drip nasal congestion or epistaxis. Denies dysphagia no joint pain no stiffness of joints and morning. She had pulmonary function test done which was normal spirometry but airway trapping is evident by increased residual volume and increased total lung capacity. She does have history of snoring, unrefreshed sleep sometimes tiredness and fatigue during the daytime waking up with a snoring. 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    Past Medical History:        Diagnosis Date    Anxiety 2012    Broken bones 1985    Depression 2012    Gallbladder & bile duct stone with obstruction 2000    Hyperlipidemia 2012    Hypertension 2012    Numbness and tingling     generalized    Stroke (cerebrum) ([address] 2018       Past Surgical History:        Procedure Laterality Date    ABDOMEN SURGERY      repair of bile duct.      CHOLECYSTECTOMY      COLONOSCOPY      ENDOSCOPY, COLON, DIAGNOSTIC      HERNIA REPAIR  2015    HYSTERECTOMY      uterine prolapse, ovaries remain.     IA COLON CA SCRN NOT HI RSK IND N/A 9/25/2018    -normal    TONSILLECTOMY AND ADENOIDECTOMY      UPPER GASTROINTESTINAL ENDOSCOPY  07/09/2020    UPPER GASTROINTESTINAL ENDOSCOPY N/A 7/9/2020    EGD BIOPSY performed by Silvestre Merino MD at [address]       Allergies:    No Known Allergies      Home Meds:   Outpatient Encounter Medications as of 7/1/2021   Medication Sig Dispense Refill    omeprazole (PRILOSEC) 20 MG delayed release capsule TAKE ONE CAPSULE BY MOUTH DAILY 90 capsule 1    dilTIAZem (TIAZAC) 180 MG extended release capsule TAKE ONE CAPSULE BY MOUTH DAILY 90 capsule 1    SPIRIVA HANDIHALER 18 MCG inhalation capsule INHALE THE ENTIRE CONTENTS OF 1 CAPSULE ONCE A DAY USING HANDIHALER DEVICE 30 capsule 2    albuterol sulfate  (90 Base) MCG/ACT inhaler INHALE TWO PUFFS BY MOUTH FOUR TIMES A DAY AS NEEDED FOR WHEEZING 18 g 3    SYMBICORT 160-4.5 MCG/ACT AERO INHALE TWO PUFFS BY MOUTH TWICE A DAY 10.2 g 2    albuterol (PROVENTIL) (2.5 MG/3ML) 0.083% nebulizer solution Take 3 mLs by nebulization every 4 hours as needed for Wheezing or Shortness of Breath 120 each 3    aspirin 81 MG chewable tablet Take 81 mg by mouth daily      atenolol (TENORMIN) 25 MG tablet TAKE ONE TABLET BY MOUTH DAILY (Patient not taking: Reported on 6/18/2021) 90 tablet 3    fluticasone (FLONASE) 50 MCG/ACT nasal spray 1 spray by Each Nostril route daily (Patient not taking: Reported on 6/18/2021) 1 Bottle 0    buPROPion (WELLBUTRIN XL) 150 MG extended release tablet TAKE ONE TABLET BY MOUTH EVERY MORNING (Patient not taking: Reported on 3/26/2021) 90 tablet 3    metFORMIN (GLUCOPHAGE) 500 MG tablet TAKE ONE TABLET BY MOUTH TWICE A DAY WITH MEALS (Patient not taking: Reported on 3/26/2021) 180 tablet 3    atorvastatin (LIPITOR) 40 MG tablet TAKE ONE TABLET BY MOUTH ONCE NIGHTLY (Patient not taking: Reported on 3/26/2021) 90 tablet 3    Respiratory Therapy Supplies (NEBULIZER/TUBING/MOUTHPIECE) KIT 1 kit by Does not apply route daily as needed (wheezing, sob) 1 kit 0    sertraline (ZOLOFT) 50 MG tablet Take 1 tablet by mouth daily (Patient not taking: Reported on 3/26/2021) 90 tablet 3     No facility-administered encounter medications on file as of 7/1/2021. Social History:   TOBACCO:   reports that she quit smoking about 20 months ago. She has a 30.00 pack-year smoking history. She has never used smokeless tobacco.  ETOH:   reports no history of alcohol use.   OCCUPATION:      Family History:       Problem Relation Age of Onset    Asthma Mother     Cancer Mother         Skin    Depression Mother     Hearing Loss Mother     High Cholesterol Mother     High Blood Pressure Mother     Osteoporosis Mother     Stroke Mother        Immunizations:    Immunization History   Administered Date(s) Administered    COVID-19, Moderna, PF, 100mcg/0.5mL 03/09/2021, 04/06/2021    Influenza, Quadv, IM, (6 mo and older Fluzone, Flulaval, Fluarix and 3 yrs and older Afluria) 12/10/2020    Influenza, Quadv, IM, PF (6 mo and older Fluzone, Flulaval, Fluarix, and 3 yrs and older Afluria) 09/05/2018, 10/16/2019    Pneumococcal Polysaccharide ([id number]) 06/04/2019         REVIEW OF SYSTEMS:  CONSTITUTIONAL:  negative for  fevers, chills, sweats, fatigue, anorexia, and weight loss  EYES:  negative for  double vision, blurred vision, dry eyes, eye discharge, visual disturbance, redness, and icterus  HEENT:  negative for  hearing loss, tinnitus, ear drainage, earaches, nasal congestion, epistaxis, sore throat, hoarseness, voice change, and postnasal drip  RESPIRATORY: Positive for mild intermittent dry cough,  occasional wheezing, dyspnea on exertion, negative for cough with sputum,  hemoptysis, chest pain, and pleuritic pain  CARDIOVASCULAR: Positive for dyspnea, negative for  chest pain, palpitations, orthopnea, PND, exertional chest pressure/discomfort, fatigue, membranes moist, pharynx normal without lesions, large tongue, small oropharynx, low hanging soft palate, Mallampati 2  Neck - supple, no significant adenopathy, short and thick neck  Chest - No tachypnea, retractions or cyanosis bilateral symmetrical chest movement, normal resonance on percussion, air entry is present bilaterally and symmetrical, no expiratory wheezing rhonchi or crackles.   Heart - normal rate, regular rhythm, normal S1, S2, no murmurs, rubs, clicks or gallops  Abdomen - soft, nontender, nondistended, no masses or organomegaly  Neurological - alert, oriented, normal speech, no focal findings or movement disorder noted  Extremities - peripheral pulses normal, no pedal edema, no clubbing or cyanosis  Skin - normal coloration and turgor, no rashes, no suspicious skin lesions noted       LABS:    CBC:   WBC   Date Value Ref Range Status   06/03/2020 5.7 3.5 - 11.3 k/uL Final   10/16/2019 5.7 3.5 - 11.3 k/uL Final   10/15/2019 9.8 3.5 - 11.3 k/uL Final     Hemoglobin   Date Value Ref Range Status   06/03/2020 13.7 11.9 - 15.1 g/dL Final   10/16/2019 11.3 (L) 11.9 - 15.1 g/dL Final   10/15/2019 11.4 (L) 11.9 - 15.1 g/dL Final     Platelets   Date Value Ref Range Status   06/03/2020 302 138 - 453 k/uL Final   10/16/2019 209 138 - 453 k/uL Final   10/15/2019 216 138 - 453 k/uL Final     BMP:   Sodium   Date Value Ref Range Status   12/09/2020 136 135 - 144 mmol/L Final   06/03/2020 139 135 - 144 mmol/L Final   10/16/2019 139 135 - 144 mmol/L Final     Potassium   Date Value Ref Range Status   12/09/2020 4.6 3.7 - 5.3 mmol/L Final   06/03/2020 4.1 3.7 - 5.3 mmol/L Final   10/16/2019 3.5 (L) 3.7 - 5.3 mmol/L Final     Chloride   Date Value Ref Range Status   12/09/2020 100 98 - 107 mmol/L Final   06/03/2020 104 98 - 107 mmol/L Final   10/16/2019 103 98 - 107 mmol/L Final     CO2   Date Value Ref Range Status   12/09/2020 28 20 - 31 mmol/L Final   06/03/2020 24 20 - 31 mmol/L Final   10/16/2019 21 20 - 31 mmol/L Final     BUN   Date Value Ref Range Status   12/09/2020 11 8 - 23 mg/dL Final   06/03/2020 16 8 - 23 mg/dL Final   10/16/2019 8 8 - 23 mg/dL Final     CREATININE   Date Value Ref Range Status   12/09/2020 0.85 0.50 - 0.90 mg/dL Final   06/03/2020 0.95 (H) 0.50 - 0.90 mg/dL Final   10/16/2019 0.72 0.50 - 0.90 mg/dL Final     Glucose   Date Value Ref Range Status   12/09/2020 115 (H) 70 - 99 mg/dL Final   06/03/2020 119 (H) 70 - 99 mg/dL Final   10/16/2019 105 (H) 70 - 99 mg/dL Final     Hepatic:     Amylase: No results found for: AMYLASE  Lipase:     CARDIAC ENZYMES: No results found for: CKTOTAL, CKMB, CKMBINDEX, TROPONINI  BNP: No results found for: BNP  Lipids:   Cholesterol   Date Value Ref Range Status   06/03/2020 166 <200 mg/dL Final     Comment:        Cholesterol Guidelines:      <200  Desirable   200-240  Borderline      >240  Undesirable          HDL   Date Value Ref Range Status   06/03/2020 57 >40 mg/dL Final     Comment:        HDL Guidelines:    <40     Undesirable   40-59    Borderline    >59     Desirable            INR:   INR   Date Value Ref Range Status   08/13/2018 1.0 0.9 - 1.2 Final     Thyroid:   TSH   Date Value Ref Range Status   09/06/2018 3.33 0.30 - 5.00 mIU/L Final     Urinalysis:     Cultures:-  -----------------------------------------------------------------    ABGs: No results found for: PHART, PO2ART, CGH0TQU    Pulmonary Functions Testing Results:    06/03/2020: FEV1 2.26 92%, FVC 2.91 92%, FTC1NTR 99%, TLC 6.24 127%, RV 2.84 145% DLCO 25.23 110%    CXR        CT Scans  CT lung screening 06/25/2020  Mediastinum:  The cardiac size is normal. There is no significant   mediastinal, hilar or axillary lymphadenopathy.  The thyroid gland shows no   significant abnormalities.  The esophagus shows no significant abnormalities.       Lungs/Pleura:  Mild paraseptal emphysema.       There are no significant nodules or masses.  No focal consolidation.  No   pleural effusion or pneumothorax.  Central airways unremarkable.       Upper Abdomen:  Cholecystectomy.  No adrenal mass.       Soft Tissues/Bones: No acute abnormality of the bones.  The superficial soft   tissues show no significant abnormalities. Assessment and Plan       ICD-10-CM   1. Chronic cough R05   2. TASHIA (obstructive sleep apnea) G47.33   3. Morbid obesity with BMI of 40.0-44.9, adult ([facility] [address] E66.01   4. History of smoking at least 1 pack per day for at least 30 years Z87.891     Assessment:    Chronic cough is most likely related to asthmatic bronchitis, her pulmonary function test on 06/03/2020 shows normal spirometry but her lung volumes consistent with airway trapping, she did have a CT scan which shows mild paraseptal emphysema and she had long history of smoking no other abnormality seen on the CT scan and no evidence of interstitial lung disease. Started using CPAP on November 9, 2020 last 30 days compliance data is available and she is more compliant but she did not use it for 2 weeks will bring the compliance total down. She is benefiting from CPAP and discussed compliance with CPAP. Plan and recommendation:    Continue Symbicort 160/4.52 puff twice daily may down step Symbicort later by decreasing the dose. Advised to continue Spiriva will change to Spiriva respimat  Compliance with medication and technique of using inhalers discussed with patient. Albuterol to be used as needed. Okay to continue PPI per PCP. Continue CPAP at least 4 hours every night. Compliance with CPAP discussed.   Use humidifier with CPAP  Wt loss is recommended and discussed  Follow good sleep hygeine instructions  Given sleep hygeine instructions  Compliance data reviewed from  To 06/29/2020 to 04/01/2021 and she is compliant with CPAP    Vaccinations recommended for flu in fall  She will get Flu vaccine last year  She had both the doses of covid vaccine  Up to date with vaccinations from pulm perspective   Maintain an active lifestyle   PFT's reviewed from June 2020. She will need low-dose screening CT scan yearly and the next one is due CT scan ordered discussed with patient. Questions answered pertaining to diagnosis and management explained importance of compliance with therapy     Return to clinic in 6 months. It was my pleasure to evaluate Luis Maya today. Please call with questions. Please note that this chart was generated using voice recognition Dragon dictation software. Although every effort was made to ensure the accuracy of this automated transcription, some errors in transcription may have occurred. Deysi Cagle MD, MD             7/1/2021, 10:40 AM      Low Dose CT (LDCT) Lung Screening criteria met   Age 50-69   Pack year smoking >30   Still smoking or less than 15 year since quit   No sign or symptoms of lung cancer   > 11 months since last LDCT     Risks and benefits of lung cancer screening with LDCT scans discussed:    Significance of positive screen - False-positive LDCT results often occur. 95% of all positive results do not lead to a diagnosis of cancer. Usually further imaging can resolve most false-positive results; however, some patients may require invasive procedures. Over diagnosis risk - 10% to 12% of screen-detected lung cancer cases are over diagnosedthat is, the cancer would not have been detected in the patient's lifetime without the screening. Need for follow up screens annually to continue lung cancer screening effectiveness     Risks associated with radiation from annual LDCT- Radiation exposure is about the same as for a mammogram, which is about 1/3 of the annual background radiation exposure from everyday life. Starting screening at age 54 is not likely to increase cancer risk from radiation exposure.     Patients with comorbidities resulting in life expectancy of < 10 years, or that would preclude treatment of an abnormality identified on CT, should not be screened due to lack of benefit.     To obtain maximal benefit from this screening, smoking cessation and long-term abstinence from smoking is critical

## 2021-07-01 NOTE — PATIENT INSTRUCTIONS
SURVEY:    You may be receiving a survey from [employer] regarding your visit today. Please complete the survey to enable us to provide the highest quality of care to you and your family. If you cannot score us a very good on any question, please call the office to discuss how we could have made your experience a very good one. Thank you. What is lung cancer screening? Lung cancer screening is a way in which doctors check the lungs for early signs of cancer in people who have no symptoms of lung cancer. A low-dose CT scan uses much less radiation than a normal CT scan and shows a more detailed image of the lungs than a standard X-ray. The goal of lung cancer screening is to find cancer early, before it has a chance to grow, spread, or cause problems. One large study found that smokers who were screened with low-dose CT scans were less likely to die of lung cancer than those who were screened with standard X-ray. Below is a summary of the things you need to know regarding screening for lung cancer with low-dose computed tomography (LDCT). This is a screening program that involves routine annual screening with LDCT studies of the lung. The LDCTs are done using low-dose radiation that is not thought to increase your cancer risk. If you have other serious medical conditions (other cancers, congestive heart failure) that limit your life expectancy to less than 10 years, you should not undergo lung cancer screening with LDCT. The chance is 20%-60% that the LDCT result will show abnormalities. This would require additional testing which could include repeat imaging or even invasive procedures. Most (about 95%) of \"abnormal\" LDCT results are false in the sense that no lung cancer is ultimately found. Additionally, some (about 10%) of the cancers found would not affect your life expectancy, even if undetected and untreated.   If you are still smoking, the single most important thing that you can do to reduce your risk of dying of lung cancer is to quit. For this screening to be covered by Medicare and most other insurers, strict criteria must be met. If you do not meet these criteria, but still wish to undergo LDCT testing, you will be required to sign a waiver indicating your willingness to pay for the scan.

## 2021-07-02 ENCOUNTER — TELEPHONE (OUTPATIENT)
Dept: PRIMARY CARE CLINIC | Age: 61
End: 2021-07-02

## 2021-07-02 NOTE — TELEPHONE ENCOUNTER
Called and spoke with patient and reminded her to have her labs done that Ele had ordered. Verbalizes understanding.

## 2021-07-12 ENCOUNTER — HOSPITAL ENCOUNTER (OUTPATIENT)
Dept: CT IMAGING | Age: 61
Discharge: HOME | End: 2021-07-14
Payer: MEDICARE

## 2021-07-12 DIAGNOSIS — Z87.891 PERSONAL HISTORY OF TOBACCO USE: ICD-10-CM

## 2021-07-12 PROCEDURE — 71271 CT THORAX LUNG CANCER SCR C-: CPT

## 2021-07-13 ENCOUNTER — TELEPHONE (OUTPATIENT)
Dept: PRIMARY CARE CLINIC | Age: 61
End: 2021-07-13

## 2021-07-13 NOTE — TELEPHONE ENCOUNTER
Patient has been notified x 3 to have labs done. No upcoming appt this year. Labs extended until end of the year.

## 2021-07-19 ENCOUNTER — TELEPHONE (OUTPATIENT)
Dept: PRIMARY CARE CLINIC | Age: 61
End: 2021-07-19

## 2021-07-19 NOTE — TELEPHONE ENCOUNTER
Patient has had 3 reminders to have labs done. No appt scheduled. Will extend labs till end of year.

## 2021-11-15 ENCOUNTER — HOSPITAL ENCOUNTER (EMERGENCY)
Age: 61
Discharge: HOME | End: 2021-11-15
Attending: EMERGENCY MEDICINE
Payer: MEDICARE

## 2021-11-15 ENCOUNTER — APPOINTMENT (OUTPATIENT)
Dept: GENERAL RADIOLOGY | Age: 61
End: 2021-11-15
Payer: MEDICARE

## 2021-11-15 VITALS
DIASTOLIC BLOOD PRESSURE: 78 MMHG | HEART RATE: 70 BPM | OXYGEN SATURATION: 96 % | RESPIRATION RATE: 18 BRPM | TEMPERATURE: 97.6 F | SYSTOLIC BLOOD PRESSURE: 138 MMHG

## 2021-11-15 DIAGNOSIS — J44.1 COPD EXACERBATION (HCC): Primary | ICD-10-CM

## 2021-11-15 LAB
SARS-COV-2, RAPID: NOT DETECTED
SPECIMEN DESCRIPTION: NORMAL

## 2021-11-15 PROCEDURE — 87635 SARS-COV-2 COVID-19 AMP PRB: CPT

## 2021-11-15 PROCEDURE — 6370000000 HC RX 637 (ALT 250 FOR IP): Performed by: EMERGENCY MEDICINE

## 2021-11-15 PROCEDURE — 94640 AIRWAY INHALATION TREATMENT: CPT

## 2021-11-15 PROCEDURE — 99283 EMERGENCY DEPT VISIT LOW MDM: CPT

## 2021-11-15 PROCEDURE — 71045 X-RAY EXAM CHEST 1 VIEW: CPT

## 2021-11-15 PROCEDURE — 94664 DEMO&/EVAL PT USE INHALER: CPT

## 2021-11-15 RX ORDER — BENZONATATE 100 MG/1
100 CAPSULE ORAL 3 TIMES DAILY PRN
Qty: 20 CAPSULE | Refills: 0 | Status: SHIPPED | OUTPATIENT
Start: 2021-11-15 | End: 2021-11-22

## 2021-11-15 RX ORDER — AZITHROMYCIN 250 MG/1
TABLET, FILM COATED ORAL
Qty: 1 PACKET | Refills: 0 | Status: SHIPPED | OUTPATIENT
Start: 2021-11-15 | End: 2021-11-19

## 2021-11-15 RX ORDER — IPRATROPIUM BROMIDE AND ALBUTEROL SULFATE 2.5; .5 MG/3ML; MG/3ML
1 SOLUTION RESPIRATORY (INHALATION) ONCE
Status: COMPLETED | OUTPATIENT
Start: 2021-11-15 | End: 2021-11-15

## 2021-11-15 RX ORDER — PREDNISONE 50 MG/1
50 TABLET ORAL DAILY
Qty: 3 TABLET | Refills: 0 | Status: SHIPPED | OUTPATIENT
Start: 2021-11-15 | End: 2021-11-18

## 2021-11-15 RX ADMIN — IPRATROPIUM BROMIDE AND ALBUTEROL SULFATE 1 AMPULE: .5; 3 SOLUTION RESPIRATORY (INHALATION) at 16:25

## 2021-11-15 ASSESSMENT — PAIN SCALES - GENERAL: PAINLEVEL_OUTOF10: 8

## 2021-11-15 NOTE — ED PROVIDER NOTES
[facility] ED  EMERGENCY DEPARTMENT ENCOUNTER      Pt Name: Danish Sapp  MRN: [id number]  [place] 1960  Date of evaluation: 11/15/2021  Provider: David Orozco MD    CHIEF COMPLAINT       Chief Complaint   Patient presents with    Cough     onset 4 days. HISTORY OF PRESENT ILLNESS   (Location/Symptom, Timing/Onset, Context/Setting, Quality, Duration, Modifying Factors, Severity)  Note limiting factors. Danish Sapp is a 57 y.o. female who presents to the emergency department     58 yo female with cough x 4 days. Cough is productive. H/o tobacco use asthma/COPD. No fevers. No known sick contacts. Using  Albuterol at home with no improvement. No chest pain. No Dyspnea. Nursing Notes were reviewed. REVIEW OF SYSTEMS    (2-9 systems for level 4, 10 or more for level 5)     Review of Systems   All other systems reviewed and are negative. Except as noted above the remainder of the review of systems was reviewed and negative. PAST MEDICAL HISTORY     Past Medical History:   Diagnosis Date    Anxiety 2012    Broken bones 1985    Depression 2012    Gallbladder & bile duct stone with obstruction 2000    Hyperlipidemia 2012    Hypertension 2012    Numbness and tingling     generalized    Stroke (cerebrum) ([facility] [address] 2018         SURGICAL HISTORY       Past Surgical History:   Procedure Laterality Date    ABDOMEN SURGERY      repair of bile duct.  CHOLECYSTECTOMY      COLONOSCOPY      ENDOSCOPY, COLON, DIAGNOSTIC      HERNIA REPAIR  2015    HYSTERECTOMY      uterine prolapse, ovaries remain.     [place] COLON CA SCRN NOT HI RSK IND N/A 9/25/2018    -normal    TONSILLECTOMY AND ADENOIDECTOMY      UPPER GASTROINTESTINAL ENDOSCOPY  07/09/2020    UPPER GASTROINTESTINAL ENDOSCOPY N/A 7/9/2020    EGD BIOPSY performed by Nadya Harris MD at [facility]       Discharge Medication List as of 11/15/2021  5:26 PM      CONTINUE these medications which have NOT CHANGED    Details   sertraline (ZOLOFT) 50 MG tablet TAKE ONE TABLET BY MOUTH DAILY, Disp-90 tablet, R-3Normal      albuterol sulfate  (90 Base) MCG/ACT inhaler Inhale 2 puffs into the lungs every 6 hours as needed for Wheezing or Shortness of Breath, Disp-18 g, R-11Normal      budesonide-formoterol (SYMBICORT) 160-4.5 MCG/ACT AERO Inhale 2 puffs into the lungs 2 times daily, Disp-1 Inhaler, R-11Normal      tiotropium (SPIRIVA RESPIMAT) 2.5 MCG/ACT AERS inhaler Inhale 2 puffs into the lungs daily, Disp-1 Inhaler, R-11Normal      omeprazole (PRILOSEC) 20 MG delayed release capsule TAKE ONE CAPSULE BY MOUTH DAILY, Disp-90 capsule, R-1Normal      dilTIAZem (TIAZAC) 180 MG extended release capsule TAKE ONE CAPSULE BY MOUTH DAILY, Disp-90 capsule, R-1Normal      atenolol (TENORMIN) 25 MG tablet TAKE ONE TABLET BY MOUTH DAILY, Disp-90 tablet, R-3Normal      fluticasone (FLONASE) 50 MCG/ACT nasal spray 1 spray by Each Nostril route daily, Disp-1 Bottle, R-0Normal      buPROPion (WELLBUTRIN XL) 150 MG extended release tablet TAKE ONE TABLET BY MOUTH EVERY MORNING, Disp-90 tablet,R-3Normal      Respiratory Therapy Supplies (NEBULIZER/TUBING/MOUTHPIECE) KIT DAILY PRN Starting Thu 2/13/2020, Disp-1 kit, R-0, Print      albuterol (PROVENTIL) (2.5 MG/3ML) 0.083% nebulizer solution Take 3 mLs by nebulization every 4 hours as needed for Wheezing or Shortness of Breath, Disp-120 each, R-3Normal      aspirin 81 MG chewable tablet Take 81 mg by mouth dailyHistorical Med      atorvastatin (LIPITOR) 40 MG tablet TAKE ONE TABLET BY MOUTH ONCE NIGHTLY, Disp-90 tablet,R-3Normal             ALLERGIES     Patient has no known allergies.     FAMILY HISTORY       Family History   Problem Relation Age of Onset    Asthma Mother     Cancer Mother         Skin    Depression Mother     Hearing Loss Mother     High Cholesterol Mother     High Blood Pressure Mother     Osteoporosis Mother     Stroke Mother SOCIAL HISTORY       Social History     Socioeconomic History    Marital status:      Spouse name: None    Number of children: None    Years of education: None    Highest education level: None   Occupational History    None   Tobacco Use    Smoking status: Former Smoker     Packs/day: 0.75     Years: 40.00     Pack years: 30.00     Quit date: 10/8/2019     Years since quittin.1    Smokeless tobacco: Never Used    Tobacco comment: 4 cigarettes daily currently   Vaping Use    Vaping Use: Never used   Substance and Sexual Activity    Alcohol use: No     Comment: Former quit 2018    Drug use: No    Sexual activity: Not Currently   Other Topics Concern    None   Social History Narrative    None     Social Determinants of Health     Financial Resource Strain: Low Risk     Difficulty of Paying Living Expenses: Not hard at all   Food Insecurity: Unknown    Worried About [address] in the Last Year: Patient refused   [address] in the Last Year: Patient refused   Transportation Needs: Unknown    Lack of Transportation (Medical): Patient refused    Lack of Transportation (Non-Medical): Patient refused   Physical Activity:     Days of Exercise per Week: Not on file   [employer] of Exercise per Session: Not on file   Stress:     Feeling of Stress : Not on file   Social Connections:     Frequency of Communication with Friends and Family: Not on file    Frequency of Social Gatherings with Friends and Family: Not on file    Attends Christian Services: Not on file    Active Member of [address] [place] [address] or Organizations: Not on file    Attends Club or Organization Meetings: Not on file    Marital Status: Not on file   Intimate Partner Violence:     Fear of Current or Ex-Partner: Not on file    Emotionally Abused: Not on file    Physically Abused: Not on file    Sexually Abused: Not on file   Housing Stability:     Unable to Pay for Housing in the Last Year: Not on file    Number of Places Lived in the Last Year: Not on file    Unstable Housing in the Last Year: Not on file       SCREENINGS                        PHYSICAL EXAM    (up to 7 for level 4, 8 or more for level 5)     ED Triage Vitals [11/15/21 1535]   BP Temp Temp Source Pulse Resp SpO2 Height Weight   138/78 97.6 °F (36.4 °C) Tympanic 70 18 96 % -- --       Physical Exam  Vitals and nursing note reviewed. Constitutional:       General: She is not in acute distress. Appearance: She is not toxic-appearing. HENT:      Head: Normocephalic and atraumatic. Cardiovascular:      Rate and Rhythm: Normal rate and regular rhythm. Pulmonary:      Effort: Pulmonary effort is normal.      Comments: Diminished breath sounds and wheezing bilaterally. No acute distress  Musculoskeletal:         General: Normal range of motion. Cervical back: Normal range of motion. Skin:     General: Skin is warm and dry. Findings: No rash. Neurological:      General: No focal deficit present. Mental Status: She is alert and oriented to person, place, and time. DIAGNOSTIC RESULTS     EKG: All EKG's are interpreted by the Emergency Department Physician who either signs or Co-signs this chart in the absence of a cardiologist.        RADIOLOGY:   Non-plain film images such as CT, Ultrasound and MRI are read by the radiologist. Plain radiographic images are visualized and preliminarily interpreted by the emergency physician with the below findings:        Interpretation per the Radiologist below, if available at the time of this note:    XR CHEST PORTABLE   Final Result   Linear left basilar opacity could represent subsegmental atelectasis or   infection               ED BEDSIDE ULTRASOUND:   Performed by ED Physician - none    LABS:  Labs Reviewed   COVID-19, RAPID       All other labs were within normal range or not returned as of this dictation.     EMERGENCY DEPARTMENT COURSE and DIFFERENTIAL DIAGNOSIS/MDM:   Vitals: mis-transcribed.)    Tawnya Munguia MD (electronically signed)  Attending Emergency Physician           Tawnya Munguia MD  11/16/21 0066

## 2021-11-16 ENCOUNTER — TELEPHONE (OUTPATIENT)
Dept: PRIMARY CARE CLINIC | Age: 61
End: 2021-11-16

## 2021-11-16 NOTE — TELEPHONE ENCOUNTER
[address] [facility] Initial Follow Up Call    Outreach made within 2 business days of discharge: Yes    Patient: Onofre Glasgow Patient : 1960   MRN: [id number]  Reason for Admission: There are no discharge diagnoses documented for the most recent discharge. Discharge Date: 11/15/21       Spoke with: Gabrielle Magana    Discharge department/facility: [facility] ER    TCM Interactive Patient Contact:  Was patient able to fill all prescriptions: Yes  Was patient instructed to bring all medications to the follow-up visit: Yes  Is patient taking all medications as directed in the discharge summary?  Yes  Does patient understand their discharge instructions: Yes  Does patient have questions or concerns that need addressed prior to 7-14 day follow up office visit: no, will call if needed    Scheduled appointment with PCP within 7-14 days    Follow Up  Future Appointments   Date Time Provider Ryanne Mark   2021  3:30  Max Gimenez. [facility]   2022 11:00 AM MD LENNY SernaF [facility]       Rachael Heller

## 2021-12-20 ENCOUNTER — HOSPITAL ENCOUNTER (OUTPATIENT)
Dept: WOMENS IMAGING | Age: 61
Discharge: HOME | End: 2021-12-22
Payer: MEDICARE

## 2021-12-20 DIAGNOSIS — Z12.31 SCREENING MAMMOGRAM, ENCOUNTER FOR: ICD-10-CM

## 2021-12-20 PROCEDURE — 77063 BREAST TOMOSYNTHESIS BI: CPT

## 2021-12-21 ENCOUNTER — TELEPHONE (OUTPATIENT)
Dept: PRIMARY CARE CLINIC | Age: 61
End: 2021-12-21

## 2021-12-21 NOTE — TELEPHONE ENCOUNTER
----- Message from [address] [place] [address], OLIVER - CNP sent at 12/20/2021  5:10 PM EST -----  Results are normal, please call patient and make them aware.

## 2021-12-26 DIAGNOSIS — I10 ESSENTIAL HYPERTENSION: ICD-10-CM

## 2021-12-27 RX ORDER — DILTIAZEM HYDROCHLORIDE 180 MG/1
CAPSULE, EXTENDED RELEASE ORAL
Qty: 90 CAPSULE | Refills: 1 | Status: SHIPPED | OUTPATIENT
Start: 2021-12-27 | End: 2022-08-04

## 2021-12-30 ENCOUNTER — TELEPHONE (OUTPATIENT)
Dept: PRIMARY CARE CLINIC | Age: 61
End: 2021-12-30

## 2021-12-30 NOTE — TELEPHONE ENCOUNTER
Contacted patient in regards to open lab orders. Informed patient to complete labs ASAP and orders are at registration in the hospital. Patient verbalized understanding.

## 2022-01-04 ENCOUNTER — OFFICE VISIT (OUTPATIENT)
Dept: PRIMARY CARE CLINIC | Age: 62
End: 2022-01-04
Payer: MEDICARE

## 2022-01-04 VITALS
WEIGHT: 247.4 LBS | DIASTOLIC BLOOD PRESSURE: 70 MMHG | BODY MASS INDEX: 45.53 KG/M2 | HEART RATE: 92 BPM | HEIGHT: 62 IN | OXYGEN SATURATION: 96 % | TEMPERATURE: 97.6 F | RESPIRATION RATE: 18 BRPM | SYSTOLIC BLOOD PRESSURE: 122 MMHG

## 2022-01-04 DIAGNOSIS — F34.1 DYSTHYMIA: ICD-10-CM

## 2022-01-04 DIAGNOSIS — I10 ESSENTIAL HYPERTENSION: Primary | ICD-10-CM

## 2022-01-04 DIAGNOSIS — Z23 NEED FOR INFLUENZA VACCINATION: ICD-10-CM

## 2022-01-04 DIAGNOSIS — E78.2 MIXED HYPERLIPIDEMIA: ICD-10-CM

## 2022-01-04 PROCEDURE — 90674 CCIIV4 VAC NO PRSV 0.5 ML IM: CPT | Performed by: NURSE PRACTITIONER

## 2022-01-04 PROCEDURE — G8427 DOCREV CUR MEDS BY ELIG CLIN: HCPCS | Performed by: NURSE PRACTITIONER

## 2022-01-04 PROCEDURE — 99214 OFFICE O/P EST MOD 30 MIN: CPT | Performed by: NURSE PRACTITIONER

## 2022-01-04 PROCEDURE — G8482 FLU IMMUNIZE ORDER/ADMIN: HCPCS | Performed by: NURSE PRACTITIONER

## 2022-01-04 PROCEDURE — G8417 CALC BMI ABV UP PARAM F/U: HCPCS | Performed by: NURSE PRACTITIONER

## 2022-01-04 PROCEDURE — 1036F TOBACCO NON-USER: CPT | Performed by: NURSE PRACTITIONER

## 2022-01-04 PROCEDURE — 90471 IMMUNIZATION ADMIN: CPT | Performed by: NURSE PRACTITIONER

## 2022-01-04 PROCEDURE — 3017F COLORECTAL CA SCREEN DOC REV: CPT | Performed by: NURSE PRACTITIONER

## 2022-01-04 RX ORDER — BUPROPION HYDROCHLORIDE 300 MG/1
TABLET ORAL
Qty: 90 TABLET | Refills: 1 | Status: SHIPPED | OUTPATIENT
Start: 2022-01-04 | End: 2022-09-15

## 2022-01-04 RX ORDER — SERTRALINE HYDROCHLORIDE 100 MG/1
TABLET, FILM COATED ORAL
Qty: 90 TABLET | Refills: 1 | Status: SHIPPED | OUTPATIENT
Start: 2022-01-04 | End: 2022-09-15

## 2022-01-04 ASSESSMENT — ENCOUNTER SYMPTOMS
SORE THROAT: 0
DIARRHEA: 0
RHINORRHEA: 0
CONSTIPATION: 0
NAUSEA: 0
SHORTNESS OF BREATH: 0
ABDOMINAL PAIN: 0
ORTHOPNEA: 0
COUGH: 0
BLURRED VISION: 0
VISUAL CHANGE: 0
VOMITING: 0
WHEEZING: 0

## 2022-01-04 ASSESSMENT — PATIENT HEALTH QUESTIONNAIRE - PHQ9
7. TROUBLE CONCENTRATING ON THINGS, SUCH AS READING THE NEWSPAPER OR WATCHING TELEVISION: 0
1. LITTLE INTEREST OR PLEASURE IN DOING THINGS: 3
9. THOUGHTS THAT YOU WOULD BE BETTER OFF DEAD, OR OF HURTING YOURSELF: 0
10. IF YOU CHECKED OFF ANY PROBLEMS, HOW DIFFICULT HAVE THESE PROBLEMS MADE IT FOR YOU TO DO YOUR WORK, TAKE CARE OF THINGS AT HOME, OR GET ALONG WITH OTHER PEOPLE: 1
6. FEELING BAD ABOUT YOURSELF - OR THAT YOU ARE A FAILURE OR HAVE LET YOURSELF OR YOUR FAMILY DOWN: 3
3. TROUBLE FALLING OR STAYING ASLEEP: 3
4. FEELING TIRED OR HAVING LITTLE ENERGY: 3
SUM OF ALL RESPONSES TO PHQ QUESTIONS 1-9: 18
SUM OF ALL RESPONSES TO PHQ QUESTIONS 1-9: 18
8. MOVING OR SPEAKING SO SLOWLY THAT OTHER PEOPLE COULD HAVE NOTICED. OR THE OPPOSITE, BEING SO FIGETY OR RESTLESS THAT YOU HAVE BEEN MOVING AROUND A LOT MORE THAN USUAL: 0
SUM OF ALL RESPONSES TO PHQ9 QUESTIONS 1 & 2: 6
SUM OF ALL RESPONSES TO PHQ QUESTIONS 1-9: 18
2. FEELING DOWN, DEPRESSED OR HOPELESS: 3
5. POOR APPETITE OR OVEREATING: 3
SUM OF ALL RESPONSES TO PHQ QUESTIONS 1-9: 18

## 2022-01-04 NOTE — PATIENT INSTRUCTIONS
SURVEY:     You may be receiving a survey from [email or web address] regarding your visit today. Please complete the survey to enable us to provide the highest quality of care to you and your family. If you cannot score us a very good on any question, please call the office to discuss how we could have made your experience a very good one. Thank you.   Ankit Salgado, APRN-JONATHAN Ewing, JONATHAN Waterman, LPN  Kanchan Thorne, LPN  David Guerra, HIRAM Stone, MARIBEL Knight, CMA  Sofia, PCA

## 2022-01-04 NOTE — PROGRESS NOTES
Name: Ruma Lopez  : 1960         Chief Complaint:     Chief Complaint   Patient presents with   [address] Problem     pt states it is getting worse       History of Present Illness:      Ruma Lopez is a 57 y.o.  female who presents with Mental Health Problem (pt states it is getting worse)      Navya Rivera is here today for a routine office visit. Dysthymia- worsened, states under stress by caring for her elderly mother and mother-in-law, states she feels her medications need to be increased. Hypertension  This is a chronic problem. The current episode started more than 1 year ago. The problem is unchanged. The problem is controlled. Associated symptoms include anxiety (Improved). Pertinent negatives include no blurred vision, chest pain, headaches, malaise/fatigue, neck pain, orthopnea, palpitations, peripheral edema, PND, shortness of breath or sweats. There are no associated agents to hypertension. Risk factors for coronary artery disease include dyslipidemia, family history, post-menopausal state, obesity, stress and smoking/tobacco exposure. Past treatments include calcium channel blockers. The current treatment provides moderate improvement. Compliance problems include exercise and diet. There is no history of kidney disease, CAD/MI, CVA or heart failure. There is no history of chronic renal disease. Mental Health Problem  The primary symptoms include dysphoric mood. The primary symptoms do not include delusions, hallucinations, bizarre behavior, disorganized speech, negative symptoms or somatic symptoms. Primary symptoms comment: ANXIETY. The current episode started more than 1 month ago. This is a chronic problem. The onset of the illness is precipitated by emotional stress. The degree of incapacity that she is experiencing as a consequence of her illness is mild. Sequelae of the illness include harmed interpersonal relations.  Additional symptoms of the illness include anhedonia, insomnia, fatigue, agitation, attention impairment and distractible. Additional symptoms of the illness do not include hypersomnia, appetite change, unexpected weight change, psychomotor retardation, feelings of worthlessness, euphoric mood, increased goal-directed activity, flight of ideas, inflated self-esteem, decreased need for sleep, poor judgment, visual change, headaches, abdominal pain or seizures. She does not admit to suicidal ideas. She does not have a plan to attempt suicide. She does not contemplate harming herself. She has not already injured self. She does not contemplate injuring another person. She has not already  injured another person. Risk factors that are present for mental illness include a history of mental illness and a family history of mental illness. Hyperlipidemia  This is a chronic problem. The current episode started more than 1 year ago. The problem is controlled. Recent lipid tests were reviewed and are normal. Exacerbating diseases include obesity. She has no history of chronic renal disease or liver disease. Factors aggravating her hyperlipidemia include smoking and fatty foods. Pertinent negatives include no chest pain, leg pain, myalgias or shortness of breath. Improvement on treatment: UNKNOWN, NEEDS CURRENT LABS. Compliance problems include adherence to exercise and adherence to diet. Risk factors for coronary artery disease include dyslipidemia, family history, obesity, hypertension, post-menopausal, a sedentary lifestyle and stress.          Past Medical History:     Past Medical History:   Diagnosis Date    Anxiety 2012    Broken bones 1985    Depression 2012    Gallbladder & bile duct stone with obstruction 2000    Hyperlipidemia 2012    Hypertension 2012    Numbness and tingling     generalized    Stroke (cerebrum) ([facility] [address] 2018      Reviewed all health maintenance requirements and ordered appropriate tests  Health Maintenance Due   Topic Date Due    Depression Monitoring  Never done    Lipid screen  06/03/2021       Past Surgical History:     Past Surgical History:   Procedure Laterality Date    ABDOMEN SURGERY      repair of bile duct.  CHOLECYSTECTOMY      COLONOSCOPY      ENDOSCOPY, COLON, DIAGNOSTIC      HERNIA REPAIR  2015    HYSTERECTOMY      uterine prolapse, ovaries remain.  [place] COLON CA SCRN NOT HI RSK IND N/A 9/25/2018    -normal    TONSILLECTOMY AND ADENOIDECTOMY      UPPER GASTROINTESTINAL ENDOSCOPY  07/09/2020    UPPER GASTROINTESTINAL ENDOSCOPY N/A 7/9/2020    EGD BIOPSY performed by Mona Caicedo MD at [facility]        Medications:       Prior to Admission medications    Medication Sig Start Date End Date Taking?  Authorizing Provider   buPROPion (WELLBUTRIN XL) 300 MG extended release tablet TAKE ONE TABLET BY MOUTH EVERY MORNING 1/4/22  Yes OLIVER Mendez - CNP   sertraline (ZOLOFT) 100 MG tablet TAKE ONE TABLET BY MOUTH DAILY 1/4/22  Yes OLIVER Mendez - CNP   dilTIAZem (TIAZAC) 180 MG extended release capsule TAKE ONE CAPSULE BY MOUTH DAILY 12/27/21  Yes OLIVER Mendez - CNP   albuterol (PROVENTIL) (2.5 MG/3ML) 0.083% nebulizer solution USE THREE MILLILITERS VIA NEBULIZATION BY MOUTH EVERY 4 HOURS AS NEEDED FOR WHEEZING OR FOR SHORTNESS OF BREATH 11/30/21  Yes OLIVER Mendez CNP   albuterol sulfate  (90 Base) MCG/ACT inhaler Inhale 2 puffs into the lungs every 6 hours as needed for Wheezing or Shortness of Breath 7/1/21  Yes Alexandria Vega MD   budesonide-formoterol (SYMBICORT) 160-4.5 MCG/ACT AERO Inhale 2 puffs into the lungs 2 times daily 7/1/21  Yes Alexandria Vega MD   tiotropium (SPIRIVA RESPIMAT) 2.5 MCG/ACT AERS inhaler Inhale 2 puffs into the lungs daily 7/1/21  Yes Alexandria Vega MD   omeprazole (PRILOSEC) 20 MG delayed release capsule TAKE ONE CAPSULE BY MOUTH DAILY 6/29/21  Yes Rhianna Salgado APRN - CNP   atenolol (TENORMIN) 25 MG tablet TAKE ONE TABLET BY MOUTH DAILY 6/1/21  Yes Rhianna Vila OLIVER Salgado CNP   Respiratory Therapy Supplies (NEBULIZER/TUBING/MOUTHPIECE) KIT 1 kit by Does not apply route daily as needed (wheezing, sob) 2/13/20  Yes OLIVER Pollack CNP   fluticasone (FLONASE) 50 MCG/ACT nasal spray 1 spray by Each Nostril route daily  Patient not taking: Reported on 1/4/2022 3/26/21   Pauline Garg OLIVER Carlos CNP   atorvastatin (LIPITOR) 40 MG tablet TAKE ONE TABLET BY MOUTH ONCE NIGHTLY  Patient not taking: Reported on 3/26/2021 7/29/20   Saraasya SevillaOLIVER Fontana CNP   aspirin 81 MG chewable tablet Take 81 mg by mouth daily  Patient not taking: Reported on 1/4/2022    Historical Provider, MD        Allergies:       Patient has no known allergies. Social History:     Tobacco:    reports that she quit smoking about 2 years ago. She has a 30.00 pack-year smoking history. She has never used smokeless tobacco.  Alcohol:      reports no history of alcohol use. Drug Use:  reports no history of drug use. Family History:     Family History   Adopted: Yes   Problem Relation Age of Onset    Asthma Mother     Cancer Mother         Skin    Depression Mother     Hearing Loss Mother     High Cholesterol Mother     High Blood Pressure Mother     Osteoporosis Mother     Stroke Mother        Review of Systems:     Positive and Negative as described in HPI    Review of Systems   Constitutional: Positive for fatigue. Negative for appetite change, chills, fever, malaise/fatigue and unexpected weight change. HENT: Negative for congestion, rhinorrhea and sore throat. Eyes: Negative for blurred vision and visual disturbance. Respiratory: Negative for cough, shortness of breath and wheezing. Cardiovascular: Negative for chest pain, palpitations, orthopnea and PND. Gastrointestinal: Negative for abdominal pain, constipation, diarrhea, nausea and vomiting. Genitourinary: Negative for difficulty urinating and dysuria.    Musculoskeletal: Negative for gait problem, myalgias, neck pain and neck stiffness. Skin: Negative for rash. Neurological: Negative for dizziness, seizures, syncope, light-headedness and headaches. Psychiatric/Behavioral: Positive for agitation, decreased concentration, dysphoric mood and sleep disturbance. Negative for behavioral problems, confusion, hallucinations, self-injury and suicidal ideas. The patient is nervous/anxious and has insomnia. The patient is not hyperactive. Physical Exam:   Vitals:  /70 (Position: Sitting)   Pulse 92   Temp 97.6 °F (36.4 °C) (Temporal)   Resp 18   Ht 5' 2\" (1.575 m)   Wt 247 lb 6.4 oz (112.2 kg)   SpO2 96%   BMI 45.25 kg/m²     Physical Exam  Vitals and nursing note reviewed. Constitutional:       General: She is not in acute distress. Appearance: Normal appearance. She is well-developed. She is obese. She is not ill-appearing. HENT:      Mouth/Throat:      Mouth: Mucous membranes are moist.   Eyes:      General: No scleral icterus. Conjunctiva/sclera: Conjunctivae normal.   Cardiovascular:      Rate and Rhythm: Normal rate and regular rhythm. Heart sounds: No murmur heard. Pulmonary:      Effort: Pulmonary effort is normal.      Breath sounds: Normal breath sounds. Abdominal:      General: Bowel sounds are normal. There is no distension. Palpations: Abdomen is soft. Tenderness: There is no abdominal tenderness. Comments: Obese abdomen   Musculoskeletal:      Cervical back: Normal range of motion and neck supple. Right lower leg: No edema. Left lower leg: No edema. Skin:     General: Skin is warm and dry. Findings: No rash. Neurological:      Mental Status: She is alert and oriented to person, place, and time. Psychiatric:         Attention and Perception: Attention normal. She is attentive. Mood and Affect: Mood is anxious. Mood is not depressed. Affect is tearful (at times).          Speech: Speech normal.         Behavior: Behavior normal. Behavior is cooperative. Thought Content: Thought content normal. Thought content does not include homicidal or suicidal ideation. Thought content does not include homicidal or suicidal plan. Cognition and Memory: Cognition and memory normal.         Judgment: Judgment normal.      Comments: Tearful at times         Data:     Lab Results   Component Value Date     12/09/2020    K 4.6 12/09/2020     12/09/2020    CO2 28 12/09/2020    BUN 11 12/09/2020    CREATININE 0.85 12/09/2020    GLUCOSE 115 12/09/2020    PROT 6.6 10/16/2019    LABALBU 3.6 10/16/2019    BILITOT 0.35 10/16/2019    ALKPHOS 75 10/16/2019    AST 15 12/09/2020    ALT 20 12/09/2020     Lab Results   Component Value Date    WBC 5.7 06/03/2020    RBC 4.73 06/03/2020    HGB 13.7 06/03/2020    HCT 44.6 06/03/2020    MCV 94.3 06/03/2020    MCH 29.0 06/03/2020    MCHC 30.7 06/03/2020    RDW 14.3 06/03/2020     06/03/2020    MPV 9.9 06/03/2020     Lab Results   Component Value Date    TSH 3.33 09/06/2018     Lab Results   Component Value Date    CHOL 166 06/03/2020    HDL 57 06/03/2020    LABA1C 5.3 12/10/2020       Assessment/Plan:      Diagnosis Orders   1. Essential hypertension  CBC Auto Differential    ALT    AST    Basic Metabolic Panel   2. Dysthymia  buPROPion (WELLBUTRIN XL) 300 MG extended release tablet    sertraline (ZOLOFT) 100 MG tablet   3. Mixed hyperlipidemia     4. Need for influenza vaccination  INFLUENZA, MDCK QUADV, 2 YRS AND OLDER, IM, PF, PREFILL SYR OR SDV, 0.5ML (FLUCELVAX QUADV, PF)     We will increase sertraline to 100 mg daily, increase bupropion XL to 300 mg daily. Continue all other current medications. Labs are due now, we will follow with results. Call in a few weeks with progress, sooner if no improvement in mood. We will see her back in 6 moths, sooner if any issues. 1.  Renata received counseling on the following healthy behaviors: nutrition, exercise and medication adherence  2.   Patient given educational materials - see patient instructions  3. Was a self-tracking handout given in paper form or via [employer]? No  If yes, see orders or list here. 4.  Discussed use, benefit, and side effects of prescribed medications. Barriers to medication compliance addressed. All patient questions answered. Pt voiced understanding. 5.  Reviewed prior labs and health maintenance  6. Continue current medications, diet and exercise. Completed Refills   Requested Prescriptions     Signed Prescriptions Disp Refills    buPROPion (WELLBUTRIN XL) 300 MG extended release tablet 90 tablet 1     Sig: TAKE ONE TABLET BY MOUTH EVERY MORNING    sertraline (ZOLOFT) 100 MG tablet 90 tablet 1     Sig: TAKE ONE TABLET BY MOUTH DAILY         Return in about 6 months (around 7/4/2022) for Check up.

## 2022-01-04 NOTE — PROGRESS NOTES
Vaccine Information Sheet, \"Influenza - Inactivated\"  given to Americo Peterson, or parent/legal guardian of  Americo Peterson and verbalized understanding. Patient responses:    Have you ever had a reaction to a flu vaccine? No  Are you able to eat eggs without adverse effects? Yes  Do you have any current illness? No  Have you ever had Guillian [place] Syndrome? No    Flu vaccine given per order. Please see immunization tab. After obtaining consent, and per orders of Raj Salgado CNP, injection of Flucelvax Quadrivalent given in Left deltoid by Bertha Triplett. Patient instructed to remain in clinic for 20 minutes afterwards, and to report any adverse reaction to me immediately.

## 2022-01-11 ENCOUNTER — HOSPITAL ENCOUNTER (OUTPATIENT)
Dept: GENERAL RADIOLOGY | Age: 62
Discharge: HOME | End: 2022-01-13
Payer: MEDICARE

## 2022-01-11 ENCOUNTER — HOSPITAL ENCOUNTER (OUTPATIENT)
Age: 62
Discharge: HOME | End: 2022-01-13
Payer: MEDICARE

## 2022-01-11 ENCOUNTER — OFFICE VISIT (OUTPATIENT)
Dept: PRIMARY CARE CLINIC | Age: 62
End: 2022-01-11
Payer: MEDICARE

## 2022-01-11 VITALS
DIASTOLIC BLOOD PRESSURE: 70 MMHG | WEIGHT: 246.4 LBS | HEART RATE: 94 BPM | TEMPERATURE: 97.7 F | RESPIRATION RATE: 22 BRPM | OXYGEN SATURATION: 97 % | SYSTOLIC BLOOD PRESSURE: 132 MMHG | BODY MASS INDEX: 45.07 KG/M2

## 2022-01-11 DIAGNOSIS — J20.9 ACUTE BRONCHITIS WITH COPD (HCC): Primary | ICD-10-CM

## 2022-01-11 DIAGNOSIS — J44.0 ACUTE BRONCHITIS WITH COPD (HCC): Primary | ICD-10-CM

## 2022-01-11 DIAGNOSIS — J20.9 ACUTE BRONCHITIS WITH COPD (HCC): ICD-10-CM

## 2022-01-11 DIAGNOSIS — J44.0 ACUTE BRONCHITIS WITH COPD (HCC): ICD-10-CM

## 2022-01-11 PROCEDURE — 99214 OFFICE O/P EST MOD 30 MIN: CPT | Performed by: NURSE PRACTITIONER

## 2022-01-11 PROCEDURE — 3023F SPIROM DOC REV: CPT | Performed by: NURSE PRACTITIONER

## 2022-01-11 PROCEDURE — 3017F COLORECTAL CA SCREEN DOC REV: CPT | Performed by: NURSE PRACTITIONER

## 2022-01-11 PROCEDURE — G8482 FLU IMMUNIZE ORDER/ADMIN: HCPCS | Performed by: NURSE PRACTITIONER

## 2022-01-11 PROCEDURE — G8427 DOCREV CUR MEDS BY ELIG CLIN: HCPCS | Performed by: NURSE PRACTITIONER

## 2022-01-11 PROCEDURE — 1036F TOBACCO NON-USER: CPT | Performed by: NURSE PRACTITIONER

## 2022-01-11 PROCEDURE — 71046 X-RAY EXAM CHEST 2 VIEWS: CPT

## 2022-01-11 PROCEDURE — G8417 CALC BMI ABV UP PARAM F/U: HCPCS | Performed by: NURSE PRACTITIONER

## 2022-01-11 RX ORDER — PREDNISONE 20 MG/1
TABLET ORAL
Qty: 18 TABLET | Refills: 0 | Status: SHIPPED | OUTPATIENT
Start: 2022-01-11 | End: 2022-01-20

## 2022-01-11 RX ORDER — DOXYCYCLINE HYCLATE 100 MG/1
100 CAPSULE ORAL 2 TIMES DAILY
Qty: 20 CAPSULE | Refills: 0 | Status: SHIPPED | OUTPATIENT
Start: 2022-01-11 | End: 2022-01-21

## 2022-01-11 ASSESSMENT — ENCOUNTER SYMPTOMS
TROUBLE SWALLOWING: 0
NAUSEA: 0
COUGH: 1
VOMITING: 0
SORE THROAT: 0
CONSTIPATION: 0
HEMOPTYSIS: 0
SINUS PRESSURE: 0
ABDOMINAL PAIN: 0
SHORTNESS OF BREATH: 1
WHEEZING: 0
SINUS PAIN: 0
HEARTBURN: 0
RHINORRHEA: 0
DIARRHEA: 0

## 2022-01-11 NOTE — PATIENT INSTRUCTIONS
SURVEY:    You may be receiving a survey from [employer] regarding your visit today. Please complete the survey to enable us to provide the highest quality of care to you and your family. If you cannot score us a very good on any question, please call the office to discuss how we could have made your experience a very good one. Thank you. Patient Education        Chronic Obstructive Pulmonary Disease (COPD): Care Instructions  Overview     Chronic obstructive pulmonary disease (COPD) is a lung disease that makes it hard to breathe. With COPD, the airways that lead to the lungs are narrowed, and the tiny air sacs in the lungs are damaged and lose their stretch. People with COPD have decreased airflow in and out of the lungs, which makes it hard to breathe. The airways also can get clogged with thick mucus. Cigarette smoking is a major cause of COPD. Although there is no cure for COPD, you can slow its progress. Following your treatment plan and taking care of yourself can help you feel better and live longer. Follow-up care is a key part of your treatment and safety. Be sure to make and go to all appointments, and call your doctor if you are having problems. It's also a good idea to know your test results and keep a list of the medicines you take. How can you care for yourself at home? Staying healthy    · Do not smoke. This is the most important step you can take to prevent more damage to your lungs. If you need help quitting, talk to your doctor about stop-smoking programs and medicines. These can increase your chances of quitting for good.     · Avoid colds and other infections. Get the pneumococcal and whooping cough (pertussis) vaccines. If you have had these vaccines before, ask your doctor if you need another dose. Get the flu vaccine every fall. If you must be around people with colds or the flu, wash your hands often.     · Avoid secondhand smoke and air pollution.  Try to stay inside with your windows closed when air pollution is bad. Medicines and oxygen therapy    · Take your medicines exactly as prescribed. Call your doctor if you think you are having a problem with your medicine. You may be taking medicines such as:  ? Bronchodilators. These help open your airways and make breathing easier. They are either short-acting (work for 4 to 9 hours) or long-acting (work for 12 to 24 hours). You inhale most bronchodilators, so they start to act quickly. Always carry your quick-relief inhaler with you in case you need it. ? Corticosteroids (prednisone, budesonide). These reduce airway inflammation. They come in inhaled or pill form.     · Ask your doctor or pharmacist if you are using each type of inhaler correctly. With correct use, the medicine is more likely to get to your lungs.     · See if a spacer is right for you. A spacer may also help you get more inhaled medicine to your lungs. If you use one, ask how to use it properly.     · Do not take any vitamins, over-the-counter medicine, or herbal products without talking to your doctor first.     · If your doctor prescribed antibiotics, take them as directed. Do not stop taking them just because you feel better. You need to take the full course of antibiotics.     · If you use oxygen therapy, use the flow rate your doctor has recommended. Don't change it without talking to your doctor first. Oxygen therapy boosts the amount of oxygen in your blood and helps you breathe easier. Activity    · Get regular exercise. Walking is an easy way to get exercise. Start out slowly, and walk a little more each day.     · Pay attention to your breathing.  You are exercising too hard if you can't talk while you exercise.     · Take short rest breaks when doing household chores and other activities.     · Learn breathing methods--such as breathing through pursed lips--to help you become less short of breath.     · If your doctor has not set you up with a pulmonary rehabilitation program, ask if rehab is right for you. Rehab includes exercise programs, education about your disease and how to manage it, help with diet and other changes, and emotional support. Diet    · Eat regular, healthy meals.     · Use bronchodilators about 1 hour before you eat to make it easier to eat.     · Eat several smaller, frequent meals to prevent getting too full. A full stomach can push on the muscle that helps you breathe (your diaphragm) and make it harder to breathe.     · Drink beverages at the end of the meal.     · Avoid foods that are hard to chew.     · Eat foods that contain protein so you don't lose muscle mass.     · Talk with your doctor if you gain too much weight or if you lose weight without trying. Mental health    · Talk to your family, friends, or a therapist about your feelings. Some people feel frightened, angry, hopeless, helpless, and even guilty. Talking openly about feelings may help you cope. If these feelings last, talk to your doctor. When should you call for help? Call 911 anytime you think you may need emergency care. For example, call if:    · You have severe trouble breathing.     · You are having chest pain that is different or worse than usual.   Call your doctor now or seek immediate medical care if:    · You have new or worse trouble breathing.     · You cough up blood.     · You have a fever.     · You have feelings of anxiety or depression. Watch closely for changes in your health, and be sure to contact your doctor if:    · You cough more deeply or more often, especially if you notice more mucus or a change in the color of your mucus.     · You have new or worse swelling in your legs or belly.     · You are not getting better as expected. Where can you learn more? Go to [email or web address]. org and sign in to your [employer] account.  Enter W265 in the H[employer] box to learn more about \"Chronic Obstructive Pulmonary Disease (COPD): Care Instructions. \"     If you do not have an account, please click on the \"Sign Up Now\" link. Current as of: July 6, 2021               Content Version: 13.1  © 9421-4247 Healthwise, Incorporated. Care instructions adapted under license by [facility] [place] ([facility]). If you have questions about a medical condition or this instruction, always ask your healthcare professional. [address] any warranty or liability for your use of this information.

## 2022-01-11 NOTE — PROGRESS NOTES
Name: Maximiliano Horne  : 1960         Chief Complaint:     Chief Complaint   Patient presents with    Cough     \"making me black out\" X 2 years gets worse in the winter       History of Present Illness:      Maximiliano Horne is a 57 y.o.  female who presents with Cough (\"making me black out\" X 2 years gets worse in the winter)      Catina Noguera is here today for a same day office visit. States she has \"the same cough as last year\". Patient states she coughed so hard she nearly passes out. Her  is here and states that she did pass out once during a coughing fit. See below for further comment. Cough  This is a recurrent problem. The current episode started 1 to 4 weeks ago. The problem has been gradually worsening. The problem occurs constantly. The cough is non-productive. Associated symptoms include shortness of breath. Pertinent negatives include no chest pain, chills, ear congestion, ear pain, fever, headaches, heartburn, hemoptysis, myalgias, nasal congestion, postnasal drip, rash, rhinorrhea, sore throat, sweats, weight loss or wheezing. Nothing aggravates the symptoms. She has tried a beta-agonist inhaler, steroid inhaler and rest for the symptoms. The treatment provided mild relief. Her past medical history is significant for asthma, bronchitis and COPD. There is no history of bronchiectasis, emphysema, environmental allergies or pneumonia.          Past Medical History:     Past Medical History:   Diagnosis Date    Anxiety     Broken bones     Depression     Gallbladder & bile duct stone with obstruction     Hyperlipidemia     Hypertension     Numbness and tingling     generalized    Stroke (cerebrum) ([facility] [address] 2018      Reviewed all health maintenance requirements and ordered appropriate tests  Health Maintenance Due   Topic Date Due    Lipid screen  2021       Past Surgical History:     Past Surgical History:   Procedure Laterality Date    ABDOMEN SURGERY repair of bile duct.  CHOLECYSTECTOMY      COLONOSCOPY      ENDOSCOPY, COLON, DIAGNOSTIC      HERNIA REPAIR  2015    HYSTERECTOMY      uterine prolapse, ovaries remain.  [place] COLON CA SCRN NOT HI RSK IND N/A 9/25/2018    -normal    TONSILLECTOMY AND ADENOIDECTOMY      UPPER GASTROINTESTINAL ENDOSCOPY  07/09/2020    UPPER GASTROINTESTINAL ENDOSCOPY N/A 7/9/2020    EGD BIOPSY performed by Nirav Cagle MD at [facility]        Medications:       Prior to Admission medications    Medication Sig Start Date End Date Taking? Authorizing Provider   predniSONE (DELTASONE) 20 MG tablet Take 3 tablets by mouth daily for 3 days, THEN 2 tablets daily for 3 days, THEN 1 tablet daily for 3 days. 1/11/22 1/20/22 Yes Carolee Glassing Might, APRN - CNP   doxycycline hyclate (VIBRAMYCIN) 100 MG capsule Take 1 capsule by mouth 2 times daily for 10 days 1/11/22 1/21/22 Yes Carolee Glassing Might, APRN - CNP   HYDROcodone-homatropine (HYCODAN) 5-1.5 MG/5ML syrup Take 5 mLs by mouth 4 times daily as needed (cough) for up to 6 days.  1/11/22 1/17/22 Yes Carolee Glassing Might, APRN - CNP   buPROPion (WELLBUTRIN XL) 300 MG extended release tablet TAKE ONE TABLET BY MOUTH EVERY MORNING 1/4/22  Yes Carolee Glassing Might, APRN - CNP   sertraline (ZOLOFT) 100 MG tablet TAKE ONE TABLET BY MOUTH DAILY 1/4/22  Yes Carolee Glassing Might, APRN - CNP   dilTIAZem (TIAZAC) 180 MG extended release capsule TAKE ONE CAPSULE BY MOUTH DAILY 12/27/21  Yes Carolee Glassing Might, APRN - CNP   albuterol (PROVENTIL) (2.5 MG/3ML) 0.083% nebulizer solution USE THREE MILLILITERS VIA NEBULIZATION BY MOUTH EVERY 4 HOURS AS NEEDED FOR WHEEZING OR FOR SHORTNESS OF BREATH 11/30/21  Yes Carolee Glassing Might, APRN - CNP   albuterol sulfate  (90 Base) MCG/ACT inhaler Inhale 2 puffs into the lungs every 6 hours as needed for Wheezing or Shortness of Breath 7/1/21  Yes oMne Layne MD   budesonide-formoterol (SYMBICORT) 160-4.5 MCG/ACT AERO Inhale 2 puffs into the lungs 2 times daily 7/1/21  Yes Gia Dumont MD   tiotropium (SPIRIVA RESPIMAT) 2.5 MCG/ACT AERS inhaler Inhale 2 puffs into the lungs daily 7/1/21  Yes Gia Dumont MD   omeprazole (PRILOSEC) 20 MG delayed release capsule TAKE ONE CAPSULE BY MOUTH DAILY 6/29/21  Yes OLIVER Osman CNP   atenolol (TENORMIN) 25 MG tablet TAKE ONE TABLET BY MOUTH DAILY 6/1/21  Yes OLIVER Osman CNP   Respiratory Therapy Supplies (NEBULIZER/TUBING/MOUTHPIECE) KIT 1 kit by Does not apply route daily as needed (wheezing, sob) 2/13/20  Yes OLIVER Omsan CNP   fluticasone (FLONASE) 50 MCG/ACT nasal spray 1 spray by Each Nostril route daily  Patient not taking: Reported on 1/4/2022 3/26/21   OLIVER Enamorado CNP   atorvastatin (LIPITOR) 40 MG tablet TAKE ONE TABLET BY MOUTH ONCE NIGHTLY  Patient not taking: Reported on 3/26/2021 7/29/20   OLIVER Osman CNP   aspirin 81 MG chewable tablet Take 81 mg by mouth daily  Patient not taking: Reported on 1/4/2022    Historical Provider, MD        Allergies:       Patient has no known allergies. Social History:     Tobacco:    reports that she quit smoking about 2 years ago. She has a 30.00 pack-year smoking history. She has never used smokeless tobacco.  Alcohol:      reports no history of alcohol use. Drug Use:  reports no history of drug use. Family History:     Family History   Adopted: Yes   Problem Relation Age of Onset    Asthma Mother     Cancer Mother         Skin    Depression Mother     Hearing Loss Mother     High Cholesterol Mother     High Blood Pressure Mother     Osteoporosis Mother     Stroke Mother        Review of Systems:     Positive and Negative as described in HPI    Review of Systems   Constitutional: Negative for chills, fatigue, fever and weight loss. HENT: Positive for congestion. Negative for ear pain, postnasal drip, rhinorrhea, sinus pressure, sinus pain, sneezing, sore throat and trouble swallowing. Eyes: Negative for visual disturbance. Respiratory: Positive for cough and shortness of breath. Negative for hemoptysis and wheezing. Cardiovascular: Negative for chest pain, palpitations and leg swelling. Gastrointestinal: Negative for abdominal pain, constipation, diarrhea, heartburn, nausea and vomiting. Genitourinary: Negative for difficulty urinating and dysuria. Musculoskeletal: Negative for gait problem, myalgias, neck pain and neck stiffness. Skin: Negative for rash. Allergic/Immunologic: Negative for environmental allergies. Neurological: Positive for syncope (near) and light-headedness. Negative for dizziness and headaches. Physical Exam:   Vitals:  /70 (Position: Sitting)   Pulse 94   Temp 97.7 °F (36.5 °C) (Temporal)   Resp 22   Wt 246 lb 6.4 oz (111.8 kg)   SpO2 97%   BMI 45.07 kg/m²     Physical Exam  Constitutional:       General: She is not in acute distress. Appearance: Normal appearance. She is obese. She is not ill-appearing. HENT:      Mouth/Throat:      Mouth: Mucous membranes are moist.   Eyes:      General: No scleral icterus. Conjunctiva/sclera: Conjunctivae normal.   Cardiovascular:      Rate and Rhythm: Normal rate and regular rhythm. Heart sounds: No murmur heard. Pulmonary:      Effort: Pulmonary effort is normal.      Breath sounds: Normal breath sounds. No stridor. No wheezing, rhonchi or rales. Abdominal:      General: Bowel sounds are normal. There is no distension. Palpations: Abdomen is soft. Tenderness: There is no abdominal tenderness. Musculoskeletal:      Cervical back: Normal range of motion and neck supple. Right lower leg: No edema. Left lower leg: No edema. Lymphadenopathy:      Cervical: No cervical adenopathy. Skin:     General: Skin is warm and dry. Findings: No rash. Neurological:      Mental Status: She is alert and oriented to person, place, and time. Psychiatric:         Mood and Affect: Mood is anxious. Behavior: Behavior normal.         Data:     Lab Results   Component Value Date     12/09/2020    K 4.6 12/09/2020     12/09/2020    CO2 28 12/09/2020    BUN 11 12/09/2020    CREATININE 0.85 12/09/2020    GLUCOSE 115 12/09/2020    PROT 6.6 10/16/2019    LABALBU 3.6 10/16/2019    BILITOT 0.35 10/16/2019    ALKPHOS 75 10/16/2019    AST 15 12/09/2020    ALT 20 12/09/2020     Lab Results   Component Value Date    WBC 5.7 06/03/2020    RBC 4.73 06/03/2020    HGB 13.7 06/03/2020    HCT 44.6 06/03/2020    MCV 94.3 06/03/2020    MCH 29.0 06/03/2020    MCHC 30.7 06/03/2020    RDW 14.3 06/03/2020     06/03/2020    MPV 9.9 06/03/2020     Lab Results   Component Value Date    TSH 3.33 09/06/2018     Lab Results   Component Value Date    CHOL 166 06/03/2020    HDL 57 06/03/2020    LABA1C 5.3 12/10/2020       Assessment/Plan:      Diagnosis Orders   1. Acute bronchitis with COPD (HCC)  XR CHEST STANDARD (2 VW)    predniSONE (DELTASONE) 20 MG tablet    doxycycline hyclate (VIBRAMYCIN) 100 MG capsule    HYDROcodone-homatropine (HYCODAN) 5-1.5 MG/5ML syrup     Sent for stat chest x-ray. We will follow with results. Start medications ASAP. To the ER if any worsening. 1.  Renata received counseling on the following healthy behaviors: nutrition, exercise and medication adherence  2. Patient given educational materials - see patient instructions  3. Was a self-tracking handout given in paper form or via [employer]? No  If yes, see orders or list here. 4.  Discussed use, benefit, and side effects of prescribed medications. Barriers to medication compliance addressed. All patient questions answered. Pt voiced understanding. 5.  Reviewed prior labs and health maintenance  6. Continue current medications, diet and exercise.     Completed Refills   Requested Prescriptions     Signed Prescriptions Disp Refills    predniSONE (DELTASONE) 20 MG tablet 18 tablet 0     Sig: Take 3 tablets by mouth daily for 3 days, THEN 2 tablets daily for 3 days, THEN 1 tablet daily for 3 days.  doxycycline hyclate (VIBRAMYCIN) 100 MG capsule 20 capsule 0     Sig: Take 1 capsule by mouth 2 times daily for 10 days    HYDROcodone-homatropine (HYCODAN) 5-1.5 MG/5ML syrup 120 mL 0     Sig: Take 5 mLs by mouth 4 times daily as needed (cough) for up to 6 days. Return if symptoms worsen or fail to improve.

## 2022-01-12 ENCOUNTER — TELEPHONE (OUTPATIENT)
Dept: PRIMARY CARE CLINIC | Age: 62
End: 2022-01-12

## 2022-01-12 NOTE — TELEPHONE ENCOUNTER
----- Message from [address] [place] [address], APRN - CNP sent at 1/12/2022  8:18 AM EST -----  CXR is compatible with COVID, recommend ER if any worsening. Thank you.

## 2022-01-12 NOTE — TELEPHONE ENCOUNTER
Called patient and reminded her to have her blood work done that Ele had ordered. Verbalizes understanding.

## 2022-01-13 NOTE — TELEPHONE ENCOUNTER
Home test was positive. Should she take steroid? Today throat is really sore, cough is not as bad, but ribs still hurts. Still passes out from coughing.

## 2022-01-13 NOTE — TELEPHONE ENCOUNTER
Would not recommend taking steroid. If cough medications not working and she is feeling worse needs to be seen in ER. Thank you.

## 2022-01-27 ENCOUNTER — TELEPHONE (OUTPATIENT)
Dept: PRIMARY CARE CLINIC | Age: 62
End: 2022-01-27

## 2022-02-07 DIAGNOSIS — R13.10 DYSPHAGIA, UNSPECIFIED TYPE: ICD-10-CM

## 2022-02-07 RX ORDER — OMEPRAZOLE 20 MG/1
CAPSULE, DELAYED RELEASE ORAL
Qty: 90 CAPSULE | Refills: 3 | Status: SHIPPED | OUTPATIENT
Start: 2022-02-07

## 2022-02-07 NOTE — TELEPHONE ENCOUNTER
Health Maintenance   Topic Date Due    Lipid screen  06/03/2021    Shingles Vaccine (1 of 2) 06/18/2022 (Originally 7/12/2009)    Hepatitis C screen  06/18/2022 (Originally 1960)    HIV screen  06/18/2022 (Originally 7/12/1974)    DTaP/Tdap/Td vaccine (1 - Tdap) 01/04/2023 (Originally 7/12/1978)    COVID-19 Vaccine (3 - Booster for Maria Ineselyn Yung series) 01/04/2023 (Originally 9/6/2021)    Low dose CT lung screening  07/12/2022    Depression Monitoring  01/04/2023    Diabetes screen  12/10/2023    Breast cancer screen  12/20/2023    Pneumococcal 0-64 years Vaccine (2 of 2 - PPSV23) 07/12/2024    Colon cancer screen colonoscopy  09/25/2028    Flu vaccine  Completed    Hepatitis A vaccine  Aged Out    Hepatitis B vaccine  Aged Out    Hib vaccine  Aged Out    Meningococcal (ACWY) vaccine  Aged Out             (applicable per patient's age: Cancer Screenings, Depression Screening, Fall Risk Screening, Immunizations)    Hemoglobin A1C (%)   Date Value   12/10/2020 5.3   05/30/2019 5.3   09/06/2018 5.1     Microalb/Crt.  Ratio (mcg/mg creat)   Date Value   06/03/2020 CANNOT BE CALCULATED     LDL Cholesterol (mg/dL)   Date Value   06/03/2020 68     AST (U/L)   Date Value   12/09/2020 15     ALT (U/L)   Date Value   12/09/2020 20     BUN (mg/dL)   Date Value   12/09/2020 11      (goal A1C is < 7)   (goal LDL is <100) need 30-50% reduction from baseline     BP Readings from Last 3 Encounters:   01/11/22 132/70   01/04/22 122/70   11/15/21 138/78    (goal /80)      All Future Testing planned in [facility]:  Lab Frequency Next Occurrence   CBC Auto Differential Once 02/05/2022   ALT Once 02/05/2022   AST Once 87/53/0093   Basic Metabolic Panel Once 29/89/6099   Lipid Panel Once 02/05/2022       Next Visit Date:  Future Appointments   Date Time Provider Ryanne Mark   7/6/2022 11:40 AM Antwon Salgado, APRN - CNP [facility]            Patient Active Problem List:     Moderate episode of recurrent major depressive disorder ([facility] [address]     Gastroesophageal reflux disease     Neurological symptoms     Acute diverticulitis     Anxiety     Depression     Hypertension     Hyperlipidemia     Morbidly obese (HCC)     Former smoker     Chronic obstructive pulmonary disease ([facility] [address]     Dysphagia

## 2022-02-14 ENCOUNTER — TELEPHONE (OUTPATIENT)
Dept: PRIMARY CARE CLINIC | Age: 62
End: 2022-02-14

## 2022-03-01 ENCOUNTER — TELEPHONE (OUTPATIENT)
Dept: PRIMARY CARE CLINIC | Age: 62
End: 2022-03-01

## 2022-03-24 ENCOUNTER — HOSPITAL ENCOUNTER (OUTPATIENT)
Age: 62
Discharge: HOME | End: 2022-03-24
Payer: MEDICARE

## 2022-03-24 DIAGNOSIS — I10 ESSENTIAL HYPERTENSION: ICD-10-CM

## 2022-03-24 DIAGNOSIS — E78.2 MIXED HYPERLIPIDEMIA: ICD-10-CM

## 2022-03-24 LAB
ABSOLUTE EOS #: 0.24 K/UL (ref 0–0.44)
ABSOLUTE IMMATURE GRANULOCYTE: 0.08 K/UL (ref 0–0.3)
ABSOLUTE LYMPH #: 1 K/UL (ref 1.1–3.7)
ABSOLUTE MONO #: 0.67 K/UL (ref 0.1–1.2)
ALT SERPL-CCNC: 53 U/L (ref 5–33)
ANION GAP SERPL CALCULATED.3IONS-SCNC: 14 MMOL/L (ref 9–17)
AST SERPL-CCNC: 40 U/L
BASOPHILS # BLD: 1 % (ref 0–2)
BASOPHILS ABSOLUTE: 0.07 K/UL (ref 0–0.2)
BUN BLDV-MCNC: 16 MG/DL (ref 8–23)
BUN/CREAT BLD: 16 (ref 9–20)
CALCIUM SERPL-MCNC: 10.1 MG/DL (ref 8.6–10.4)
CHLORIDE BLD-SCNC: 101 MMOL/L (ref 98–107)
CHOLESTEROL/HDL RATIO: 4.1
CHOLESTEROL: 240 MG/DL
CO2: 24 MMOL/L (ref 20–31)
CREAT SERPL-MCNC: 0.97 MG/DL (ref 0.5–0.9)
EOSINOPHILS RELATIVE PERCENT: 3 % (ref 1–4)
GFR AFRICAN AMERICAN: >60 ML/MIN
GFR NON-AFRICAN AMERICAN: 58 ML/MIN
GFR SERPL CREATININE-BSD FRML MDRD: ABNORMAL ML/MIN/{1.73_M2}
GFR SERPL CREATININE-BSD FRML MDRD: ABNORMAL ML/MIN/{1.73_M2}
GLUCOSE BLD-MCNC: 142 MG/DL (ref 70–99)
HCT VFR BLD CALC: 45.1 % (ref 36.3–47.1)
HDLC SERPL-MCNC: 58 MG/DL
HEMOGLOBIN: 14.1 G/DL (ref 11.9–15.1)
IMMATURE GRANULOCYTES: 1 %
LDL CHOLESTEROL: 121 MG/DL (ref 0–130)
LYMPHOCYTES # BLD: 14 % (ref 24–43)
MCH RBC QN AUTO: 28.2 PG (ref 25.2–33.5)
MCHC RBC AUTO-ENTMCNC: 31.3 G/DL (ref 28.4–34.8)
MCV RBC AUTO: 90.2 FL (ref 82.6–102.9)
MONOCYTES # BLD: 10 % (ref 3–12)
NRBC AUTOMATED: 0 PER 100 WBC
PDW BLD-RTO: 13.7 % (ref 11.8–14.4)
PLATELET # BLD: 307 K/UL (ref 138–453)
PMV BLD AUTO: 9.6 FL (ref 8.1–13.5)
POTASSIUM SERPL-SCNC: 4.6 MMOL/L (ref 3.7–5.3)
RBC # BLD: 5 M/UL (ref 3.95–5.11)
SEG NEUTROPHILS: 71 % (ref 36–65)
SEGMENTED NEUTROPHILS ABSOLUTE COUNT: 4.92 K/UL (ref 1.5–8.1)
SODIUM BLD-SCNC: 139 MMOL/L (ref 135–144)
TRIGL SERPL-MCNC: 304 MG/DL
WBC # BLD: 7 K/UL (ref 3.5–11.3)

## 2022-03-24 PROCEDURE — 80061 LIPID PANEL: CPT

## 2022-03-24 PROCEDURE — 36415 COLL VENOUS BLD VENIPUNCTURE: CPT

## 2022-03-24 PROCEDURE — 84460 ALANINE AMINO (ALT) (SGPT): CPT

## 2022-03-24 PROCEDURE — 84450 TRANSFERASE (AST) (SGOT): CPT

## 2022-03-24 PROCEDURE — 80048 BASIC METABOLIC PNL TOTAL CA: CPT

## 2022-03-24 PROCEDURE — 85025 COMPLETE CBC W/AUTO DIFF WBC: CPT

## 2022-03-25 ENCOUNTER — TELEPHONE (OUTPATIENT)
Dept: PRIMARY CARE CLINIC | Age: 62
End: 2022-03-25

## 2022-03-25 NOTE — TELEPHONE ENCOUNTER
Called and let pt know of lab results. I tried to schedule appointment for A1C check she said she can not schedule now she will call back to do so.

## 2022-03-25 NOTE — TELEPHONE ENCOUNTER
----- Message from [address] [place] [address], OLIVER - CNP sent at 3/24/2022  6:35 PM EDT -----  Blood sugar is high, needs to come in fur NURSE visit for A1c check.

## 2022-03-29 ENCOUNTER — HOSPITAL ENCOUNTER (OUTPATIENT)
Dept: GENERAL RADIOLOGY | Age: 62
Discharge: HOME | End: 2022-03-31
Payer: MEDICARE

## 2022-03-29 ENCOUNTER — OFFICE VISIT (OUTPATIENT)
Dept: PRIMARY CARE CLINIC | Age: 62
End: 2022-03-29
Payer: MEDICARE

## 2022-03-29 ENCOUNTER — HOSPITAL ENCOUNTER (OUTPATIENT)
Age: 62
Discharge: HOME | End: 2022-03-31
Payer: MEDICARE

## 2022-03-29 ENCOUNTER — HOSPITAL ENCOUNTER (OUTPATIENT)
Dept: VASCULAR LAB | Age: 62
Discharge: HOME | End: 2022-03-31
Payer: MEDICARE

## 2022-03-29 ENCOUNTER — TELEPHONE (OUTPATIENT)
Dept: PRIMARY CARE CLINIC | Age: 62
End: 2022-03-29

## 2022-03-29 VITALS
OXYGEN SATURATION: 98 % | SYSTOLIC BLOOD PRESSURE: 130 MMHG | BODY MASS INDEX: 44.32 KG/M2 | RESPIRATION RATE: 22 BRPM | DIASTOLIC BLOOD PRESSURE: 78 MMHG | WEIGHT: 242.3 LBS | TEMPERATURE: 97.7 F | HEART RATE: 118 BPM

## 2022-03-29 DIAGNOSIS — S46.911A STRAIN OF RIGHT SHOULDER, INITIAL ENCOUNTER: ICD-10-CM

## 2022-03-29 DIAGNOSIS — M79.601 PAIN AND SWELLING OF UPPER EXTREMITY, RIGHT: ICD-10-CM

## 2022-03-29 DIAGNOSIS — R73.01 ELEVATED FASTING BLOOD SUGAR: ICD-10-CM

## 2022-03-29 DIAGNOSIS — S46.911A ELBOW STRAIN, RIGHT, INITIAL ENCOUNTER: ICD-10-CM

## 2022-03-29 DIAGNOSIS — Z12.11 SCREEN FOR COLON CANCER: ICD-10-CM

## 2022-03-29 DIAGNOSIS — M79.601 PAIN AND SWELLING OF UPPER EXTREMITY, RIGHT: Primary | ICD-10-CM

## 2022-03-29 DIAGNOSIS — M79.89 PAIN AND SWELLING OF UPPER EXTREMITY, RIGHT: Primary | ICD-10-CM

## 2022-03-29 DIAGNOSIS — M79.89 PAIN AND SWELLING OF UPPER EXTREMITY, RIGHT: ICD-10-CM

## 2022-03-29 LAB — HBA1C MFR BLD: 5.6 %

## 2022-03-29 PROCEDURE — G8417 CALC BMI ABV UP PARAM F/U: HCPCS | Performed by: NURSE PRACTITIONER

## 2022-03-29 PROCEDURE — 3017F COLORECTAL CA SCREEN DOC REV: CPT | Performed by: NURSE PRACTITIONER

## 2022-03-29 PROCEDURE — 73030 X-RAY EXAM OF SHOULDER: CPT

## 2022-03-29 PROCEDURE — 73080 X-RAY EXAM OF ELBOW: CPT

## 2022-03-29 PROCEDURE — 99214 OFFICE O/P EST MOD 30 MIN: CPT | Performed by: NURSE PRACTITIONER

## 2022-03-29 PROCEDURE — 1036F TOBACCO NON-USER: CPT | Performed by: NURSE PRACTITIONER

## 2022-03-29 PROCEDURE — G8427 DOCREV CUR MEDS BY ELIG CLIN: HCPCS | Performed by: NURSE PRACTITIONER

## 2022-03-29 PROCEDURE — G8482 FLU IMMUNIZE ORDER/ADMIN: HCPCS | Performed by: NURSE PRACTITIONER

## 2022-03-29 PROCEDURE — 83036 HEMOGLOBIN GLYCOSYLATED A1C: CPT | Performed by: NURSE PRACTITIONER

## 2022-03-29 PROCEDURE — 93971 EXTREMITY STUDY: CPT

## 2022-03-29 RX ORDER — TRAMADOL HYDROCHLORIDE 50 MG/1
50 TABLET ORAL EVERY 4 HOURS PRN
Qty: 18 TABLET | Refills: 0 | Status: SHIPPED | OUTPATIENT
Start: 2022-03-29 | End: 2022-04-01

## 2022-03-29 SDOH — ECONOMIC STABILITY: FOOD INSECURITY: WITHIN THE PAST 12 MONTHS, THE FOOD YOU BOUGHT JUST DIDN'T LAST AND YOU DIDN'T HAVE MONEY TO GET MORE.: PATIENT DECLINED

## 2022-03-29 SDOH — ECONOMIC STABILITY: FOOD INSECURITY: WITHIN THE PAST 12 MONTHS, YOU WORRIED THAT YOUR FOOD WOULD RUN OUT BEFORE YOU GOT MONEY TO BUY MORE.: PATIENT DECLINED

## 2022-03-29 ASSESSMENT — PATIENT HEALTH QUESTIONNAIRE - PHQ9
2. FEELING DOWN, DEPRESSED OR HOPELESS: 0
SUM OF ALL RESPONSES TO PHQ QUESTIONS 1-9: 0
SUM OF ALL RESPONSES TO PHQ9 QUESTIONS 1 & 2: 0
SUM OF ALL RESPONSES TO PHQ QUESTIONS 1-9: 0
1. LITTLE INTEREST OR PLEASURE IN DOING THINGS: 0
SUM OF ALL RESPONSES TO PHQ QUESTIONS 1-9: 0
SUM OF ALL RESPONSES TO PHQ QUESTIONS 1-9: 0

## 2022-03-29 ASSESSMENT — ENCOUNTER SYMPTOMS
COUGH: 0
SORE THROAT: 0
DIARRHEA: 0
SHORTNESS OF BREATH: 0
RHINORRHEA: 0
VOMITING: 0
ABDOMINAL PAIN: 0
CONSTIPATION: 0
NAUSEA: 0
WHEEZING: 0

## 2022-03-29 ASSESSMENT — SOCIAL DETERMINANTS OF HEALTH (SDOH): HOW HARD IS IT FOR YOU TO PAY FOR THE VERY BASICS LIKE FOOD, HOUSING, MEDICAL CARE, AND HEATING?: PATIENT DECLINED

## 2022-03-29 NOTE — TELEPHONE ENCOUNTER
Called patient and left message that the xray of her shoulder was normal. To call office with any questions.

## 2022-03-29 NOTE — TELEPHONE ENCOUNTER
----- Message from OLIVER Mitchell CNP sent at 3/29/2022  3:04 PM EDT -----  Results are normal, please call patient and make them aware.

## 2022-03-29 NOTE — PATIENT INSTRUCTIONS
SURVEY:     You may be receiving a survey from [employer] regarding your visit today. Please complete the survey to enable us to provide the highest quality of care to you and your family. If you cannot score us a very good on any question, please call the office to discuss how we could have made your experience a very good one. Thank you.   Halle Salgado, APRN-JONATHAN Aragon, JONATHAN Davis, LPSEJAL Martinez, RADHA Amin, MARIBEL Booth, MARIBEL Knight, MARIBEL Black, PCA

## 2022-03-29 NOTE — PROGRESS NOTES
Name: Shakira Reaves  : 1960         Chief Complaint:     Chief Complaint   Patient presents with    Bleeding/Bruising     X 3 days, left elbow. \"closed gas cap on car\". Painful. History of Present Illness:      Shakira eRaves is a 57 y.o.  female who presents with Bleeding/Bruising (X 3 days, left elbow. \"closed gas cap on car\". Painful. )      Yolande Son is here today for a routine office visit. Elevated fasting glucose-142 on recent labs, A1c is 5.6 in the office. Patient states she may not have been fasting for her lab work. Shoulder Pain   The pain is present in the right shoulder, right elbow and right arm. This is a new problem. The current episode started in the past 7 days. There has been a history of trauma. The problem occurs daily. The problem has been waxing and waning. The quality of the pain is described as aching. The pain is at a severity of 5/10. The pain is moderate. Associated symptoms include a limited range of motion and stiffness. Pertinent negatives include no fever, inability to bear weight, itching, joint locking, joint swelling, numbness or tingling. The symptoms are aggravated by activity and contact. She has tried rest and acetaminophen for the symptoms. The treatment provided mild relief. Family history does not include rheumatoid arthritis. Her past medical history is significant for osteoarthritis. There is no history of diabetes, gout or rheumatoid arthritis. Arm Pain   The incident occurred 5 to 7 days ago. The incident occurred at home. The injury mechanism was twisted. The pain is present in the upper right arm, right shoulder and right elbow. The quality of the pain is described as aching. The pain does not radiate. The pain is at a severity of 5/10. The pain is moderate. The pain has been fluctuating since the incident. Pertinent negatives include no chest pain, muscle weakness, numbness or tingling.  The symptoms are aggravated by movement, lifting and palpation. She has tried rest and acetaminophen for the symptoms. The treatment provided mild relief. Past Medical History:     Past Medical History:   Diagnosis Date    Anxiety 2012    Broken bones 1985    Depression 2012    Gallbladder & bile duct stone with obstruction 2000    Hyperlipidemia 2012    Hypertension 2012    Numbness and tingling     generalized    Stroke (cerebrum) ([facility] [address] 2018      Reviewed all health maintenance requirements and ordered appropriate tests  There are no preventive care reminders to display for this patient. Past Surgical History:     Past Surgical History:   Procedure Laterality Date    ABDOMEN SURGERY      repair of bile duct.  CHOLECYSTECTOMY      COLONOSCOPY      ENDOSCOPY, COLON, DIAGNOSTIC      HERNIA REPAIR  2015    HYSTERECTOMY      uterine prolapse, ovaries remain.  TX COLON CA SCRN NOT HI RSK IND N/A 9/25/2018    -normal    TONSILLECTOMY AND ADENOIDECTOMY      UPPER GASTROINTESTINAL ENDOSCOPY  07/09/2020    UPPER GASTROINTESTINAL ENDOSCOPY N/A 7/9/2020    [facility]        Medications:       Prior to Admission medications    Medication Sig Start Date End Date Taking? Authorizing Provider   traMADol (ULTRAM) 50 MG tablet Take 1 tablet by mouth every 4 hours as needed for Pain for up to 3 days. Intended supply: 3 days.  Take lowest dose possible to manage pain 3/29/22 4/1/22 Yes Ella Arias Might, APRN - CNP   omeprazole (PRILOSEC) 20 MG delayed release capsule TAKE ONE CAPSULE BY MOUTH DAILY 2/7/22  Yes Ella Arias Might, APRN - JONATHAN   buPROPion (WELLBUTRIN XL) 300 MG extended release tablet TAKE ONE TABLET BY MOUTH EVERY MORNING 1/4/22  Yes Ella Salgado, APRN - CNP   sertraline (ZOLOFT) 100 MG tablet TAKE ONE TABLET BY MOUTH DAILY 1/4/22  Yes Ella Salgado, APRN - CNP   dilTIAZem (TIAZAC) 180 MG extended release capsule TAKE ONE CAPSULE BY MOUTH DAILY 12/27/21  Yes Ella Salgado, APRN - JONATHAN albuterol sulfate  (90 Base) MCG/ACT inhaler Inhale 2 puffs into the lungs every 6 hours as needed for Wheezing or Shortness of Breath 7/1/21  Yes Alexandria Vega MD   budesonide-formoterol (SYMBICORT) 160-4.5 MCG/ACT AERO Inhale 2 puffs into the lungs 2 times daily 7/1/21  Yes Alexandria Vega MD   tiotropium (SPIRIVA RESPIMAT) 2.5 MCG/ACT AERS inhaler Inhale 2 puffs into the lungs daily 7/1/21  Yes Alexandria Vega MD   atenolol (TENORMIN) 25 MG tablet TAKE ONE TABLET BY MOUTH DAILY 6/1/21  Yes OLIVER Mendez CNP   Respiratory Therapy Supplies (NEBULIZER/TUBING/MOUTHPIECE) KIT 1 kit by Does not apply route daily as needed (wheezing, sob) 2/13/20  Yes OLIVER Mendez CNP   albuterol (PROVENTIL) (2.5 MG/3ML) 0.083% nebulizer solution USE THREE MILLILITERS VIA NEBULIZATION BY MOUTH EVERY 4 HOURS AS NEEDED FOR WHEEZING OR FOR SHORTNESS OF BREATH  Patient not taking: Reported on 3/29/2022 11/30/21   OLIVER Mendez CNP   fluticasone (FLONASE) 50 MCG/ACT nasal spray 1 spray by Each Nostril route daily  Patient not taking: Reported on 1/4/2022 3/26/21   OLIVER Ralph CNP   aspirin 81 MG chewable tablet Take 81 mg by mouth daily  Patient not taking: Reported on 1/4/2022    Historical Provider, MD        Allergies:       Patient has no known allergies. Social History:     Tobacco:    reports that she quit smoking about 2 years ago. She has a 30.00 pack-year smoking history. She has never used smokeless tobacco.  Alcohol:      reports no history of alcohol use. Drug Use:  reports no history of drug use.     Family History:     Family History   Adopted: Yes   Problem Relation Age of Onset    Asthma Mother     Cancer Mother         Skin    Depression Mother     Hearing Loss Mother     High Cholesterol Mother     High Blood Pressure Mother     Osteoporosis Mother     Stroke Mother        Review of Systems:     Positive and Negative as described in HPI    Review of Systems Constitutional: Negative for chills, fatigue and fever. HENT: Negative for congestion, rhinorrhea and sore throat. Eyes: Negative for visual disturbance. Respiratory: Negative for cough, shortness of breath and wheezing. Cardiovascular: Negative for chest pain and palpitations. Gastrointestinal: Negative for abdominal pain, constipation, diarrhea, nausea and vomiting. Genitourinary: Negative for difficulty urinating and dysuria. Musculoskeletal: Positive for arthralgias, myalgias and stiffness. Negative for gait problem, gout, neck pain and neck stiffness. Skin: Negative for itching and rash. Neurological: Negative for dizziness, tingling, syncope, light-headedness, numbness and headaches. Physical Exam:   Vitals:  /78 (Position: Sitting)   Pulse 118   Temp 97.7 °F (36.5 °C) (Temporal)   Resp 22   Wt 242 lb 4.8 oz (109.9 kg)   SpO2 98%   BMI 44.32 kg/m²     Physical Exam  Vitals and nursing note reviewed. Constitutional:       General: She is not in acute distress. Appearance: Normal appearance. She is obese. She is not ill-appearing. HENT:      Mouth/Throat:      Mouth: Mucous membranes are moist.   Eyes:      General: No scleral icterus. Conjunctiva/sclera: Conjunctivae normal.   Cardiovascular:      Rate and Rhythm: Normal rate and regular rhythm. Pulses: Normal pulses. Radial pulses are 2+ on the right side and 2+ on the left side. Pulmonary:      Effort: Pulmonary effort is normal.      Breath sounds: Normal breath sounds. No wheezing. Musculoskeletal:         General: Signs of injury present. No deformity. Right shoulder: Tenderness present. Decreased range of motion. Right upper arm: Swelling present. Left upper arm: Tenderness present. Right elbow: Normal range of motion. Tenderness present. Arms:       Cervical back: Normal range of motion and neck supple. Skin:     General: Skin is warm and dry. Findings: Bruising (right UE) present. Neurological:      Mental Status: She is alert and oriented to person, place, and time. Psychiatric:         Mood and Affect: Mood normal.         Behavior: Behavior normal.         Data:     Lab Results   Component Value Date     03/24/2022    K 4.6 03/24/2022     03/24/2022    CO2 24 03/24/2022    BUN 16 03/24/2022    CREATININE 0.97 03/24/2022    GLUCOSE 142 03/24/2022    PROT 6.6 10/16/2019    LABALBU 3.6 10/16/2019    BILITOT 0.35 10/16/2019    ALKPHOS 75 10/16/2019    AST 40 03/24/2022    ALT 53 03/24/2022     Lab Results   Component Value Date    WBC 7.0 03/24/2022    RBC 5.00 03/24/2022    HGB 14.1 03/24/2022    HCT 45.1 03/24/2022    MCV 90.2 03/24/2022    MCH 28.2 03/24/2022    MCHC 31.3 03/24/2022    RDW 13.7 03/24/2022     03/24/2022    MPV 9.6 03/24/2022     Lab Results   Component Value Date    TSH 3.33 09/06/2018     Lab Results   Component Value Date    CHOL 240 03/24/2022    HDL 58 03/24/2022    LABA1C 5.6 03/29/2022       Assessment/Plan:      Diagnosis Orders   1. Pain and swelling of upper extremity, right  VL DUP UPPER EXTREMITY VENOUS RIGHT    traMADol (ULTRAM) 50 MG tablet   2. Elbow strain, right, initial encounter  XR ELBOW RIGHT (MIN 3 VIEWS)    traMADol (ULTRAM) 50 MG tablet   3. Strain of right shoulder, initial encounter  XR SHOULDER RIGHT (MIN 2 VIEWS)    traMADol (ULTRAM) 50 MG tablet   4. Elevated fasting blood sugar  POCT glycosylated hemoglobin (Hb A1C)   5. Screen for colon cancer  POCT Fecal Immunochemical Test (FIT)     We will send for stat Doppler and radiographs. We will follow with results. Tramadol as needed for severe pain. Ice to the area several times a day. Follow-up if not improving. 1.  Renata received counseling on the following healthy behaviors: nutrition, exercise and medication adherence  2. Patient given educational materials - see patient instructions  3.   Was a self-tracking handout given in paper form or via [employer]? No  If yes, see orders or list here. 4.  Discussed use, benefit, and side effects of prescribed medications. Barriers to medication compliance addressed. All patient questions answered. Pt voiced understanding. 5.  Reviewed prior labs and health maintenance  6. Continue current medications, diet and exercise. Completed Refills   Requested Prescriptions     Signed Prescriptions Disp Refills    traMADol (ULTRAM) 50 MG tablet 18 tablet 0     Sig: Take 1 tablet by mouth every 4 hours as needed for Pain for up to 3 days. Intended supply: 3 days. Take lowest dose possible to manage pain         Return if symptoms worsen or fail to improve.

## 2022-06-13 ENCOUNTER — TELEPHONE (OUTPATIENT)
Dept: ONCOLOGY | Age: 62
End: 2022-06-13

## 2022-06-13 DIAGNOSIS — Z87.891 PERSONAL HISTORY OF NICOTINE DEPENDENCE: Primary | ICD-10-CM

## 2022-06-13 NOTE — TELEPHONE ENCOUNTER
Our records indicate that your patient is coming due for their annual lung cancer screening follow up testing. For your convenience, we have pended the order for the scan for you. If you do not agree with the need for the test, please cancel the order and let us know. Sincerely,    [address] [place] [address] Screening Program    Auto printed reminder letter sent to patient.

## 2022-06-15 DIAGNOSIS — I10 ESSENTIAL HYPERTENSION: ICD-10-CM

## 2022-06-15 RX ORDER — ATENOLOL 25 MG/1
TABLET ORAL
Qty: 90 TABLET | Refills: 3 | Status: SHIPPED | OUTPATIENT
Start: 2022-06-15

## 2022-06-15 NOTE — TELEPHONE ENCOUNTER
Health Maintenance   Topic Date Due    Pneumococcal 0-64 years Vaccine (2 - PCV) 06/04/2020    Shingles vaccine (1 of 2) 06/18/2022 (Originally 7/12/2009)    Hepatitis C screen  06/18/2022 (Originally 7/12/1977)    HIV screen  06/18/2022 (Originally 7/12/1974)    DTaP/Tdap/Td vaccine (1 - Tdap) 01/04/2023 (Originally 7/12/1978)    COVID-19 Vaccine (3 - Booster for Moderna series) 01/04/2023 (Originally 9/6/2021)    Low dose CT lung screening  07/12/2022    Depression Monitoring  03/29/2023    Breast cancer screen  12/20/2023    Diabetes screen  03/29/2025    Lipids  03/24/2027    Colorectal Cancer Screen  09/25/2028    Flu vaccine  Completed    Hepatitis A vaccine  Aged Out    Hepatitis B vaccine  Aged Out    Hib vaccine  Aged Out    Meningococcal (ACWY) vaccine  Aged Out             (applicable per patient's age: Cancer Screenings, Depression Screening, Fall Risk Screening, Immunizations)    Hemoglobin A1C (%)   Date Value   03/29/2022 5.6   12/10/2020 5.3   05/30/2019 5.3     Microalb/Crt.  Ratio (mcg/mg creat)   Date Value   06/03/2020 CANNOT BE CALCULATED     LDL Cholesterol (mg/dL)   Date Value   03/24/2022 121     AST (U/L)   Date Value   03/24/2022 40 (H)     ALT (U/L)   Date Value   03/24/2022 53 (H)     BUN (mg/dL)   Date Value   03/24/2022 16      (goal A1C is < 7)   (goal LDL is <100) need 30-50% reduction from baseline     BP Readings from Last 3 Encounters:   03/29/22 130/78   01/11/22 132/70   01/04/22 122/70    (goal /80)      All Future Testing planned in [facility]:  Lab Frequency Next Occurrence   POCT Fecal Immunochemical Test (FIT) Once 04/19/2022   CT lung screen [Initial/Annual] Once 06/13/2022       Next Visit Date:  Future Appointments   Date Time Provider Ryanne Mark   6/16/2022 11:30 [facility]   7/6/2022 11:40 AM OLIVER Landin - CNP [facility]            Patient Active Problem List:     [facility]     Gastroesophageal reflux disease     Neurological symptoms     Acute diverticulitis     Anxiety     Depression     Hypertension     Hyperlipidemia     Morbidly obese (HCC)     Former smoker     Chronic obstructive pulmonary disease ([facility] [address]     Dysphagia

## 2022-08-04 DIAGNOSIS — I10 ESSENTIAL HYPERTENSION: ICD-10-CM

## 2022-08-04 RX ORDER — DILTIAZEM HYDROCHLORIDE 180 MG/1
CAPSULE, EXTENDED RELEASE ORAL
Qty: 90 CAPSULE | Refills: 1 | Status: SHIPPED | OUTPATIENT
Start: 2022-08-04

## 2022-08-04 NOTE — TELEPHONE ENCOUNTER
Health Maintenance   Topic Date Due    HIV screen  Never done    Hepatitis C screen  Never done    Shingles vaccine (1 of 2) Never done    Pneumococcal 0-64 years Vaccine (2 - PCV) 06/04/2020    Low dose CT lung screening  07/12/2022    DTaP/Tdap/Td vaccine (1 - Tdap) 01/04/2023 (Originally 7/12/1978)    COVID-19 Vaccine (3 - Booster for [place]ie [place] series) 01/04/2023 (Originally 9/6/2021)    Flu vaccine (1) 09/01/2022    Depression Monitoring  03/29/2023    Breast cancer screen  12/20/2023    Diabetes screen  03/29/2025    Lipids  03/24/2027    Colorectal Cancer Screen  09/25/2028    Hepatitis A vaccine  Aged Out    Hepatitis B vaccine  Aged Out    Hib vaccine  Aged Out    Meningococcal (ACWY) vaccine  Aged Out             (applicable per patient's age: Cancer Screenings, Depression Screening, Fall Risk Screening, Immunizations)    Hemoglobin A1C (%)   Date Value   03/29/2022 5.6   12/10/2020 5.3   05/30/2019 5.3     Microalb/Crt. Ratio (mcg/mg creat)   Date Value   06/03/2020 CANNOT BE CALCULATED     LDL Cholesterol (mg/dL)   Date Value   03/24/2022 121     AST (U/L)   Date Value   03/24/2022 40 (H)     ALT (U/L)   Date Value   03/24/2022 53 (H)     BUN (mg/dL)   Date Value   03/24/2022 16      (goal A1C is < 7)   (goal LDL is <100) need 30-50% reduction from baseline     BP Readings from Last 3 Encounters:   03/29/22 130/78   01/11/22 132/70   01/04/22 122/70    (goal /80)      All Future Testing planned in [facility]:  Lab Frequency Next Occurrence   POCT Fecal Immunochemical Test (FIT) Once 04/19/2022   CT lung screen [Initial/Annual] Once 06/13/2022       Next Visit Date:  No future appointments.          Patient Active Problem List:     Moderate episode of recurrent major depressive disorder ([facility] [address]     Gastroesophageal reflux disease     Neurological symptoms     Acute diverticulitis     Anxiety     Depression     Hypertension     Hyperlipidemia     Morbidly obese (HCC)     Former smoker     [facility]     Dysphagia

## 2022-09-15 DIAGNOSIS — F34.1 DYSTHYMIA: ICD-10-CM

## 2022-09-15 RX ORDER — SERTRALINE HYDROCHLORIDE 100 MG/1
TABLET, FILM COATED ORAL
Qty: 90 TABLET | Refills: 1 | Status: SHIPPED | OUTPATIENT
Start: 2022-09-15

## 2022-09-15 RX ORDER — BUPROPION HYDROCHLORIDE 300 MG/1
TABLET ORAL
Qty: 90 TABLET | Refills: 1 | Status: SHIPPED | OUTPATIENT
Start: 2022-09-15

## 2022-09-15 NOTE — TELEPHONE ENCOUNTER
Health Maintenance   Topic Date Due    Shingles vaccine (1 of 2) Never done    Pneumococcal 0-64 years Vaccine (2 - PCV) 06/04/2020    Low dose CT lung screening  07/12/2022    Flu vaccine (1) 09/01/2022    DTaP/Tdap/Td vaccine (1 - Tdap) 01/04/2023 (Originally 7/12/1978)    COVID-19 Vaccine (3 - Booster for [place] series) 01/04/2023 (Originally 9/6/2021)    Hepatitis C screen  08/29/2023 (Originally 7/12/1977)    HIV screen  08/29/2023 (Originally 7/12/1974)    Depression Monitoring  03/29/2023    Breast cancer screen  12/20/2023    Diabetes screen  03/29/2025    Lipids  03/24/2027    Colorectal Cancer Screen  09/25/2028    Hepatitis A vaccine  Aged Out    Hepatitis B vaccine  Aged Out    Hib vaccine  Aged Out    Meningococcal (ACWY) vaccine  Aged Out             (applicable per patient's age: Cancer Screenings, Depression Screening, Fall Risk Screening, Immunizations)    Hemoglobin A1C (%)   Date Value   03/29/2022 5.6   12/10/2020 5.3   05/30/2019 5.3     Microalb/Crt. Ratio (mcg/mg creat)   Date Value   06/03/2020 CANNOT BE CALCULATED     LDL Cholesterol (mg/dL)   Date Value   03/24/2022 121     AST (U/L)   Date Value   03/24/2022 40 (H)     ALT (U/L)   Date Value   03/24/2022 53 (H)     BUN (mg/dL)   Date Value   03/24/2022 16      (goal A1C is < 7)   (goal LDL is <100) need 30-50% reduction from baseline     BP Readings from Last 3 Encounters:   03/29/22 130/78   01/11/22 132/70   01/04/22 122/70    (goal /80)      All Future Testing planned in [facility]:  Lab Frequency Next Occurrence   POCT Fecal Immunochemical Test (FIT) Once 04/19/2022   CT lung screen [Initial/Annual] Once 06/13/2022       Next Visit Date:  No future appointments.          Patient Active Problem List:     Moderate episode of recurrent major depressive disorder ([facility] [address]     Gastroesophageal reflux disease     Neurological symptoms     Acute diverticulitis     Anxiety     Depression     Hypertension     Hyperlipidemia     Morbidly obese [facility])     Former smoker     Chronic obstructive pulmonary disease ([facility] [address]     Dysphagia

## 2022-10-06 ENCOUNTER — OFFICE VISIT (OUTPATIENT)
Dept: PRIMARY CARE CLINIC | Age: 62
End: 2022-10-06
Payer: MEDICARE

## 2022-10-06 VITALS
RESPIRATION RATE: 18 BRPM | SYSTOLIC BLOOD PRESSURE: 128 MMHG | BODY MASS INDEX: 42.43 KG/M2 | OXYGEN SATURATION: 97 % | TEMPERATURE: 97 F | HEART RATE: 114 BPM | WEIGHT: 232 LBS | DIASTOLIC BLOOD PRESSURE: 84 MMHG

## 2022-10-06 DIAGNOSIS — J02.9 PHARYNGITIS, UNSPECIFIED ETIOLOGY: ICD-10-CM

## 2022-10-06 DIAGNOSIS — Z23 NEED FOR INFLUENZA VACCINATION: ICD-10-CM

## 2022-10-06 DIAGNOSIS — J20.9 BRONCHITIS WITH BRONCHOSPASM: ICD-10-CM

## 2022-10-06 DIAGNOSIS — J44.1 COPD WITH ACUTE EXACERBATION (HCC): Primary | ICD-10-CM

## 2022-10-06 LAB — S PYO AG THROAT QL: NORMAL

## 2022-10-06 PROCEDURE — 99214 OFFICE O/P EST MOD 30 MIN: CPT | Performed by: NURSE PRACTITIONER

## 2022-10-06 PROCEDURE — 1036F TOBACCO NON-USER: CPT | Performed by: NURSE PRACTITIONER

## 2022-10-06 PROCEDURE — G8484 FLU IMMUNIZE NO ADMIN: HCPCS | Performed by: NURSE PRACTITIONER

## 2022-10-06 PROCEDURE — G8417 CALC BMI ABV UP PARAM F/U: HCPCS | Performed by: NURSE PRACTITIONER

## 2022-10-06 PROCEDURE — 3017F COLORECTAL CA SCREEN DOC REV: CPT | Performed by: NURSE PRACTITIONER

## 2022-10-06 PROCEDURE — 87880 STREP A ASSAY W/OPTIC: CPT | Performed by: NURSE PRACTITIONER

## 2022-10-06 PROCEDURE — G8427 DOCREV CUR MEDS BY ELIG CLIN: HCPCS | Performed by: NURSE PRACTITIONER

## 2022-10-06 PROCEDURE — 3023F SPIROM DOC REV: CPT | Performed by: NURSE PRACTITIONER

## 2022-10-06 RX ORDER — BENZONATATE 100 MG/1
100-200 CAPSULE ORAL 3 TIMES DAILY PRN
Qty: 60 CAPSULE | Refills: 0 | Status: SHIPPED | OUTPATIENT
Start: 2022-10-06 | End: 2022-10-13

## 2022-10-06 RX ORDER — PREDNISONE 20 MG/1
20 TABLET ORAL 2 TIMES DAILY
Qty: 10 TABLET | Refills: 0 | Status: SHIPPED | OUTPATIENT
Start: 2022-10-06 | End: 2022-10-11

## 2022-10-06 RX ORDER — AZITHROMYCIN 250 MG/1
250 TABLET, FILM COATED ORAL SEE ADMIN INSTRUCTIONS
Qty: 6 TABLET | Refills: 0 | Status: SHIPPED | OUTPATIENT
Start: 2022-10-06 | End: 2022-10-11

## 2022-10-06 ASSESSMENT — PATIENT HEALTH QUESTIONNAIRE - PHQ9
SUM OF ALL RESPONSES TO PHQ QUESTIONS 1-9: 0
2. FEELING DOWN, DEPRESSED OR HOPELESS: 0
1. LITTLE INTEREST OR PLEASURE IN DOING THINGS: 0
SUM OF ALL RESPONSES TO PHQ QUESTIONS 1-9: 0
SUM OF ALL RESPONSES TO PHQ9 QUESTIONS 1 & 2: 0
SUM OF ALL RESPONSES TO PHQ QUESTIONS 1-9: 0
SUM OF ALL RESPONSES TO PHQ QUESTIONS 1-9: 0
7. TROUBLE CONCENTRATING ON THINGS, SUCH AS READING THE NEWSPAPER OR WATCHING TELEVISION: 0
4. FEELING TIRED OR HAVING LITTLE ENERGY: 0
10. IF YOU CHECKED OFF ANY PROBLEMS, HOW DIFFICULT HAVE THESE PROBLEMS MADE IT FOR YOU TO DO YOUR WORK, TAKE CARE OF THINGS AT HOME, OR GET ALONG WITH OTHER PEOPLE: 0
5. POOR APPETITE OR OVEREATING: 0
6. FEELING BAD ABOUT YOURSELF - OR THAT YOU ARE A FAILURE OR HAVE LET YOURSELF OR YOUR FAMILY DOWN: 0
8. MOVING OR SPEAKING SO SLOWLY THAT OTHER PEOPLE COULD HAVE NOTICED. OR THE OPPOSITE, BEING SO FIGETY OR RESTLESS THAT YOU HAVE BEEN MOVING AROUND A LOT MORE THAN USUAL: 0
9. THOUGHTS THAT YOU WOULD BE BETTER OFF DEAD, OR OF HURTING YOURSELF: 0
3. TROUBLE FALLING OR STAYING ASLEEP: 0

## 2022-10-06 ASSESSMENT — ENCOUNTER SYMPTOMS
WHEEZING: 1
COUGH: 1
SINUS PAIN: 1
RHINORRHEA: 1
GASTROINTESTINAL NEGATIVE: 1
SHORTNESS OF BREATH: 1
SORE THROAT: 1
SINUS PRESSURE: 1
EYES NEGATIVE: 1

## 2022-10-06 NOTE — PROGRESS NOTES
[facility]  Christina Mcnamara, [address] [facility] PRIMARY CARE  [address]  Dept: [phone]  Dept Fax: [phone]      Name: Bridget Gannon  : 1960         Chief Complaint:     Chief Complaint   Patient presents with    Pharyngitis     X 6 days. Cough     X 6 days    Congestion     X 6 days. History of Present Illness:      Bridget Gannon is a 60 y.o.  female who presents with Pharyngitis (X 6 days. ), Cough (X 6 days), and Congestion (X 6 days. )    Kaitlin López is here today for a sick visit. She has a sore throat, worsening cough, and congestion for 6 days. She states her cough is productive and has coughing fits where she chokes. She has not taken her temperature at home. She has a headache at times. She states she did have some right ear pain. She has been taking Mucinex, Dayquil, and NyQuil at home. She has been using her  inhalers and nebulizer at home for wheezing and shortness of breath. Past Medical History:     Past Medical History:   Diagnosis Date    Anxiety     Broken bones 1985    Depression     Gallbladder & bile duct stone with obstruction     Hyperlipidemia     Hypertension     Numbness and tingling     generalized    Stroke (cerebrum) ([facility] [address] 2018      Reviewed all health maintenance requirements and ordered appropriate tests  Health Maintenance Due   Topic Date Due    Low dose CT lung screening  2022    Flu vaccine (1) 2022       Past Surgical History:     Past Surgical History:   Procedure Laterality Date    ABDOMEN SURGERY      repair of bile duct. CHOLECYSTECTOMY      COLONOSCOPY      ENDOSCOPY, COLON, DIAGNOSTIC      HERNIA REPAIR      HYSTERECTOMY (CERVIX STATUS UNKNOWN)      uterine prolapse, ovaries remain.     ID COLON CA SCRN NOT HI RSK IND N/A 2018    -normal    TONSILLECTOMY AND ADENOIDECTOMY      UPPER GASTROINTESTINAL ENDOSCOPY  2020    UPPER GASTROINTESTINAL ENDOSCOPY N/A 7/9/2020    [facility]GD [facility]SY [facility]        Medications:       Prior to Admission medications    Medication Sig Start Date End Date Taking?  Authorizing Provider   azithromycin (ZITHROMAX) 250 MG tablet Take 1 tablet by mouth See Admin Instructions for 5 days 500mg on day 1 followed by 250mg on days 2 - 5 10/6/22 10/11/22 Yes OLIVER Grider CNP   predniSONE (DELTASONE) 20 MG tablet Take 1 tablet by mouth 2 times daily for 5 days 10/6/22 10/11/22 Yes OLIVER Grider CNP   benzonatate (TESSALON) 100 MG capsule Take 1-2 capsules by mouth 3 times daily as needed for Cough 10/6/22 10/13/22 Yes OLIVER Grider CNP   sertraline (ZOLOFT) 100 MG tablet TAKE ONE TABLET BY MOUTH DAILY 9/15/22  Yes OLIVER Schmitz CNP   buPROPion (WELLBUTRIN XL) 300 MG extended release tablet TAKE ONE TABLET BY MOUTH EVERY MORNING 9/15/22  Yes OsOLIVER Freire CNP   dilTIAZem (TIAZAC) 180 MG extended release capsule TAKE ONE CAPSULE BY MOUTH DAILY 8/4/22  Yes OLIVER Grider CNP   atenolol (TENORMIN) 25 MG tablet TAKE ONE TABLET BY MOUTH DAILY 6/15/22  Yes OLIVER Schmitz CNP   omeprazole (PRILOSEC) 20 MG delayed release capsule TAKE ONE CAPSULE BY MOUTH DAILY 2/7/22  Yes OsOLIVER Freire CNP   albuterol sulfate  (90 Base) MCG/ACT inhaler Inhale 2 puffs into the lungs every 6 hours as needed for Wheezing or Shortness of Breath 7/1/21  Yes Clarisa Pratt MD   budesonide-formoterol (SYMBICORT) 160-4.5 MCG/ACT AERO Inhale 2 puffs into the lungs 2 times daily 7/1/21  Yes Clarisa Pratt MD   tiotropium (SPIRIVA RESPIMAT) 2.5 MCG/ACT AERS inhaler Inhale 2 puffs into the lungs daily 7/1/21  Yes Clarisa Pratt MD   Respiratory Therapy Supplies (NEBULIZER/TUBING/MOUTHPIECE) KIT 1 kit by Does not apply route daily as needed (wheezing, sob) 2/13/20  Yes OsOLIVER Freire CNP   albuterol (PROVENTIL) (2.5 MG/3ML) 0.083% nebulizer solution USE THREE MILLILITERS VIA NEBULIZATION BY MOUTH EVERY 4 HOURS AS NEEDED FOR WHEEZING OR FOR SHORTNESS OF BREATH  Patient not taking: No sig reported 11/30/21   OLIVER Sahu CNP   fluticasone (FLONASE) 50 MCG/ACT nasal spray 1 spray by Each Nostril route daily  Patient not taking: No sig reported 3/26/21   Precious Vigil EllieOLIVER sequeira CNP   aspirin 81 MG chewable tablet Take 81 mg by mouth daily  Patient not taking: No sig reported    Historical Provider, MD        Allergies:       Patient has no known allergies. Social History:     Tobacco:    reports that she quit smoking about 2 years ago. Her smoking use included cigarettes. She has a 30.00 pack-year smoking history. She has never used smokeless tobacco.  Alcohol:      reports no history of alcohol use. Drug Use:  reports no history of drug use. Family History:     Family History   Adopted: Yes   Problem Relation Age of Onset    Asthma Mother     Cancer Mother         Skin    Depression Mother     Hearing Loss Mother     High Cholesterol Mother     High Blood Pressure Mother     Osteoporosis Mother     Stroke Mother        Review of Systems:     Positive and Negative as described in HPI    Review of Systems   Constitutional:  Positive for appetite change and fatigue. Negative for fever. HENT:  Positive for congestion, ear pain, rhinorrhea, sinus pressure, sinus pain and sore throat. Eyes: Negative. Respiratory:  Positive for cough, shortness of breath and wheezing. Cardiovascular: Negative. Gastrointestinal: Negative. Endocrine: Negative. Genitourinary: Negative. Musculoskeletal:  Positive for arthralgias. Skin: Negative. Allergic/Immunologic: Positive for environmental allergies. Neurological:  Positive for headaches. Hematological: Negative. Psychiatric/Behavioral: Negative.        Physical Exam:   Vitals:  /84   Pulse (!) 114   Temp 97 °F (36.1 °C) (Temporal)   Resp 18   Wt 232 lb (105.2 kg)   SpO2 97%   BMI 42.43 kg/m²     Physical Exam  Vitals and nursing note reviewed. Constitutional:       Appearance: Normal appearance. HENT:      Head: Normocephalic. Right Ear: Tympanic membrane normal.      Left Ear: Tympanic membrane normal.      Ears:      Comments: Clear fluid bilaterally      Nose: Congestion and rhinorrhea present. Rhinorrhea is clear. Right Turbinates: Swollen. Left Turbinates: Swollen (turbinates boggy). Mouth/Throat:      Lips: Pink. Mouth: Mucous membranes are moist.      Pharynx: Posterior oropharyngeal erythema present. Tonsils: No tonsillar exudate. 1+ on the right. 1+ on the left. Eyes:      Conjunctiva/sclera: Conjunctivae normal.      Pupils: Pupils are equal, round, and reactive to light. Cardiovascular:      Rate and Rhythm: Regular rhythm. Tachycardia present. Heart sounds: Normal heart sounds. Pulmonary:      Effort: Prolonged expiration present. Breath sounds: Normal air entry. Examination of the right-lower field reveals decreased breath sounds. Examination of the left-lower field reveals decreased breath sounds. Decreased breath sounds present. No wheezing. Comments: Harsh coughing with deep breathing. Musculoskeletal:         General: Normal range of motion. Cervical back: Normal range of motion. Lymphadenopathy:      Cervical: Cervical adenopathy (anterior) present. Skin:     General: Skin is warm. Capillary Refill: Capillary refill takes less than 2 seconds. Neurological:      General: No focal deficit present. Mental Status: She is alert and oriented to person, place, and time.    Psychiatric:         Mood and Affect: Mood normal.         Behavior: Behavior normal.       Data:     Lab Results   Component Value Date/Time     03/24/2022 09:36 AM    K 4.6 03/24/2022 09:36 AM     03/24/2022 09:36 AM    CO2 24 03/24/2022 09:36 AM    BUN 16 03/24/2022 09:36 AM    CREATININE 0.97 03/24/2022 09:36 AM    GLUCOSE 142 03/24/2022 09:36 AM    PROT 6.6 10/16/2019 05:43 AM    LABALBU 3.6 10/16/2019 05:43 AM    BILITOT 0.35 10/16/2019 05:43 AM    ALKPHOS 75 10/16/2019 05:43 AM    AST 40 03/24/2022 09:36 AM    ALT 53 03/24/2022 09:36 AM     Lab Results   Component Value Date/Time    WBC 7.0 03/24/2022 09:36 AM    RBC 5.00 03/24/2022 09:36 AM    HGB 14.1 03/24/2022 09:36 AM    HCT 45.1 03/24/2022 09:36 AM    MCV 90.2 03/24/2022 09:36 AM    MCH 28.2 03/24/2022 09:36 AM    MCHC 31.3 03/24/2022 09:36 AM    RDW 13.7 03/24/2022 09:36 AM     03/24/2022 09:36 AM    MPV 9.6 03/24/2022 09:36 AM     Lab Results   Component Value Date/Time    TSH 3.33 09/06/2018 08:00 AM     Lab Results   Component Value Date/Time    CHOL 240 03/24/2022 09:36 AM    HDL 58 03/24/2022 09:36 AM    LABA1C 5.6 03/29/2022 10:35 AM       Assessment/Plan:      Diagnosis Orders   1. COPD with acute exacerbation (HCC)  azithromycin (ZITHROMAX) 250 MG tablet    predniSONE (DELTASONE) 20 MG tablet      2. Bronchitis with bronchospasm  predniSONE (DELTASONE) 20 MG tablet    benzonatate (TESSALON) 100 MG capsule      3. Pharyngitis, unspecified etiology  POCT rapid strep A      4. Need for influenza vaccination          Practice meticulous handwashing and cover cough to prevent spread of infection  Encouraged to increase fluids and rest  Tylenol/Ibuprofen OTC PRN for pain, discomfort or fever as directed on package  Warm salt water gargles for sore throat  Cool mist humidifier  Continue OTC Mucinex as directed. Hot tea with honey and lemon for cough and sore throat PRN  Zithromax and Prednisone as prescribed  Tessalon as needed. 1.  Renata received counseling on the following healthy behaviors: nutrition, exercise, and medication adherence  2. Patient given educational materials - see patient instructions  3. Was a self-tracking handout given in paper form or via [employer]? No  If yes, see orders or list here.   4.  Discussed use, benefit, and side effects of prescribed medications. Barriers to medication compliance addressed. All patient questions answered. Pt voiced understanding. 5.  Reviewed prior labs and health maintenance  6. Continue current medications, diet and exercise. Completed Refills   Requested Prescriptions     Signed Prescriptions Disp Refills    azithromycin (ZITHROMAX) 250 MG tablet 6 tablet 0     Sig: Take 1 tablet by mouth See Admin Instructions for 5 days 500mg on day 1 followed by 250mg on days 2 - 5    predniSONE (DELTASONE) 20 MG tablet 10 tablet 0     Sig: Take 1 tablet by mouth 2 times daily for 5 days    benzonatate (TESSALON) 100 MG capsule 60 capsule 0     Sig: Take 1-2 capsules by mouth 3 times daily as needed for Cough         No follow-ups on file.

## 2022-10-06 NOTE — PATIENT INSTRUCTIONS
SURVEY:     You may be receiving a survey from [employer] regarding your visit today. Please complete the survey to enable us to provide the highest quality of care to you and your family. If you cannot score us a very good on any question, please call the office to discuss how we could have made your experience a very good one.      Thank you,    Gustavo Salgado, APRN-CNP  Chiquis West, APRN-CNP  [place], RADHA Potter, MARIBEL Sanchez, MARIBEL Knight, CMA  Sofia, PCA  Vivian, PM

## 2022-12-15 ENCOUNTER — OFFICE VISIT (OUTPATIENT)
Dept: PRIMARY CARE CLINIC | Age: 62
End: 2022-12-15
Payer: MEDICARE

## 2022-12-15 VITALS
SYSTOLIC BLOOD PRESSURE: 136 MMHG | RESPIRATION RATE: 18 BRPM | HEART RATE: 129 BPM | TEMPERATURE: 98.8 F | OXYGEN SATURATION: 97 % | BODY MASS INDEX: 42.21 KG/M2 | DIASTOLIC BLOOD PRESSURE: 84 MMHG | WEIGHT: 230.8 LBS

## 2022-12-15 DIAGNOSIS — J44.1 COPD WITH ACUTE EXACERBATION (HCC): Primary | ICD-10-CM

## 2022-12-15 DIAGNOSIS — Z23 NEED FOR INFLUENZA VACCINATION: ICD-10-CM

## 2022-12-15 DIAGNOSIS — J06.9 UPPER RESPIRATORY TRACT INFECTION, UNSPECIFIED TYPE: ICD-10-CM

## 2022-12-15 LAB
INFLUENZA A ANTIBODY: NORMAL
INFLUENZA B ANTIBODY: NORMAL

## 2022-12-15 PROCEDURE — 1036F TOBACCO NON-USER: CPT | Performed by: NURSE PRACTITIONER

## 2022-12-15 PROCEDURE — 99214 OFFICE O/P EST MOD 30 MIN: CPT | Performed by: NURSE PRACTITIONER

## 2022-12-15 PROCEDURE — 3078F DIAST BP <80 MM HG: CPT | Performed by: NURSE PRACTITIONER

## 2022-12-15 PROCEDURE — G8427 DOCREV CUR MEDS BY ELIG CLIN: HCPCS | Performed by: NURSE PRACTITIONER

## 2022-12-15 PROCEDURE — 87804 INFLUENZA ASSAY W/OPTIC: CPT | Performed by: NURSE PRACTITIONER

## 2022-12-15 PROCEDURE — 3023F SPIROM DOC REV: CPT | Performed by: NURSE PRACTITIONER

## 2022-12-15 PROCEDURE — G8482 FLU IMMUNIZE ORDER/ADMIN: HCPCS | Performed by: NURSE PRACTITIONER

## 2022-12-15 PROCEDURE — G8417 CALC BMI ABV UP PARAM F/U: HCPCS | Performed by: NURSE PRACTITIONER

## 2022-12-15 PROCEDURE — 3017F COLORECTAL CA SCREEN DOC REV: CPT | Performed by: NURSE PRACTITIONER

## 2022-12-15 PROCEDURE — 3074F SYST BP LT 130 MM HG: CPT | Performed by: NURSE PRACTITIONER

## 2022-12-15 PROCEDURE — 90674 CCIIV4 VAC NO PRSV 0.5 ML IM: CPT | Performed by: NURSE PRACTITIONER

## 2022-12-15 PROCEDURE — 90471 IMMUNIZATION ADMIN: CPT | Performed by: NURSE PRACTITIONER

## 2022-12-15 RX ORDER — AZITHROMYCIN 250 MG/1
250 TABLET, FILM COATED ORAL SEE ADMIN INSTRUCTIONS
Qty: 6 TABLET | Refills: 0 | Status: SHIPPED | OUTPATIENT
Start: 2022-12-15 | End: 2022-12-20

## 2022-12-15 RX ORDER — PREDNISONE 20 MG/1
20 TABLET ORAL 2 TIMES DAILY
Qty: 10 TABLET | Refills: 0 | Status: SHIPPED | OUTPATIENT
Start: 2022-12-15 | End: 2022-12-20

## 2022-12-15 RX ORDER — GUAIFENESIN AND CODEINE PHOSPHATE 100; 10 MG/5ML; MG/5ML
10 SOLUTION ORAL 3 TIMES DAILY PRN
Qty: 90 ML | Refills: 0 | Status: SHIPPED | OUTPATIENT
Start: 2022-12-15 | End: 2022-12-20

## 2022-12-15 ASSESSMENT — PATIENT HEALTH QUESTIONNAIRE - PHQ9
6. FEELING BAD ABOUT YOURSELF - OR THAT YOU ARE A FAILURE OR HAVE LET YOURSELF OR YOUR FAMILY DOWN: 0
SUM OF ALL RESPONSES TO PHQ QUESTIONS 1-9: 0
SUM OF ALL RESPONSES TO PHQ QUESTIONS 1-9: 0
1. LITTLE INTEREST OR PLEASURE IN DOING THINGS: 0
3. TROUBLE FALLING OR STAYING ASLEEP: 0
SUM OF ALL RESPONSES TO PHQ QUESTIONS 1-9: 0
2. FEELING DOWN, DEPRESSED OR HOPELESS: 0
4. FEELING TIRED OR HAVING LITTLE ENERGY: 0
SUM OF ALL RESPONSES TO PHQ9 QUESTIONS 1 & 2: 0
9. THOUGHTS THAT YOU WOULD BE BETTER OFF DEAD, OR OF HURTING YOURSELF: 0
5. POOR APPETITE OR OVEREATING: 0
10. IF YOU CHECKED OFF ANY PROBLEMS, HOW DIFFICULT HAVE THESE PROBLEMS MADE IT FOR YOU TO DO YOUR WORK, TAKE CARE OF THINGS AT HOME, OR GET ALONG WITH OTHER PEOPLE: 0
7. TROUBLE CONCENTRATING ON THINGS, SUCH AS READING THE NEWSPAPER OR WATCHING TELEVISION: 0
8. MOVING OR SPEAKING SO SLOWLY THAT OTHER PEOPLE COULD HAVE NOTICED. OR THE OPPOSITE, BEING SO FIGETY OR RESTLESS THAT YOU HAVE BEEN MOVING AROUND A LOT MORE THAN USUAL: 0
SUM OF ALL RESPONSES TO PHQ QUESTIONS 1-9: 0

## 2022-12-15 ASSESSMENT — ENCOUNTER SYMPTOMS
RHINORRHEA: 1
EYES NEGATIVE: 1
VOMITING: 1
SHORTNESS OF BREATH: 1
WHEEZING: 1
SINUS PRESSURE: 0
CHEST TIGHTNESS: 1
COUGH: 1
SINUS PAIN: 0
ALLERGIC/IMMUNOLOGIC NEGATIVE: 1
SORE THROAT: 1

## 2022-12-15 NOTE — PATIENT INSTRUCTIONS
SURVEY:     You may be receiving a survey from [employer] regarding your visit today. Please complete the survey to enable us to provide the highest quality of care to you and your family. If you cannot score us a very good on any question, please call the office to discuss how we could have made your experience a very good one.      Thank you,    Bhupinder Salgado, APRN-JONATHAN Núñez, APRN-CNP  Pedro Krueger, RADHA Valerio, MARIBEL eBavers, CMA  Antonia, CMA  Sofia, PCA  Vivian, PM

## 2022-12-15 NOTE — PROGRESS NOTES
Vaccine Information Sheet, \"Influenza - Inactivated\"  given to Jeison Ma, or parent/legal guardian of  Jeison Ma and verbalized understanding. Patient responses:    Have you ever had a reaction to a flu vaccine? No  Are you able to eat eggs without adverse effects? Yes  Do you have any current illness? No  Have you ever had Guillian Addington Syndrome? No    Flu vaccine given per order. Please see immunization tab.

## 2022-12-15 NOTE — PROGRESS NOTES
[facility]  [employer], [address] [facility] PRIMARY CARE  [address] [facility] [address] [place] [address]  Dept: [phone]  Dept Fax: [phone]      Name: Jacquelyn Bingham  : 1960         Chief Complaint:     Chief Complaint   Patient presents with    Cough     X 3 days. Headache     X 3 days. Congestion     X 3 days. History of Present Illness:      Jacquelyn Bingham is a 60 y.o.  female who presents with Cough (X 3 days. ), Headache (X 3 days. ), and Congestion (X 3 days. )    Myla Carpio is here today for complaints of cough and congestion. She started 2 days ago with a little cough and it has been worsening. She states her cough has been dry and productive. Cough is keeping her awake at night and makes her vomit at times. She states she is wheezing and has shortness of breath all the time. She has a headache. She is having a little rhinorrhea. She is having some diarrhea. No fever today. She has not taken any OTC medications today. She took nighttime Mucinex last night. She is asking for Influenza vaccine today. Past Medical History:     Past Medical History:   Diagnosis Date    Anxiety     Broken bones     Depression     Gallbladder & bile duct stone with obstruction     Hyperlipidemia     Hypertension     Numbness and tingling     generalized    Stroke (cerebrum) ([facility] [address] 2018      Reviewed all health maintenance requirements and ordered appropriate tests  Health Maintenance Due   Topic Date Due    Low dose CT lung screening  2022       Past Surgical History:     Past Surgical History:   Procedure Laterality Date    ABDOMEN SURGERY      repair of bile duct. CHOLECYSTECTOMY      COLONOSCOPY      ENDOSCOPY, COLON, DIAGNOSTIC      HERNIA REPAIR      HYSTERECTOMY (CERVIX STATUS UNKNOWN)      uterine prolapse, ovaries remain.     CA COLON CA SCRN NOT HI RSK IND N/A 2018    -normal    TONSILLECTOMY AND ADENOIDECTOMY UPPER GASTROINTESTINAL ENDOSCOPY  07/09/2020    UPPER GASTROINTESTINAL ENDOSCOPY N/A 7/9/2020    EGD [facility]SY [facility]        Medications:       Prior to Admission medications    Medication Sig Start Date End Date Taking? Authorizing Provider   azithromycin (ZITHROMAX) 250 MG tablet Take 1 tablet by mouth See Admin Instructions for 5 days 500mg on day 1 followed by 250mg on days 2 - 5 12/15/22 12/20/22 Yes OLIVER Pedersen CNP   predniSONE (DELTASONE) 20 MG tablet Take 1 tablet by mouth 2 times daily for 5 days 12/15/22 12/20/22 Yes OLIVER Pedersen CNP   guaiFENesin-codeine (CHERATUSSIN AC) 100-10 MG/5ML syrup Take 10 mLs by mouth 3 times daily as needed for Cough or Congestion for up to 5 days.  12/15/22 12/20/22 Yes OLIVER Pedersen CNP   sertraline (ZOLOFT) 100 MG tablet TAKE ONE TABLET BY MOUTH DAILY 9/15/22  Yes OLIVER Wisdom CNP   buPROPion (WELLBUTRIN XL) 300 MG extended release tablet TAKE ONE TABLET BY MOUTH EVERY MORNING 9/15/22  Yes OLIVER Wisdom CNP   dilTIAZem (TIAZAC) 180 MG extended release capsule TAKE ONE CAPSULE BY MOUTH DAILY 8/4/22  Yes OLIVER Pedersen CNP   atenolol (TENORMIN) 25 MG tablet TAKE ONE TABLET BY MOUTH DAILY 6/15/22  Yes OLIVER Wisdom CNP   omeprazole (PRILOSEC) 20 MG delayed release capsule TAKE ONE CAPSULE BY MOUTH DAILY 2/7/22  Yes OLIVER Wisdom CNP   albuterol sulfate  (90 Base) MCG/ACT inhaler Inhale 2 puffs into the lungs every 6 hours as needed for Wheezing or Shortness of Breath 7/1/21  Yes Alyssa Munson MD   budesonide-formoterol (SYMBICORT) 160-4.5 MCG/ACT AERO Inhale 2 puffs into the lungs 2 times daily 7/1/21  Yes Alyssa Munson MD   tiotropium (SPIRIVA RESPIMAT) 2.5 MCG/ACT AERS inhaler Inhale 2 puffs into the lungs daily 7/1/21  Yes Alyssa Munson MD   Respiratory Therapy Supplies (NEBULIZER/TUBING/MOUTHPIECE) KIT 1 kit by Does not apply route daily as needed (wheezing, sob) 2/13/20  Yes Cannon Goldmann Might, APRN - CNP   albuterol (PROVENTIL) (2.5 MG/3ML) 0.083% nebulizer solution USE THREE MILLILITERS VIA NEBULIZATION BY MOUTH EVERY 4 HOURS AS NEEDED FOR WHEEZING OR FOR SHORTNESS OF BREATH  Patient not taking: No sig reported 11/30/21   Cannon Goldmann MightOLIVER - CNP   fluticasone (FLONASE) 50 MCG/ACT nasal spray 1 spray by Each Nostril route daily  Patient not taking: No sig reported 3/26/21   OLIVER Trejo CNP   aspirin 81 MG chewable tablet Take 81 mg by mouth daily  Patient not taking: No sig reported    Historical Provider, MD        Allergies:       Patient has no known allergies. Social History:     Tobacco:    reports that she quit smoking about 3 years ago. Her smoking use included cigarettes. She has a 30.00 pack-year smoking history. She has never used smokeless tobacco.  Alcohol:      reports no history of alcohol use. Drug Use:  reports no history of drug use. Family History:     Family History   Adopted: Yes   Problem Relation Age of Onset    Asthma Mother     Cancer Mother         Skin    Depression Mother     Hearing Loss Mother     High Cholesterol Mother     High Blood Pressure Mother     Osteoporosis Mother     Stroke Mother        Review of Systems:     Positive and Negative as described in HPI    Review of Systems   Constitutional:  Positive for fatigue. Negative for fever. HENT:  Positive for congestion, rhinorrhea and sore throat (from coughing). Negative for ear pain, sinus pressure and sinus pain. Eyes: Negative. Respiratory:  Positive for cough, chest tightness, shortness of breath and wheezing. Cardiovascular: Negative. Gastrointestinal:  Positive for vomiting (with coughing). Endocrine: Negative. Genitourinary: Negative. Musculoskeletal:  Positive for arthralgias. Skin: Negative. Allergic/Immunologic: Negative. Neurological:  Positive for headaches. Hematological: Negative.     Psychiatric/Behavioral: Negative. Physical Exam:   Vitals:  /84   Pulse (!) 129   Temp 98.8 °F (37.1 °C) (Temporal)   Resp 18   Wt 230 lb 12.8 oz (104.7 kg)   SpO2 97%   BMI 42.21 kg/m²     Physical Exam  Vitals and nursing note reviewed. Constitutional:       Appearance: Normal appearance. She is ill-appearing. HENT:      Head: Normocephalic. Right Ear: Tympanic membrane normal.      Left Ear: Tympanic membrane normal.      Nose: Rhinorrhea present. Rhinorrhea is clear. Mouth/Throat:      Lips: Pink. Mouth: Mucous membranes are moist.      Pharynx: Oropharynx is clear. Posterior oropharyngeal erythema present. Tonsils: No tonsillar exudate. Eyes:      Conjunctiva/sclera: Conjunctivae normal.      Pupils: Pupils are equal, round, and reactive to light. Cardiovascular:      Rate and Rhythm: Normal rate and regular rhythm. Heart sounds: Normal heart sounds. No murmur heard. Pulmonary:      Effort: Prolonged expiration present. Breath sounds: Examination of the right-upper field reveals wheezing. Examination of the left-upper field reveals wheezing. Examination of the right-lower field reveals wheezing. Examination of the left-lower field reveals wheezing. Wheezing present. Comments: Harsh cough with deep inspirations and during exam.  Expiratory wheezing increased with coughing. Musculoskeletal:         General: Normal range of motion. Cervical back: Normal range of motion and neck supple. Lymphadenopathy:      Cervical: Cervical adenopathy (anterior) present. Skin:     General: Skin is warm. Capillary Refill: Capillary refill takes less than 2 seconds. Neurological:      General: No focal deficit present. Mental Status: She is alert and oriented to person, place, and time. Psychiatric:         Mood and Affect: Mood normal.         Behavior: Behavior normal.         Thought Content:  Thought content normal.       Data:     Lab Results   Component Value Date/Time     03/24/2022 09:36 AM    K 4.6 03/24/2022 09:36 AM     03/24/2022 09:36 AM    CO2 24 03/24/2022 09:36 AM    BUN 16 03/24/2022 09:36 AM    CREATININE 0.97 03/24/2022 09:36 AM    GLUCOSE 142 03/24/2022 09:36 AM    PROT 6.6 10/16/2019 05:43 AM    LABALBU 3.6 10/16/2019 05:43 AM    BILITOT 0.35 10/16/2019 05:43 AM    ALKPHOS 75 10/16/2019 05:43 AM    AST 40 03/24/2022 09:36 AM    ALT 53 03/24/2022 09:36 AM     Lab Results   Component Value Date/Time    WBC 7.0 03/24/2022 09:36 AM    RBC 5.00 03/24/2022 09:36 AM    HGB 14.1 03/24/2022 09:36 AM    HCT 45.1 03/24/2022 09:36 AM    MCV 90.2 03/24/2022 09:36 AM    MCH 28.2 03/24/2022 09:36 AM    MCHC 31.3 03/24/2022 09:36 AM    RDW 13.7 03/24/2022 09:36 AM     03/24/2022 09:36 AM    MPV 9.6 03/24/2022 09:36 AM     Lab Results   Component Value Date/Time    TSH 3.33 09/06/2018 08:00 AM     Lab Results   Component Value Date/Time    CHOL 240 03/24/2022 09:36 AM    HDL 58 03/24/2022 09:36 AM    LABA1C 5.6 03/29/2022 10:35 AM       Assessment/Plan:      Diagnosis Orders   1. COPD with acute exacerbation (HCC)  POCT Influenza A/B    azithromycin (ZITHROMAX) 250 MG tablet    predniSONE (DELTASONE) 20 MG tablet    guaiFENesin-codeine (CHERATUSSIN AC) 100-10 MG/5ML syrup      2. Upper respiratory tract infection, unspecified type  POCT Influenza A/B    guaiFENesin-codeine (CHERATUSSIN AC) 100-10 MG/5ML syrup      3. Need for influenza vaccination  Influenza, FLUCELVAX, (age 10 mo+), IM, Preservative Free, 0.5 mL        Practice meticulous handwashing and cover cough to prevent spread of infection  Encouraged to increase fluids and rest  Tylenol/Ibuprofen OTC PRN for pain, discomfort or fever as directed on package  Warm salt water gargles for sore throat  Cool mist humidifier  Hot tea with honey and lemon for cough and sore throat PRN  Azithromycin and Prednisone as prescribed. Cheratussin as needed for cough.   Will give Influenza vaccine as requested if Influenza is negative in office today. Controlled Substance Monitoring:    Acute and Chronic Pain Monitoring:   RX Monitoring 12/15/2022   Attestation -   Periodic Controlled Substance Monitoring No signs of potential drug abuse or diversion identified. 1.  Renata received counseling on the following healthy behaviors: nutrition, exercise, and medication adherence  2. Patient given educational materials - see patient instructions  3. Was a self-tracking handout given in paper form or via [employer]? No  If yes, see orders or list here. 4.  Discussed use, benefit, and side effects of prescribed medications. Barriers to medication compliance addressed. All patient questions answered. Pt voiced understanding. 5.  Reviewed prior labs and health maintenance  6. Continue current medications, diet and exercise. Completed Refills   Requested Prescriptions     Signed Prescriptions Disp Refills    azithromycin (ZITHROMAX) 250 MG tablet 6 tablet 0     Sig: Take 1 tablet by mouth See Admin Instructions for 5 days 500mg on day 1 followed by 250mg on days 2 - 5    predniSONE (DELTASONE) 20 MG tablet 10 tablet 0     Sig: Take 1 tablet by mouth 2 times daily for 5 days    guaiFENesin-codeine (CHERATUSSIN AC) 100-10 MG/5ML syrup 90 mL 0     Sig: Take 10 mLs by mouth 3 times daily as needed for Cough or Congestion for up to 5 days. No follow-ups on file.

## 2023-02-15 DIAGNOSIS — R13.10 DYSPHAGIA, UNSPECIFIED TYPE: ICD-10-CM

## 2023-02-15 RX ORDER — OMEPRAZOLE 20 MG/1
CAPSULE, DELAYED RELEASE ORAL
Qty: 30 CAPSULE | OUTPATIENT
Start: 2023-02-15

## 2023-02-23 ENCOUNTER — OFFICE VISIT (OUTPATIENT)
Dept: PRIMARY CARE CLINIC | Age: 63
End: 2023-02-23
Payer: MEDICARE

## 2023-02-23 VITALS
DIASTOLIC BLOOD PRESSURE: 84 MMHG | SYSTOLIC BLOOD PRESSURE: 122 MMHG | HEIGHT: 62 IN | TEMPERATURE: 98 F | OXYGEN SATURATION: 98 % | BODY MASS INDEX: 41.99 KG/M2 | WEIGHT: 228.2 LBS | HEART RATE: 87 BPM | RESPIRATION RATE: 18 BRPM

## 2023-02-23 DIAGNOSIS — J44.1 COPD WITH ACUTE EXACERBATION (HCC): Primary | ICD-10-CM

## 2023-02-23 DIAGNOSIS — J01.40 ACUTE NON-RECURRENT PANSINUSITIS: ICD-10-CM

## 2023-02-23 PROCEDURE — 3079F DIAST BP 80-89 MM HG: CPT | Performed by: NURSE PRACTITIONER

## 2023-02-23 PROCEDURE — G8427 DOCREV CUR MEDS BY ELIG CLIN: HCPCS | Performed by: NURSE PRACTITIONER

## 2023-02-23 PROCEDURE — G8482 FLU IMMUNIZE ORDER/ADMIN: HCPCS | Performed by: NURSE PRACTITIONER

## 2023-02-23 PROCEDURE — 3074F SYST BP LT 130 MM HG: CPT | Performed by: NURSE PRACTITIONER

## 2023-02-23 PROCEDURE — 3017F COLORECTAL CA SCREEN DOC REV: CPT | Performed by: NURSE PRACTITIONER

## 2023-02-23 PROCEDURE — 1036F TOBACCO NON-USER: CPT | Performed by: NURSE PRACTITIONER

## 2023-02-23 PROCEDURE — G8417 CALC BMI ABV UP PARAM F/U: HCPCS | Performed by: NURSE PRACTITIONER

## 2023-02-23 PROCEDURE — 3023F SPIROM DOC REV: CPT | Performed by: NURSE PRACTITIONER

## 2023-02-23 PROCEDURE — 99214 OFFICE O/P EST MOD 30 MIN: CPT | Performed by: NURSE PRACTITIONER

## 2023-02-23 RX ORDER — BENZONATATE 100 MG/1
100-200 CAPSULE ORAL 3 TIMES DAILY PRN
Qty: 60 CAPSULE | Refills: 0 | Status: SHIPPED | OUTPATIENT
Start: 2023-02-23 | End: 2023-03-02

## 2023-02-23 RX ORDER — AMOXICILLIN AND CLAVULANATE POTASSIUM 875; 125 MG/1; MG/1
1 TABLET, FILM COATED ORAL 2 TIMES DAILY
Qty: 20 TABLET | Refills: 0 | Status: SHIPPED | OUTPATIENT
Start: 2023-02-23 | End: 2023-03-05

## 2023-02-23 RX ORDER — GUAIFENESIN AND CODEINE PHOSPHATE 100; 10 MG/5ML; MG/5ML
10 SOLUTION ORAL 3 TIMES DAILY PRN
Qty: 90 ML | Refills: 0 | Status: SHIPPED | OUTPATIENT
Start: 2023-02-23 | End: 2023-02-28

## 2023-02-23 RX ORDER — PREDNISONE 20 MG/1
20 TABLET ORAL 2 TIMES DAILY
Qty: 10 TABLET | Refills: 0 | Status: SHIPPED | OUTPATIENT
Start: 2023-02-23 | End: 2023-02-28

## 2023-02-23 SDOH — ECONOMIC STABILITY: INCOME INSECURITY: HOW HARD IS IT FOR YOU TO PAY FOR THE VERY BASICS LIKE FOOD, HOUSING, MEDICAL CARE, AND HEATING?: NOT HARD AT ALL

## 2023-02-23 SDOH — ECONOMIC STABILITY: HOUSING INSECURITY
IN THE LAST 12 MONTHS, WAS THERE A TIME WHEN YOU DID NOT HAVE A STEADY PLACE TO SLEEP OR SLEPT IN A SHELTER (INCLUDING NOW)?: NO

## 2023-02-23 SDOH — ECONOMIC STABILITY: FOOD INSECURITY: WITHIN THE PAST 12 MONTHS, YOU WORRIED THAT YOUR FOOD WOULD RUN OUT BEFORE YOU GOT MONEY TO BUY MORE.: NEVER TRUE

## 2023-02-23 SDOH — ECONOMIC STABILITY: FOOD INSECURITY: WITHIN THE PAST 12 MONTHS, THE FOOD YOU BOUGHT JUST DIDN'T LAST AND YOU DIDN'T HAVE MONEY TO GET MORE.: NEVER TRUE

## 2023-02-23 ASSESSMENT — PATIENT HEALTH QUESTIONNAIRE - PHQ9
9. THOUGHTS THAT YOU WOULD BE BETTER OFF DEAD, OR OF HURTING YOURSELF: 0
SUM OF ALL RESPONSES TO PHQ QUESTIONS 1-9: 0
SUM OF ALL RESPONSES TO PHQ9 QUESTIONS 1 & 2: 0
5. POOR APPETITE OR OVEREATING: 0
4. FEELING TIRED OR HAVING LITTLE ENERGY: 0
SUM OF ALL RESPONSES TO PHQ QUESTIONS 1-9: 0
8. MOVING OR SPEAKING SO SLOWLY THAT OTHER PEOPLE COULD HAVE NOTICED. OR THE OPPOSITE, BEING SO FIGETY OR RESTLESS THAT YOU HAVE BEEN MOVING AROUND A LOT MORE THAN USUAL: 0
SUM OF ALL RESPONSES TO PHQ QUESTIONS 1-9: 0
3. TROUBLE FALLING OR STAYING ASLEEP: 0
2. FEELING DOWN, DEPRESSED OR HOPELESS: 0
6. FEELING BAD ABOUT YOURSELF - OR THAT YOU ARE A FAILURE OR HAVE LET YOURSELF OR YOUR FAMILY DOWN: 0
7. TROUBLE CONCENTRATING ON THINGS, SUCH AS READING THE NEWSPAPER OR WATCHING TELEVISION: 0
10. IF YOU CHECKED OFF ANY PROBLEMS, HOW DIFFICULT HAVE THESE PROBLEMS MADE IT FOR YOU TO DO YOUR WORK, TAKE CARE OF THINGS AT HOME, OR GET ALONG WITH OTHER PEOPLE: 0
SUM OF ALL RESPONSES TO PHQ QUESTIONS 1-9: 0
1. LITTLE INTEREST OR PLEASURE IN DOING THINGS: 0

## 2023-02-23 ASSESSMENT — ENCOUNTER SYMPTOMS
RHINORRHEA: 1
GASTROINTESTINAL NEGATIVE: 1
ALLERGIC/IMMUNOLOGIC NEGATIVE: 1
SINUS PRESSURE: 1
COUGH: 1
SINUS PAIN: 1
EYES NEGATIVE: 1
SHORTNESS OF BREATH: 1
WHEEZING: 1

## 2023-02-23 NOTE — PROGRESS NOTES
[facility]  [facility] Night, [address] [facility] PRIMARY CARE  [address] [facility] [address] [place] [address]  Dept: [phone]  Dept Fax: [phone]      Name: Milan Ruiz  : 1960         Chief Complaint:     Chief Complaint   Patient presents with    Cough     X 4 days. Congestion     X 4 days. History of Present Illness:      Milan Ruiz is a 60 y.o.  female who presents with Cough (X 4 days. ) and Congestion (X 4 days. )    Yarely Woods is here today for cough and congestion for 5 days that is getting worse. She states the cough is keeping her up at night. She states cough is productive with purulent sputum. She has rhinorrhea and congestion. She is having some sinus pressure. Denies a fever. She denies a sore throat except with coughing. She denies ear pain. She is having wheezing and shortness of breath. She has tried Robitussin DM, Delsum, halls, Nyquil with no improvement. Her mother in law passed away and there has been a lot of sickness and stress in the family and she states she has gotten run down. 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 CHOLECYSTECTOMY      COLONOSCOPY      ENDOSCOPY, COLON, DIAGNOSTIC      HERNIA REPAIR      HYSTERECTOMY (CERVIX STATUS UNKNOWN)      uterine prolapse, ovaries remain.     [place] COLON CA SCRN NOT HI RSK IND N/A 2018    -normal    TONSILLECTOMY AND ADENOIDECTOMY      UPPER GASTROINTESTINAL ENDOSCOPY  07/09/2020    UPPER GASTROINTESTINAL ENDOSCOPY N/A 7/9/2020    EGD BIOPSY performed by Oxana Taveras MD [facility]        Medications:       Prior to Admission medications    Medication Sig Start Date End Date Taking? Authorizing Provider   predniSONE (DELTASONE) 20 MG tablet Take 1 tablet by mouth 2 times daily for 5 days 2/23/23 2/28/23 Yes OLIVER Rosen CNP   amoxicillin-clavulanate (AUGMENTIN) 875-125 MG per tablet Take 1 tablet by mouth 2 times daily for 10 days 2/23/23 3/5/23 Yes OLIVER Rosen CNP   benzonatate (TESSALON) 100 MG capsule Take 1-2 capsules by mouth 3 times daily as needed for Cough 2/23/23 3/2/23 Yes OLIVER Rosen CNP   guaiFENesin-codeine (TUSSI-ORGANIDIN NR) 100-10 MG/5ML syrup Take 10 mLs by mouth 3 times daily as needed for Cough for up to 5 days.  Max Daily Amount: 30 mLs 2/23/23 2/28/23 Yes OLIVER Rosen CNP   sertraline (ZOLOFT) 100 MG tablet TAKE ONE TABLET BY MOUTH DAILY 9/15/22  Yes OLIVER Bird CNP   buPROPion (WELLBUTRIN XL) 300 MG extended release tablet TAKE ONE TABLET BY MOUTH EVERY MORNING 9/15/22  Yes OLIVER Bird CNP   dilTIAZem (TIAZAC) 180 MG extended release capsule TAKE ONE CAPSULE BY MOUTH DAILY 8/4/22  Yes OLIVER Rosen CNP   atenolol (TENORMIN) 25 MG tablet TAKE ONE TABLET BY MOUTH DAILY 6/15/22  Yes OLIVER Bird CNP   omeprazole (PRILOSEC) 20 MG delayed release capsule TAKE ONE CAPSULE BY MOUTH DAILY 2/7/22  Yes OLIVER Bird CNP   albuterol (PROVENTIL) (2.5 MG/3ML) 0.083% nebulizer solution USE THREE MILLILITERS VIA NEBULIZATION BY MOUTH EVERY 4 HOURS AS NEEDED FOR WHEEZING OR FOR SHORTNESS OF BREATH 11/30/21  Yes Thomos Moose Might, APRN - CNP   albuterol sulfate  (90 Base) MCG/ACT inhaler Inhale 2 puffs into the lungs every 6 hours as needed for Wheezing or Shortness of Breath 7/1/21  Yes Dipika Silva MD   budesonide-formoterol (SYMBICORT) 160-4.5 MCG/ACT AERO Inhale 2 puffs into the lungs 2 times daily 7/1/21  Yes Linda Gipson MD   tiotropium (SPIRIVA RESPIMAT) 2.5 MCG/ACT AERS inhaler Inhale 2 puffs into the lungs daily 7/1/21  Yes Lnida Gipson MD   Respiratory Therapy Supplies (NEBULIZER/TUBING/MOUTHPIECE) KIT 1 kit by Does not apply route daily as needed (wheezing, sob) 2/13/20  Yes OLIVER Faye CNP   fluticasone (FLONASE) 50 MCG/ACT nasal spray 1 spray by Each Nostril route daily  Patient not taking: No sig reported 3/26/21   OLIVER Magaña CNP   aspirin 81 MG chewable tablet Take 81 mg by mouth daily  Patient not taking: No sig reported    Historical Provider, MD        Allergies:       Patient has no known allergies. Social History:     Tobacco:    reports that she quit smoking about 3 years ago. Her smoking use included cigarettes. She has a 30.00 pack-year smoking history. She has never used smokeless tobacco.  Alcohol:      reports no history of alcohol use. Drug Use:  reports no history of drug use. Family History:     Family History   Adopted: Yes   Problem Relation Age of Onset    Asthma Mother     Cancer Mother         Skin    Depression Mother     Hearing Loss Mother     High Cholesterol Mother     High Blood Pressure Mother     Osteoporosis Mother     Stroke Mother        Review of Systems:     Positive and Negative as described in HPI    Review of Systems   Constitutional:  Positive for fatigue. Negative for appetite change. HENT:  Positive for congestion, rhinorrhea, sinus pressure and sinus pain. Negative for ear pain. Eyes: Negative. Respiratory:  Positive for cough, shortness of breath and wheezing. Cardiovascular: Negative. Gastrointestinal: Negative. Endocrine: Negative. Genitourinary: Negative. Musculoskeletal: Negative. Skin: Negative. Allergic/Immunologic: Negative. Neurological: Negative. Hematological: Negative. Psychiatric/Behavioral: Negative.        Physical Exam:   Vitals:  /84   Pulse 87   Temp 98 °F (36.7 °C) (Temporal)   Resp 18   Ht 5' 2\" (1.575 m)   Wt 228 lb 3.2 oz (103.5 kg)   SpO2 98%   BMI 41.74 kg/m²     Physical Exam  Vitals and nursing note reviewed. Constitutional:       General: She is not in acute distress. Appearance: Normal appearance. HENT:      Head: Normocephalic. Right Ear: Tympanic membrane normal.      Left Ear: Tympanic membrane normal.      Ears:      Comments: Clear fluid bilaterally      Nose: Nasal tenderness, congestion and rhinorrhea present. Rhinorrhea is clear. Right Sinus: Maxillary sinus tenderness and frontal sinus tenderness present. Left Sinus: Maxillary sinus tenderness and frontal sinus tenderness present. Mouth/Throat:      Lips: Pink. Mouth: Mucous membranes are moist.      Pharynx: Oropharyngeal exudate and posterior oropharyngeal erythema present. Tonsils: No tonsillar exudate. Eyes:      Extraocular Movements: Extraocular movements intact. Conjunctiva/sclera: Conjunctivae normal.      Pupils: Pupils are equal, round, and reactive to light. Cardiovascular:      Rate and Rhythm: Normal rate and regular rhythm. Heart sounds: Normal heart sounds. No murmur heard. Pulmonary:      Effort: Pulmonary effort is normal. Prolonged expiration present. Breath sounds: Examination of the right-lower field reveals wheezing. Examination of the left-lower field reveals wheezing. Wheezing (scattered expiratory wheezes) present. Musculoskeletal:         General: Normal range of motion. Cervical back: Normal range of motion and neck supple. Lymphadenopathy:      Cervical: Cervical adenopathy (anterior) present. Skin:     General: Skin is warm. Capillary Refill: Capillary refill takes less than 2 seconds. Neurological:      General: No focal deficit present. Mental Status: She is alert and oriented to person, place, and time.    Psychiatric:         Mood and Affect: Mood normal.         Behavior: Behavior normal.         Thought Content: Thought content normal.       Data:     Lab Results   Component Value Date/Time     03/24/2022 09:36 AM    K 4.6 03/24/2022 09:36 AM     03/24/2022 09:36 AM    CO2 24 03/24/2022 09:36 AM    BUN 16 03/24/2022 09:36 AM    CREATININE 0.97 03/24/2022 09:36 AM    GLUCOSE 142 03/24/2022 09:36 AM    PROT 6.6 10/16/2019 05:43 AM    LABALBU 3.6 10/16/2019 05:43 AM    BILITOT 0.35 10/16/2019 05:43 AM    ALKPHOS 75 10/16/2019 05:43 AM    AST 40 03/24/2022 09:36 AM    ALT 53 03/24/2022 09:36 AM     Lab Results   Component Value Date/Time    WBC 7.0 03/24/2022 09:36 AM    RBC 5.00 03/24/2022 09:36 AM    HGB 14.1 03/24/2022 09:36 AM    HCT 45.1 03/24/2022 09:36 AM    MCV 90.2 03/24/2022 09:36 AM    MCH 28.2 03/24/2022 09:36 AM    MCHC 31.3 03/24/2022 09:36 AM    RDW 13.7 03/24/2022 09:36 AM     03/24/2022 09:36 AM    MPV 9.6 03/24/2022 09:36 AM     Lab Results   Component Value Date/Time    TSH 3.33 09/06/2018 08:00 AM     Lab Results   Component Value Date/Time    CHOL 240 03/24/2022 09:36 AM    HDL 58 03/24/2022 09:36 AM    LABA1C 5.6 03/29/2022 10:35 AM       Assessment/Plan:      Diagnosis Orders   1. COPD with acute exacerbation (HCC)  predniSONE (DELTASONE) 20 MG tablet    amoxicillin-clavulanate (AUGMENTIN) 875-125 MG per tablet    benzonatate (TESSALON) 100 MG capsule    guaiFENesin-codeine (TUSSI-ORGANIDIN NR) 100-10 MG/5ML syrup      2. Acute non-recurrent pansinusitis  amoxicillin-clavulanate (AUGMENTIN) 875-125 MG per tablet        Practice meticulous handwashing and cover cough to prevent spread of infection  Encouraged to increase fluids and rest  Tylenol/Ibuprofen OTC PRN for pain, discomfort or fever as directed on package  Warm salt water gargles for sore throat  Cool mist humidifier  Hot tea with honey and lemon for cough and sore throat PRN  Start Augmentin and Prednisone as prescribed.   Cheritussin if needed for increased cough at night. Tessalon as needed for cough. 1.  Renata received counseling on the following healthy behaviors: nutrition, exercise, and medication adherence  2. Patient given educational materials - see patient instructions  3. Was a self-tracking handout given in paper form or via [employer]? No  If yes, see orders or list here. 4.  Discussed use, benefit, and side effects of prescribed medications. Barriers to medication compliance addressed. All patient questions answered. Pt voiced understanding. 5.  Reviewed prior labs and health maintenance  6. Continue current medications, diet and exercise. Completed Refills   Requested Prescriptions     Signed Prescriptions Disp Refills    predniSONE (DELTASONE) 20 MG tablet 10 tablet 0     Sig: Take 1 tablet by mouth 2 times daily for 5 days    amoxicillin-clavulanate (AUGMENTIN) 875-125 MG per tablet 20 tablet 0     Sig: Take 1 tablet by mouth 2 times daily for 10 days    benzonatate (TESSALON) 100 MG capsule 60 capsule 0     Sig: Take 1-2 capsules by mouth 3 times daily as needed for Cough    guaiFENesin-codeine (TUSSI-ORGANIDIN NR) 100-10 MG/5ML syrup 90 mL 0     Sig: Take 10 mLs by mouth 3 times daily as needed for Cough for up to 5 days. Max Daily Amount: 30 mLs         No follow-ups on file.

## 2023-02-23 NOTE — PATIENT INSTRUCTIONS
SURVEY:     You may be receiving a survey from [employer] regarding your visit today. Please complete the survey to enable us to provide the highest quality of care to you and your family. If you cannot score us a very good on any question, please call the office to discuss how we could have made your experience a very good one.      Thank you,    Vonnie Salgado, APRN-CNP  Demarco Medina, APRN-CNP  La Nena Goode, RADHA Edwards, CMA  Belén Caruso, CMA  Antonia, CMA  Sofia, PCA  Vivian, PM

## 2023-03-06 DIAGNOSIS — R13.10 DYSPHAGIA, UNSPECIFIED TYPE: ICD-10-CM

## 2023-03-06 RX ORDER — OMEPRAZOLE 20 MG/1
CAPSULE, DELAYED RELEASE ORAL
Qty: 90 CAPSULE | Refills: 3 | Status: SHIPPED | OUTPATIENT
Start: 2023-03-06

## 2023-03-06 NOTE — TELEPHONE ENCOUNTER
Health Maintenance   Topic Date Due    Low dose CT lung screening  07/12/2022    Hepatitis C screen  08/29/2023 (Originally [birth year]/1977)    HIV screen  08/29/2023 (Originally 7/12/1974)    Shingles vaccine (1 of 2) 10/06/2023 (Originally 7/12/2009)    DTaP/Tdap/Td vaccine (1 - Tdap) 02/23/2024 (Originally 7/12/1978)    COVID-19 Vaccine ([facility] - [facility]) 02/23/2024 (Originally 6/1/2021)    Breast cancer screen  12/20/2023    Depression Monitoring  02/23/2024    Diabetes screen  03/29/2025    Lipids  03/24/2027    Colorectal Cancer Screen  09/25/2028    Flu vaccine  Completed    Pneumococcal 0-64 years Vaccine  Completed    Hepatitis A vaccine  Aged Out    Hib vaccine  Aged Out    Meningococcal (ACWY) vaccine  Aged Out             (applicable per patient's age: Cancer Screenings, Depression Screening, Fall Risk Screening, Immunizations)    Hemoglobin A1C (%)   Date Value   03/29/2022 5.6   12/10/2020 5.3   05/30/2019 5.3     Microalb/Crt. Ratio (mcg/mg creat)   Date Value   06/03/2020 CANNOT BE CALCULATED     LDL Cholesterol (mg/dL)   Date Value   03/24/2022 121     AST (U/L)   Date Value   03/24/2022 40 (H)     ALT (U/L)   Date Value   03/24/2022 53 (H)     BUN (mg/dL)   Date Value   03/24/2022 16      (goal A1C is < 7)   (goal LDL is <100) need 30-50% reduction from baseline     BP Readings from Last 3 Encounters:   02/23/23 122/84   12/15/22 136/84   10/06/22 128/84    (goal /80)      All Future Testing planned in [facility]:  Lab Frequency Next Occurrence   POCT Fecal Immunochemical Test (FIT) Once 04/19/2022   CT lung screen [Initial/Annual] Once 06/13/2022       Next Visit Date:  No future appointments.          Patient Active Problem List:     Moderate episode of recurrent major depressive disorder ([address]     Gastroesophageal reflux disease     Neurological symptoms     Acute diverticulitis     Anxiety     Depression     Hypertension     Hyperlipidemia     Morbidly obese [facility])     Former smoker Chronic obstructive pulmonary disease (HCC)     Dysphagia

## 2023-03-24 ENCOUNTER — OFFICE VISIT (OUTPATIENT)
Dept: PRIMARY CARE CLINIC | Age: 63
End: 2023-03-24
Payer: MEDICARE

## 2023-03-24 VITALS
HEART RATE: 100 BPM | BODY MASS INDEX: 42.44 KG/M2 | HEIGHT: 62 IN | OXYGEN SATURATION: 97 % | SYSTOLIC BLOOD PRESSURE: 136 MMHG | TEMPERATURE: 97.1 F | RESPIRATION RATE: 16 BRPM | WEIGHT: 230.6 LBS | DIASTOLIC BLOOD PRESSURE: 88 MMHG

## 2023-03-24 DIAGNOSIS — J44.0 ACUTE BRONCHITIS WITH COPD (HCC): Primary | ICD-10-CM

## 2023-03-24 DIAGNOSIS — J20.9 ACUTE BRONCHITIS WITH COPD (HCC): Primary | ICD-10-CM

## 2023-03-24 PROCEDURE — G8482 FLU IMMUNIZE ORDER/ADMIN: HCPCS | Performed by: NURSE PRACTITIONER

## 2023-03-24 PROCEDURE — 1036F TOBACCO NON-USER: CPT | Performed by: NURSE PRACTITIONER

## 2023-03-24 PROCEDURE — 3023F SPIROM DOC REV: CPT | Performed by: NURSE PRACTITIONER

## 2023-03-24 PROCEDURE — 99213 OFFICE O/P EST LOW 20 MIN: CPT | Performed by: NURSE PRACTITIONER

## 2023-03-24 PROCEDURE — 3079F DIAST BP 80-89 MM HG: CPT | Performed by: NURSE PRACTITIONER

## 2023-03-24 PROCEDURE — G8427 DOCREV CUR MEDS BY ELIG CLIN: HCPCS | Performed by: NURSE PRACTITIONER

## 2023-03-24 PROCEDURE — G8417 CALC BMI ABV UP PARAM F/U: HCPCS | Performed by: NURSE PRACTITIONER

## 2023-03-24 PROCEDURE — 3075F SYST BP GE 130 - 139MM HG: CPT | Performed by: NURSE PRACTITIONER

## 2023-03-24 PROCEDURE — 3017F COLORECTAL CA SCREEN DOC REV: CPT | Performed by: NURSE PRACTITIONER

## 2023-03-24 RX ORDER — DOXYCYCLINE HYCLATE 100 MG
100 TABLET ORAL 2 TIMES DAILY
Qty: 20 TABLET | Refills: 0 | Status: SHIPPED | OUTPATIENT
Start: 2023-03-24 | End: 2023-04-03

## 2023-03-24 RX ORDER — GUAIFENESIN AND CODEINE PHOSPHATE 100; 10 MG/5ML; MG/5ML
10 SOLUTION ORAL EVERY 4 HOURS PRN
Qty: 120 ML | Refills: 0 | Status: SHIPPED | OUTPATIENT
Start: 2023-03-24 | End: 2023-03-27

## 2023-03-24 RX ORDER — PREDNISONE 20 MG/1
40 TABLET ORAL DAILY
Qty: 10 TABLET | Refills: 0 | Status: SHIPPED | OUTPATIENT
Start: 2023-03-24 | End: 2023-03-29

## 2023-03-24 ASSESSMENT — ENCOUNTER SYMPTOMS
SHORTNESS OF BREATH: 0
COUGH: 1
RHINORRHEA: 0
HEARTBURN: 0
WHEEZING: 1
SORE THROAT: 0
HEMOPTYSIS: 0

## 2023-03-24 NOTE — PROGRESS NOTES
5' 2\" (1.575 m)   Wt 230 lb 9.6 oz (104.6 kg)   SpO2 97%   BMI 42.18 kg/m²     Physical Exam  Constitutional:       General: She is not in acute distress. Appearance: Normal appearance. She is obese. She is not ill-appearing. HENT:      Nose: Congestion present. Mouth/Throat:      Mouth: Mucous membranes are moist.      Pharynx: Posterior oropharyngeal erythema present. Eyes:      General: No scleral icterus. Conjunctiva/sclera: Conjunctivae normal.   Cardiovascular:      Rate and Rhythm: Normal rate and regular rhythm. Heart sounds: No murmur heard. Pulmonary:      Effort: Pulmonary effort is normal.      Breath sounds: Normal breath sounds. No stridor. No wheezing, rhonchi or rales. Musculoskeletal:      Cervical back: Normal range of motion and neck supple. Right lower leg: No edema. Left lower leg: No edema. Lymphadenopathy:      Cervical: No cervical adenopathy. Skin:     General: Skin is warm and dry. Findings: No rash. Neurological:      Mental Status: She is alert and oriented to person, place, and time.        Data:     Lab Results   Component Value Date/Time     03/24/2022 09:36 AM    K 4.6 03/24/2022 09:36 AM     03/24/2022 09:36 AM    CO2 24 03/24/2022 09:36 AM    BUN 16 03/24/2022 09:36 AM    CREATININE 0.97 03/24/2022 09:36 AM    GLUCOSE 142 03/24/2022 09:36 AM    PROT 6.6 10/16/2019 05:43 AM    LABALBU 3.6 10/16/2019 05:43 AM    BILITOT 0.35 10/16/2019 05:43 AM    ALKPHOS 75 10/16/2019 05:43 AM    AST 40 03/24/2022 09:36 AM    ALT 53 03/24/2022 09:36 AM     Lab Results   Component Value Date/Time    WBC 7.0 03/24/2022 09:36 AM    RBC 5.00 03/24/2022 09:36 AM    HGB 14.1 03/24/2022 09:36 AM    HCT 45.1 03/24/2022 09:36 AM    MCV 90.2 03/24/2022 09:36 AM    MCH 28.2 03/24/2022 09:36 AM    MCHC 31.3 03/24/2022 09:36 AM    RDW 13.7 03/24/2022 09:36 AM     03/24/2022 09:36 AM    MPV 9.6 03/24/2022 09:36 AM     Lab Results   Component Value

## 2023-09-07 ENCOUNTER — OFFICE VISIT (OUTPATIENT)
Dept: PRIMARY CARE CLINIC | Age: 63
End: 2023-09-07
Payer: MEDICARE

## 2023-09-07 VITALS
OXYGEN SATURATION: 97 % | HEART RATE: 100 BPM | BODY MASS INDEX: 40.31 KG/M2 | DIASTOLIC BLOOD PRESSURE: 74 MMHG | TEMPERATURE: 98.6 F | SYSTOLIC BLOOD PRESSURE: 126 MMHG | WEIGHT: 220.4 LBS

## 2023-09-07 DIAGNOSIS — R13.10 DYSPHAGIA, UNSPECIFIED TYPE: ICD-10-CM

## 2023-09-07 DIAGNOSIS — E66.01 OBESITY, CLASS III, BMI 40-49.9 (MORBID OBESITY) (HCC): ICD-10-CM

## 2023-09-07 DIAGNOSIS — I10 ESSENTIAL HYPERTENSION: ICD-10-CM

## 2023-09-07 DIAGNOSIS — F33.1 MODERATE EPISODE OF RECURRENT MAJOR DEPRESSIVE DISORDER (HCC): ICD-10-CM

## 2023-09-07 DIAGNOSIS — T63.301A SPIDER BITE WOUND, ACCIDENTAL OR UNINTENTIONAL, INITIAL ENCOUNTER: Primary | ICD-10-CM

## 2023-09-07 PROCEDURE — G8417 CALC BMI ABV UP PARAM F/U: HCPCS | Performed by: NURSE PRACTITIONER

## 2023-09-07 PROCEDURE — 3074F SYST BP LT 130 MM HG: CPT | Performed by: NURSE PRACTITIONER

## 2023-09-07 PROCEDURE — 1036F TOBACCO NON-USER: CPT | Performed by: NURSE PRACTITIONER

## 2023-09-07 PROCEDURE — G8427 DOCREV CUR MEDS BY ELIG CLIN: HCPCS | Performed by: NURSE PRACTITIONER

## 2023-09-07 PROCEDURE — 99214 OFFICE O/P EST MOD 30 MIN: CPT | Performed by: NURSE PRACTITIONER

## 2023-09-07 PROCEDURE — 3078F DIAST BP <80 MM HG: CPT | Performed by: NURSE PRACTITIONER

## 2023-09-07 PROCEDURE — 3017F COLORECTAL CA SCREEN DOC REV: CPT | Performed by: NURSE PRACTITIONER

## 2023-09-07 RX ORDER — OMEPRAZOLE 20 MG/1
CAPSULE, DELAYED RELEASE ORAL
Qty: 90 CAPSULE | Refills: 3 | Status: SHIPPED | OUTPATIENT
Start: 2023-09-07

## 2023-09-07 RX ORDER — CEPHALEXIN 500 MG/1
500 CAPSULE ORAL 2 TIMES DAILY
Qty: 14 CAPSULE | Refills: 0 | Status: SHIPPED | OUTPATIENT
Start: 2023-09-07 | End: 2023-09-14

## 2023-09-07 RX ORDER — ATENOLOL 25 MG/1
25 TABLET ORAL DAILY
Qty: 90 TABLET | Refills: 3 | Status: SHIPPED | OUTPATIENT
Start: 2023-09-07

## 2023-09-07 ASSESSMENT — PATIENT HEALTH QUESTIONNAIRE - PHQ9
SUM OF ALL RESPONSES TO PHQ QUESTIONS 1-9: 0
5. POOR APPETITE OR OVEREATING: 0
SUM OF ALL RESPONSES TO PHQ9 QUESTIONS 1 & 2: 0
10. IF YOU CHECKED OFF ANY PROBLEMS, HOW DIFFICULT HAVE THESE PROBLEMS MADE IT FOR YOU TO DO YOUR WORK, TAKE CARE OF THINGS AT HOME, OR GET ALONG WITH OTHER PEOPLE: 0
1. LITTLE INTEREST OR PLEASURE IN DOING THINGS: 0
SUM OF ALL RESPONSES TO PHQ QUESTIONS 1-9: 0
9. THOUGHTS THAT YOU WOULD BE BETTER OFF DEAD, OR OF HURTING YOURSELF: 0
SUM OF ALL RESPONSES TO PHQ QUESTIONS 1-9: 0
3. TROUBLE FALLING OR STAYING ASLEEP: 0
7. TROUBLE CONCENTRATING ON THINGS, SUCH AS READING THE NEWSPAPER OR WATCHING TELEVISION: 0
SUM OF ALL RESPONSES TO PHQ QUESTIONS 1-9: 0
2. FEELING DOWN, DEPRESSED OR HOPELESS: 0
6. FEELING BAD ABOUT YOURSELF - OR THAT YOU ARE A FAILURE OR HAVE LET YOURSELF OR YOUR FAMILY DOWN: 0
8. MOVING OR SPEAKING SO SLOWLY THAT OTHER PEOPLE COULD HAVE NOTICED. OR THE OPPOSITE, BEING SO FIGETY OR RESTLESS THAT YOU HAVE BEEN MOVING AROUND A LOT MORE THAN USUAL: 0
4. FEELING TIRED OR HAVING LITTLE ENERGY: 0

## 2023-09-07 ASSESSMENT — ENCOUNTER SYMPTOMS
GASTROINTESTINAL NEGATIVE: 1
RESPIRATORY NEGATIVE: 1
ALLERGIC/IMMUNOLOGIC NEGATIVE: 1
EYES NEGATIVE: 1

## 2023-09-07 NOTE — PROGRESS NOTES
0     Sig: Apply topically 3 times daily for 10 days Apply topically 2-3 times daily. No follow-ups on file.

## 2023-09-07 NOTE — PATIENT INSTRUCTIONS
SURVEY:     You may be receiving a survey from [employer] regarding your visit today. Please complete the survey to enable us to provide the highest quality of care to you and your family. If you cannot score us a very good on any question, please call the office to discuss how we could have made your experience a very good one.      Thank you,    Rex Salgado, APRN-CNP  Elian Vargas, APRN-CNP  Alyson Burt, RADHA Padilla, CMA  Oral Ripa, CMA  Antonia, CMA  Sofia, PCA  Vivian, PM

## 2023-09-19 ENCOUNTER — OFFICE VISIT (OUTPATIENT)
Dept: PRIMARY CARE CLINIC | Age: 63
End: 2023-09-19
Payer: MEDICARE

## 2023-09-19 VITALS
WEIGHT: 220 LBS | SYSTOLIC BLOOD PRESSURE: 124 MMHG | OXYGEN SATURATION: 95 % | TEMPERATURE: 97.6 F | HEART RATE: 93 BPM | BODY MASS INDEX: 40.24 KG/M2 | RESPIRATION RATE: 16 BRPM | DIASTOLIC BLOOD PRESSURE: 80 MMHG

## 2023-09-19 DIAGNOSIS — Z20.7 SCABIES EXPOSURE: ICD-10-CM

## 2023-09-19 DIAGNOSIS — B86 SCABIES: Primary | ICD-10-CM

## 2023-09-19 PROCEDURE — 3079F DIAST BP 80-89 MM HG: CPT | Performed by: NURSE PRACTITIONER

## 2023-09-19 PROCEDURE — 1036F TOBACCO NON-USER: CPT | Performed by: NURSE PRACTITIONER

## 2023-09-19 PROCEDURE — 3017F COLORECTAL CA SCREEN DOC REV: CPT | Performed by: NURSE PRACTITIONER

## 2023-09-19 PROCEDURE — 3074F SYST BP LT 130 MM HG: CPT | Performed by: NURSE PRACTITIONER

## 2023-09-19 PROCEDURE — G8427 DOCREV CUR MEDS BY ELIG CLIN: HCPCS | Performed by: NURSE PRACTITIONER

## 2023-09-19 PROCEDURE — 99213 OFFICE O/P EST LOW 20 MIN: CPT | Performed by: NURSE PRACTITIONER

## 2023-09-19 PROCEDURE — G8417 CALC BMI ABV UP PARAM F/U: HCPCS | Performed by: NURSE PRACTITIONER

## 2023-09-19 RX ORDER — PERMETHRIN 50 MG/G
CREAM TOPICAL
Qty: 60 G | Refills: 1 | Status: SHIPPED | OUTPATIENT
Start: 2023-09-19

## 2023-09-19 ASSESSMENT — PATIENT HEALTH QUESTIONNAIRE - PHQ9
2. FEELING DOWN, DEPRESSED OR HOPELESS: 0
1. LITTLE INTEREST OR PLEASURE IN DOING THINGS: 0
10. IF YOU CHECKED OFF ANY PROBLEMS, HOW DIFFICULT HAVE THESE PROBLEMS MADE IT FOR YOU TO DO YOUR WORK, TAKE CARE OF THINGS AT HOME, OR GET ALONG WITH OTHER PEOPLE: 0
9. THOUGHTS THAT YOU WOULD BE BETTER OFF DEAD, OR OF HURTING YOURSELF: 0
SUM OF ALL RESPONSES TO PHQ9 QUESTIONS 1 & 2: 0
3. TROUBLE FALLING OR STAYING ASLEEP: 0
8. MOVING OR SPEAKING SO SLOWLY THAT OTHER PEOPLE COULD HAVE NOTICED. OR THE OPPOSITE, BEING SO FIGETY OR RESTLESS THAT YOU HAVE BEEN MOVING AROUND A LOT MORE THAN USUAL: 0
SUM OF ALL RESPONSES TO PHQ QUESTIONS 1-9: 0
6. FEELING BAD ABOUT YOURSELF - OR THAT YOU ARE A FAILURE OR HAVE LET YOURSELF OR YOUR FAMILY DOWN: 0
5. POOR APPETITE OR OVEREATING: 0
4. FEELING TIRED OR HAVING LITTLE ENERGY: 0
SUM OF ALL RESPONSES TO PHQ QUESTIONS 1-9: 0
SUM OF ALL RESPONSES TO PHQ QUESTIONS 1-9: 0
7. TROUBLE CONCENTRATING ON THINGS, SUCH AS READING THE NEWSPAPER OR WATCHING TELEVISION: 0
SUM OF ALL RESPONSES TO PHQ QUESTIONS 1-9: 0

## 2023-09-19 ASSESSMENT — ENCOUNTER SYMPTOMS
EYES NEGATIVE: 1
ALLERGIC/IMMUNOLOGIC NEGATIVE: 1
RESPIRATORY NEGATIVE: 1
GASTROINTESTINAL NEGATIVE: 1

## 2023-09-19 NOTE — PATIENT INSTRUCTIONS
SURVEY:     You may be receiving a survey from [employer] regarding your visit today. Please complete the survey to enable us to provide the highest quality of care to you and your family. If you cannot score us a very good on any question, please call the office to discuss how we could have made your experience a very good one.      Thank you,    Linette Salgado, APRN-JONATHAN Albarado, APRN-JONATHAN Maynard, RADHA Lerma, CMA  Cleo Lebron, CMA  Antonia, CMA  Sofia, PCA  Vivian, PM

## 2023-10-05 ENCOUNTER — TELEPHONE (OUTPATIENT)
Dept: PRIMARY CARE CLINIC | Age: 63
End: 2023-10-05

## 2023-10-05 DIAGNOSIS — L29.9 ITCHING: ICD-10-CM

## 2023-10-05 DIAGNOSIS — L30.9 DERMATITIS: Primary | ICD-10-CM

## 2023-10-05 DIAGNOSIS — B86 SCABIES: ICD-10-CM

## 2023-10-05 RX ORDER — HYDROXYZINE HYDROCHLORIDE 25 MG/1
25 TABLET, FILM COATED ORAL NIGHTLY PRN
Qty: 14 TABLET | Refills: 0 | Status: SHIPPED | OUTPATIENT
Start: 2023-10-05 | End: 2023-10-19

## 2023-10-05 RX ORDER — METHYLPREDNISOLONE 4 MG/1
TABLET ORAL
Qty: 1 KIT | Refills: 0 | Status: SHIPPED | OUTPATIENT
Start: 2023-10-05 | End: 2023-10-11

## 2023-10-05 NOTE — TELEPHONE ENCOUNTER
Patient contacted the office in regards to her rash worsening and becoming more itchy. Patient said it is still everywhere but her wrist and forearm areas are worse. Patient notices the itching is worse at night. Patient wondering if she could be prescribed something for the itching at night because she is itching so bad and takes Benadryl to fall asleep. Patient also would like referral to Dermatology. Please advise.

## 2023-10-19 DIAGNOSIS — L30.9 DERMATITIS: ICD-10-CM

## 2023-10-19 RX ORDER — METHYLPREDNISOLONE 4 MG/1
TABLET ORAL
Qty: 1 KIT | Refills: 0 | Status: SHIPPED | OUTPATIENT
Start: 2023-10-19 | End: 2023-10-25

## 2023-10-19 RX ORDER — METHYLPREDNISOLONE 4 MG/1
TABLET ORAL
Qty: 1 KIT | Refills: 0 | OUTPATIENT
Start: 2023-10-19 | End: 2023-10-25

## 2023-10-19 NOTE — TELEPHONE ENCOUNTER
Patient is contacting the office wanting to get the medrol dose pack. Patient states that she has her sons wedding on 10/21. Patient states that Itching and rash are getting worse and spreading. Patient went to dermatology appointment Monday, Oct. 16th and they gave her anti itch pills and cream, patient states those are also not helping. Patient has another appointment with Derm on Nov. 20th. Patient states that the medrol dose pack is the only medication that has helped with the itching so far. Please advise.

## 2023-10-19 NOTE — TELEPHONE ENCOUNTER
Health Maintenance   Topic Date Due    Shingles vaccine (1 of 2) Never done    Low dose CT lung screening &/or counseling  07/12/2022    Annual Wellness Visit (AWV)  Never done    Flu vaccine (1) 08/01/2023    DTaP/Tdap/Td vaccine (1 - Tdap) 02/23/2024 (Originally 7/12/1978)    COVID-19 Vaccine (3 - Moderna series) 02/23/2024 (Originally 6/1/2021)    Hepatitis C screen  09/07/2024 (Originally 7/12/1977)    HIV screen  09/07/2024 (Originally 7/12/1974)    Pneumococcal 0-64 years Vaccine (2 - PCV) 09/19/2024 (Originally 6/4/2020)    Breast cancer screen  12/20/2023    Depression Monitoring  09/19/2024    Diabetes screen  03/29/2025    Lipids  03/24/2027    Colorectal Cancer Screen  09/25/2028    Hepatitis A vaccine  Aged Out    Hepatitis B vaccine  Aged Out    Hib vaccine  Aged Out    Meningococcal (ACWY) vaccine  Aged Out    Cervical cancer screen  Discontinued             (applicable per patient's age: Cancer Screenings, Depression Screening, Fall Risk Screening, Immunizations)    Hemoglobin A1C (%)   Date Value   03/29/2022 5.6   12/10/2020 5.3   05/30/2019 5.3     LDL Cholesterol (mg/dL)   Date Value   03/24/2022 121     AST (U/L)   Date Value   03/24/2022 40 (H)     ALT (U/L)   Date Value   03/24/2022 53 (H)     BUN (mg/dL)   Date Value   03/24/2022 16      (goal A1C is < 7)   (goal LDL is <100) need 30-50% reduction from baseline     BP Readings from Last 3 Encounters:   09/19/23 124/80   09/07/23 126/74   03/24/23 136/88    (goal /80)      All Future Testing planned in [facility]:  Lab Frequency Next Occurrence       Next Visit Date:  No future appointments.          Patient Active Problem List:     Moderate episode of recurrent major depressive disorder (HCC)     Gastroesophageal reflux disease     Neurological symptoms     Acute diverticulitis     Anxiety     Depression     Hypertension     Hyperlipidemia     Morbidly obese (HCC)     Former smoker     Chronic obstructive pulmonary disease ([address])

## 2023-11-07 ENCOUNTER — OFFICE VISIT (OUTPATIENT)
Dept: PRIMARY CARE CLINIC | Age: 63
End: 2023-11-07
Payer: MEDICARE

## 2023-11-07 VITALS
DIASTOLIC BLOOD PRESSURE: 78 MMHG | OXYGEN SATURATION: 97 % | TEMPERATURE: 98.2 F | RESPIRATION RATE: 22 BRPM | WEIGHT: 223.4 LBS | SYSTOLIC BLOOD PRESSURE: 124 MMHG | HEART RATE: 98 BPM | BODY MASS INDEX: 40.86 KG/M2

## 2023-11-07 DIAGNOSIS — M26.69 TMJ INFLAMMATION: Primary | ICD-10-CM

## 2023-11-07 DIAGNOSIS — Z23 NEED FOR INFLUENZA VACCINATION: ICD-10-CM

## 2023-11-07 PROCEDURE — 3074F SYST BP LT 130 MM HG: CPT | Performed by: NURSE PRACTITIONER

## 2023-11-07 PROCEDURE — G0008 ADMIN INFLUENZA VIRUS VAC: HCPCS | Performed by: NURSE PRACTITIONER

## 2023-11-07 PROCEDURE — 3078F DIAST BP <80 MM HG: CPT | Performed by: NURSE PRACTITIONER

## 2023-11-07 PROCEDURE — G8427 DOCREV CUR MEDS BY ELIG CLIN: HCPCS | Performed by: NURSE PRACTITIONER

## 2023-11-07 PROCEDURE — G8482 FLU IMMUNIZE ORDER/ADMIN: HCPCS | Performed by: NURSE PRACTITIONER

## 2023-11-07 PROCEDURE — G8417 CALC BMI ABV UP PARAM F/U: HCPCS | Performed by: NURSE PRACTITIONER

## 2023-11-07 PROCEDURE — 1036F TOBACCO NON-USER: CPT | Performed by: NURSE PRACTITIONER

## 2023-11-07 PROCEDURE — 3017F COLORECTAL CA SCREEN DOC REV: CPT | Performed by: NURSE PRACTITIONER

## 2023-11-07 PROCEDURE — 99213 OFFICE O/P EST LOW 20 MIN: CPT | Performed by: NURSE PRACTITIONER

## 2023-11-07 PROCEDURE — 90674 CCIIV4 VAC NO PRSV 0.5 ML IM: CPT | Performed by: NURSE PRACTITIONER

## 2023-11-07 RX ORDER — METHYLPREDNISOLONE 4 MG/1
TABLET ORAL
Qty: 1 KIT | Refills: 0 | Status: SHIPPED | OUTPATIENT
Start: 2023-11-07 | End: 2023-11-13

## 2023-11-07 RX ORDER — CYCLOBENZAPRINE HCL 10 MG
10 TABLET ORAL 3 TIMES DAILY PRN
Qty: 21 TABLET | Refills: 0 | Status: SHIPPED | OUTPATIENT
Start: 2023-11-07 | End: 2023-11-17

## 2023-11-07 ASSESSMENT — PATIENT HEALTH QUESTIONNAIRE - PHQ9
10. IF YOU CHECKED OFF ANY PROBLEMS, HOW DIFFICULT HAVE THESE PROBLEMS MADE IT FOR YOU TO DO YOUR WORK, TAKE CARE OF THINGS AT HOME, OR GET ALONG WITH OTHER PEOPLE: 0
SUM OF ALL RESPONSES TO PHQ QUESTIONS 1-9: 0
6. FEELING BAD ABOUT YOURSELF - OR THAT YOU ARE A FAILURE OR HAVE LET YOURSELF OR YOUR FAMILY DOWN: 0
SUM OF ALL RESPONSES TO PHQ QUESTIONS 1-9: 0
SUM OF ALL RESPONSES TO PHQ QUESTIONS 1-9: 0
7. TROUBLE CONCENTRATING ON THINGS, SUCH AS READING THE NEWSPAPER OR WATCHING TELEVISION: 0
4. FEELING TIRED OR HAVING LITTLE ENERGY: 0
1. LITTLE INTEREST OR PLEASURE IN DOING THINGS: 0
3. TROUBLE FALLING OR STAYING ASLEEP: 0
2. FEELING DOWN, DEPRESSED OR HOPELESS: 0
8. MOVING OR SPEAKING SO SLOWLY THAT OTHER PEOPLE COULD HAVE NOTICED. OR THE OPPOSITE, BEING SO FIGETY OR RESTLESS THAT YOU HAVE BEEN MOVING AROUND A LOT MORE THAN USUAL: 0
SUM OF ALL RESPONSES TO PHQ QUESTIONS 1-9: 0
9. THOUGHTS THAT YOU WOULD BE BETTER OFF DEAD, OR OF HURTING YOURSELF: 0
5. POOR APPETITE OR OVEREATING: 0
SUM OF ALL RESPONSES TO PHQ9 QUESTIONS 1 & 2: 0

## 2023-11-07 ASSESSMENT — ENCOUNTER SYMPTOMS
VISUAL CHANGE: 0
TROUBLE SWALLOWING: 0
CHANGE IN BOWEL HABIT: 0
VOMITING: 0
SORE THROAT: 0
NAUSEA: 0
VOICE CHANGE: 0
COUGH: 0
SWOLLEN GLANDS: 0
FACIAL SWELLING: 1
ABDOMINAL PAIN: 0

## 2023-11-07 ASSESSMENT — COLUMBIA-SUICIDE SEVERITY RATING SCALE - C-SSRS
3. HAVE YOU BEEN THINKING ABOUT HOW YOU MIGHT KILL YOURSELF?: NO
4. HAVE YOU HAD THESE THOUGHTS AND HAD SOME INTENTION OF ACTING ON THEM?: NO
7. DID THIS OCCUR IN THE LAST THREE MONTHS: NO
5. HAVE YOU STARTED TO WORK OUT OR WORKED OUT THE DETAILS OF HOW TO KILL YOURSELF? DO YOU INTEND TO CARRY OUT THIS PLAN?: NO

## 2023-11-07 NOTE — PATIENT INSTRUCTIONS
SURVEY:     You may be receiving a survey from [employer] regarding your visit today. Please complete the survey to enable us to provide the highest quality of care to you and your family. If you cannot score us a very good on any question, please call the office to discuss how we could have made your experience a very good one.      Thank you,    Yonas Salgado, APRN-CNP  Alma Shirley, APRN-CNP  Fanny Holman, RADHA Davey, MARIBEL Wood, CMA  Antonia, CMA  Sofia, PCA  Vivian, PM

## 2023-12-13 ENCOUNTER — OFFICE VISIT (OUTPATIENT)
Dept: PRIMARY CARE CLINIC | Age: 63
End: 2023-12-13
Payer: MEDICARE

## 2023-12-13 VITALS
HEART RATE: 104 BPM | DIASTOLIC BLOOD PRESSURE: 102 MMHG | SYSTOLIC BLOOD PRESSURE: 150 MMHG | RESPIRATION RATE: 20 BRPM | WEIGHT: 225.9 LBS | BODY MASS INDEX: 41.32 KG/M2 | TEMPERATURE: 98.5 F

## 2023-12-13 DIAGNOSIS — I10 PRIMARY HYPERTENSION: ICD-10-CM

## 2023-12-13 DIAGNOSIS — Z13.31 POSITIVE DEPRESSION SCREENING: ICD-10-CM

## 2023-12-13 DIAGNOSIS — F34.1 DYSTHYMIA: Primary | ICD-10-CM

## 2023-12-13 PROCEDURE — 3077F SYST BP >= 140 MM HG: CPT | Performed by: NURSE PRACTITIONER

## 2023-12-13 PROCEDURE — 1036F TOBACCO NON-USER: CPT | Performed by: NURSE PRACTITIONER

## 2023-12-13 PROCEDURE — G8482 FLU IMMUNIZE ORDER/ADMIN: HCPCS | Performed by: NURSE PRACTITIONER

## 2023-12-13 PROCEDURE — 3080F DIAST BP >= 90 MM HG: CPT | Performed by: NURSE PRACTITIONER

## 2023-12-13 PROCEDURE — G8417 CALC BMI ABV UP PARAM F/U: HCPCS | Performed by: NURSE PRACTITIONER

## 2023-12-13 PROCEDURE — 99214 OFFICE O/P EST MOD 30 MIN: CPT | Performed by: NURSE PRACTITIONER

## 2023-12-13 PROCEDURE — 3017F COLORECTAL CA SCREEN DOC REV: CPT | Performed by: NURSE PRACTITIONER

## 2023-12-13 PROCEDURE — G8427 DOCREV CUR MEDS BY ELIG CLIN: HCPCS | Performed by: NURSE PRACTITIONER

## 2023-12-13 RX ORDER — DULOXETIN HYDROCHLORIDE 30 MG/1
30 CAPSULE, DELAYED RELEASE ORAL DAILY
Qty: 90 CAPSULE | Refills: 0 | Status: SHIPPED | OUTPATIENT
Start: 2023-12-13

## 2023-12-13 SDOH — ECONOMIC STABILITY: FOOD INSECURITY: WITHIN THE PAST 12 MONTHS, YOU WORRIED THAT YOUR FOOD WOULD RUN OUT BEFORE YOU GOT MONEY TO BUY MORE.: PATIENT DECLINED

## 2023-12-13 SDOH — ECONOMIC STABILITY: INCOME INSECURITY: HOW HARD IS IT FOR YOU TO PAY FOR THE VERY BASICS LIKE FOOD, HOUSING, MEDICAL CARE, AND HEATING?: PATIENT DECLINED

## 2023-12-13 SDOH — ECONOMIC STABILITY: FOOD INSECURITY: WITHIN THE PAST 12 MONTHS, THE FOOD YOU BOUGHT JUST DIDN'T LAST AND YOU DIDN'T HAVE MONEY TO GET MORE.: PATIENT DECLINED

## 2023-12-13 SDOH — ECONOMIC STABILITY: HOUSING INSECURITY
IN THE LAST 12 MONTHS, WAS THERE A TIME WHEN YOU DID NOT HAVE A STEADY PLACE TO SLEEP OR SLEPT IN A SHELTER (INCLUDING NOW)?: PATIENT REFUSED

## 2023-12-13 ASSESSMENT — PATIENT HEALTH QUESTIONNAIRE - PHQ9
4. FEELING TIRED OR HAVING LITTLE ENERGY: 2
5. POOR APPETITE OR OVEREATING: 0
SUM OF ALL RESPONSES TO PHQ9 QUESTIONS 1 & 2: 5
SUM OF ALL RESPONSES TO PHQ QUESTIONS 1-9: 14
9. THOUGHTS THAT YOU WOULD BE BETTER OFF DEAD, OR OF HURTING YOURSELF: 0
1. LITTLE INTEREST OR PLEASURE IN DOING THINGS: 2
2. FEELING DOWN, DEPRESSED OR HOPELESS: 3
8. MOVING OR SPEAKING SO SLOWLY THAT OTHER PEOPLE COULD HAVE NOTICED. OR THE OPPOSITE, BEING SO FIGETY OR RESTLESS THAT YOU HAVE BEEN MOVING AROUND A LOT MORE THAN USUAL: 2
6. FEELING BAD ABOUT YOURSELF - OR THAT YOU ARE A FAILURE OR HAVE LET YOURSELF OR YOUR FAMILY DOWN: 1
SUM OF ALL RESPONSES TO PHQ QUESTIONS 1-9: 14
7. TROUBLE CONCENTRATING ON THINGS, SUCH AS READING THE NEWSPAPER OR WATCHING TELEVISION: 1
3. TROUBLE FALLING OR STAYING ASLEEP: 3
10. IF YOU CHECKED OFF ANY PROBLEMS, HOW DIFFICULT HAVE THESE PROBLEMS MADE IT FOR YOU TO DO YOUR WORK, TAKE CARE OF THINGS AT HOME, OR GET ALONG WITH OTHER PEOPLE: 0
SUM OF ALL RESPONSES TO PHQ QUESTIONS 1-9: 14
SUM OF ALL RESPONSES TO PHQ QUESTIONS 1-9: 14

## 2023-12-13 ASSESSMENT — ENCOUNTER SYMPTOMS
DIARRHEA: 0
VOMITING: 0
WHEEZING: 0
VISUAL CHANGE: 0
CONSTIPATION: 0
NAUSEA: 0
ABDOMINAL PAIN: 0
SORE THROAT: 0
ORTHOPNEA: 0
COUGH: 0
BLURRED VISION: 0
RHINORRHEA: 0
SHORTNESS OF BREATH: 0

## 2023-12-13 NOTE — PROGRESS NOTES
Name: Marii Valdovinos  : 1960         Chief Complaint:     Chief Complaint   Patient presents with    Mental Health Problem     Would like to discuss medication. Cries often. Hypertension       History of Present Illness:      Marii Valdovinos is a 58 y.o.  female who presents with Mental Health Problem (Would like to discuss medication. Cries often.) and Hypertension      Dee Hale is here today for a routine office visit. She states she is having a lot of anxiety and irritability which is affecting her mood. She states she did stop taking bupropion and sertraline about 6 months or so ago due to feeling good. She also stopped her blood pressure medication at that time. Her blood pressure remains elevated and her mood remains anxious. See below for further comments. Hypertension  This is a chronic problem. The current episode started more than 1 year ago. The problem has been gradually worsening since onset. The problem is uncontrolled. Associated symptoms include anxiety and headaches. Pertinent negatives include no blurred vision, chest pain, malaise/fatigue, neck pain, orthopnea, palpitations, peripheral edema, PND, shortness of breath or sweats. There are no associated agents to hypertension. Risk factors for coronary artery disease include dyslipidemia, family history, obesity, post-menopausal state and stress. Past treatments include calcium channel blockers and beta blockers. The current treatment provides moderate improvement. Compliance problems include exercise and diet. There is no history of kidney disease, CAD/MI, CVA or heart failure. There is no history of chronic renal disease. Mental Health Problem  The primary symptoms include dysphoric mood and negative symptoms. The primary symptoms do not include delusions, hallucinations, bizarre behavior, disorganized speech or somatic symptoms. The current episode started more than 1 month ago. This is a chronic problem.    The dysphoric

## 2023-12-13 NOTE — PATIENT INSTRUCTIONS
SURVEY:     You may be receiving a survey from [employer] regarding your visit today. Please complete the survey to enable us to provide the highest quality of care to you and your family. If you cannot score us a very good on any question, please call the office to discuss how we could have made your experience a very good one.      Thank you,    Yoshi Salgado, APRN-CNP  Sixto Payton, APRN-CNP  Alea Philip, LPN  Anton Hairston, CMA  Marcos Cuadra, CMA  Antonia, CMA  Sofia, PCA  Vivian, PM

## 2023-12-27 ENCOUNTER — NURSE ONLY (OUTPATIENT)
Dept: PRIMARY CARE CLINIC | Age: 63
End: 2023-12-27

## 2023-12-27 VITALS — BODY MASS INDEX: 41.21 KG/M2 | DIASTOLIC BLOOD PRESSURE: 92 MMHG | SYSTOLIC BLOOD PRESSURE: 156 MMHG | WEIGHT: 225.3 LBS

## 2023-12-27 DIAGNOSIS — I10 PRIMARY HYPERTENSION: Primary | ICD-10-CM

## 2023-12-27 RX ORDER — LOSARTAN POTASSIUM AND HYDROCHLOROTHIAZIDE 12.5; 5 MG/1; MG/1
1 TABLET ORAL DAILY
Qty: 90 TABLET | Refills: 0 | Status: SHIPPED | OUTPATIENT
Start: 2023-12-27

## 2023-12-27 NOTE — PROGRESS NOTES
Continue diltiazem and start losartan/hydrochlorothiazide 50/12. 5. Recheck blood pressure in 2 weeks. Thank you.

## 2024-01-03 ENCOUNTER — OFFICE VISIT (OUTPATIENT)
Dept: PRIMARY CARE CLINIC | Age: 64
End: 2024-01-03
Payer: MEDICARE

## 2024-01-03 VITALS
HEART RATE: 122 BPM | RESPIRATION RATE: 20 BRPM | BODY MASS INDEX: 41.04 KG/M2 | WEIGHT: 224.4 LBS | TEMPERATURE: 98.7 F | DIASTOLIC BLOOD PRESSURE: 88 MMHG | SYSTOLIC BLOOD PRESSURE: 152 MMHG | OXYGEN SATURATION: 97 %

## 2024-01-03 DIAGNOSIS — J44.0 ACUTE BRONCHITIS WITH COPD (HCC): Primary | ICD-10-CM

## 2024-01-03 DIAGNOSIS — J20.9 ACUTE BRONCHITIS WITH COPD (HCC): Primary | ICD-10-CM

## 2024-01-03 DIAGNOSIS — Z12.31 VISIT FOR SCREENING MAMMOGRAM: ICD-10-CM

## 2024-01-03 PROBLEM — F33.1 MODERATE EPISODE OF RECURRENT MAJOR DEPRESSIVE DISORDER (HCC): Status: RESOLVED | Noted: 2018-09-05 | Resolved: 2024-01-03

## 2024-01-03 PROCEDURE — G8482 FLU IMMUNIZE ORDER/ADMIN: HCPCS | Performed by: NURSE PRACTITIONER

## 2024-01-03 PROCEDURE — 3017F COLORECTAL CA SCREEN DOC REV: CPT | Performed by: NURSE PRACTITIONER

## 2024-01-03 PROCEDURE — 99213 OFFICE O/P EST LOW 20 MIN: CPT | Performed by: NURSE PRACTITIONER

## 2024-01-03 PROCEDURE — 1036F TOBACCO NON-USER: CPT | Performed by: NURSE PRACTITIONER

## 2024-01-03 PROCEDURE — 3023F SPIROM DOC REV: CPT | Performed by: NURSE PRACTITIONER

## 2024-01-03 PROCEDURE — G8427 DOCREV CUR MEDS BY ELIG CLIN: HCPCS | Performed by: NURSE PRACTITIONER

## 2024-01-03 PROCEDURE — G8417 CALC BMI ABV UP PARAM F/U: HCPCS | Performed by: NURSE PRACTITIONER

## 2024-01-03 PROCEDURE — 3079F DIAST BP 80-89 MM HG: CPT | Performed by: NURSE PRACTITIONER

## 2024-01-03 PROCEDURE — 3077F SYST BP >= 140 MM HG: CPT | Performed by: NURSE PRACTITIONER

## 2024-01-03 RX ORDER — BENZONATATE 200 MG/1
200 CAPSULE ORAL 3 TIMES DAILY PRN
Qty: 30 CAPSULE | Refills: 0 | Status: SHIPPED | OUTPATIENT
Start: 2024-01-03 | End: 2024-01-13

## 2024-01-03 RX ORDER — ONDANSETRON 4 MG/1
4 TABLET, ORALLY DISINTEGRATING ORAL 3 TIMES DAILY PRN
Qty: 21 TABLET | Refills: 0 | Status: SHIPPED | OUTPATIENT
Start: 2024-01-03

## 2024-01-03 RX ORDER — DOXYCYCLINE HYCLATE 100 MG
100 TABLET ORAL 2 TIMES DAILY
Qty: 14 TABLET | Refills: 0 | Status: SHIPPED | OUTPATIENT
Start: 2024-01-03 | End: 2024-01-10

## 2024-01-03 SDOH — ECONOMIC STABILITY: HOUSING INSECURITY
IN THE LAST 12 MONTHS, WAS THERE A TIME WHEN YOU DID NOT HAVE A STEADY PLACE TO SLEEP OR SLEPT IN A SHELTER (INCLUDING NOW)?: PATIENT DECLINED

## 2024-01-03 SDOH — ECONOMIC STABILITY: INCOME INSECURITY: HOW HARD IS IT FOR YOU TO PAY FOR THE VERY BASICS LIKE FOOD, HOUSING, MEDICAL CARE, AND HEATING?: PATIENT DECLINED

## 2024-01-03 SDOH — ECONOMIC STABILITY: FOOD INSECURITY: WITHIN THE PAST 12 MONTHS, THE FOOD YOU BOUGHT JUST DIDN'T LAST AND YOU DIDN'T HAVE MONEY TO GET MORE.: PATIENT DECLINED

## 2024-01-03 SDOH — ECONOMIC STABILITY: FOOD INSECURITY: WITHIN THE PAST 12 MONTHS, YOU WORRIED THAT YOUR FOOD WOULD RUN OUT BEFORE YOU GOT MONEY TO BUY MORE.: PATIENT DECLINED

## 2024-01-03 ASSESSMENT — PATIENT HEALTH QUESTIONNAIRE - PHQ9
SUM OF ALL RESPONSES TO PHQ QUESTIONS 1-9: 0
4. FEELING TIRED OR HAVING LITTLE ENERGY: 0
9. THOUGHTS THAT YOU WOULD BE BETTER OFF DEAD, OR OF HURTING YOURSELF: 0
5. POOR APPETITE OR OVEREATING: 0
10. IF YOU CHECKED OFF ANY PROBLEMS, HOW DIFFICULT HAVE THESE PROBLEMS MADE IT FOR YOU TO DO YOUR WORK, TAKE CARE OF THINGS AT HOME, OR GET ALONG WITH OTHER PEOPLE: 0
SUM OF ALL RESPONSES TO PHQ QUESTIONS 1-9: 0
6. FEELING BAD ABOUT YOURSELF - OR THAT YOU ARE A FAILURE OR HAVE LET YOURSELF OR YOUR FAMILY DOWN: 0
1. LITTLE INTEREST OR PLEASURE IN DOING THINGS: 0
8. MOVING OR SPEAKING SO SLOWLY THAT OTHER PEOPLE COULD HAVE NOTICED. OR THE OPPOSITE, BEING SO FIGETY OR RESTLESS THAT YOU HAVE BEEN MOVING AROUND A LOT MORE THAN USUAL: 0
3. TROUBLE FALLING OR STAYING ASLEEP: 0
2. FEELING DOWN, DEPRESSED OR HOPELESS: 0
SUM OF ALL RESPONSES TO PHQ QUESTIONS 1-9: 0
SUM OF ALL RESPONSES TO PHQ QUESTIONS 1-9: 0
SUM OF ALL RESPONSES TO PHQ9 QUESTIONS 1 & 2: 0
7. TROUBLE CONCENTRATING ON THINGS, SUCH AS READING THE NEWSPAPER OR WATCHING TELEVISION: 0

## 2024-01-03 ASSESSMENT — ENCOUNTER SYMPTOMS
SORE THROAT: 1
HEMOPTYSIS: 0
COUGH: 1
WHEEZING: 1
SHORTNESS OF BREATH: 0
HEARTBURN: 0
RHINORRHEA: 0

## 2024-01-03 NOTE — PATIENT INSTRUCTIONS
SURVEY:     You may be receiving a survey from [facility] regarding your visit today.     Please complete the survey to enable us to provide the highest quality of care to you and your family.     If you cannot score us a very good on any question, please call the office to discuss how we could have made your experience a very good one.     Thank you,    Raj Salgado, APRN-CNP  Diana Ramos, APRN-CNP  Cindy, RADHA Moore, MARIBEL Torre, CMA  Antonia, CMA  Sofia, PCA  Vivian, PM

## 2024-01-03 NOTE — PROGRESS NOTES
Name: Renata Hackett  : 1960         Chief Complaint:     Chief Complaint   Patient presents with    Cough    Chest Congestion     X 4 days.       History of Present Illness:      Renata Hackett is a 63 y.o.  female who presents with Cough and Chest Congestion (X 4 days.)      Renata is here today for a same day office visit.    Cough  This is a new problem. The current episode started in the past 7 days. The problem has been waxing and waning. Episode frequency: FREQUENTLY. The cough is Non-productive. Associated symptoms include ear congestion, headaches, nasal congestion, postnasal drip, a sore throat and wheezing. Pertinent negatives include no chest pain, chills, ear pain, fever, heartburn, hemoptysis, myalgias, rash, rhinorrhea, shortness of breath, sweats or weight loss. The symptoms are aggravated by lying down and fumes. Risk factors for lung disease include smoking/tobacco exposure. She has tried a beta-agonist inhaler, rest and OTC cough suppressant for the symptoms. The treatment provided mild relief. Her past medical history is significant for asthma, bronchitis, COPD, environmental allergies and pneumonia. There is no history of bronchiectasis or emphysema.         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     CHOLECYSTECTOMY      COLONOSCOPY      ENDOSCOPY, COLON, DIAGNOSTIC      HERNIA REPAIR      HYSTERECTOMY

## 2024-01-10 ENCOUNTER — NURSE ONLY (OUTPATIENT)
Dept: PRIMARY CARE CLINIC | Age: 64
End: 2024-01-10

## 2024-01-10 VITALS
DIASTOLIC BLOOD PRESSURE: 82 MMHG | SYSTOLIC BLOOD PRESSURE: 130 MMHG | BODY MASS INDEX: 41.57 KG/M2 | TEMPERATURE: 98.5 F | OXYGEN SATURATION: 97 % | WEIGHT: 227.3 LBS | RESPIRATION RATE: 20 BRPM | HEART RATE: 96 BPM

## 2024-01-10 DIAGNOSIS — R09.81 CONGESTION OF NASAL SINUS: Primary | ICD-10-CM

## 2024-01-10 LAB
INFLUENZA A ANTIGEN, POC: NEGATIVE
INFLUENZA B ANTIGEN, POC: NEGATIVE
LOT NUMBER POC: NORMAL
SARS-COV-2 RNA POC - COV: NORMAL
VALID INTERNAL CONTROL, POC: PRESENT
VENDOR AND KIT NAME POC: NORMAL

## 2024-01-10 SDOH — ECONOMIC STABILITY: FOOD INSECURITY: WITHIN THE PAST 12 MONTHS, THE FOOD YOU BOUGHT JUST DIDN'T LAST AND YOU DIDN'T HAVE MONEY TO GET MORE.: PATIENT DECLINED

## 2024-01-10 SDOH — ECONOMIC STABILITY: INCOME INSECURITY: HOW HARD IS IT FOR YOU TO PAY FOR THE VERY BASICS LIKE FOOD, HOUSING, MEDICAL CARE, AND HEATING?: PATIENT DECLINED

## 2024-01-10 SDOH — ECONOMIC STABILITY: FOOD INSECURITY: WITHIN THE PAST 12 MONTHS, YOU WORRIED THAT YOUR FOOD WOULD RUN OUT BEFORE YOU GOT MONEY TO BUY MORE.: PATIENT DECLINED

## 2024-01-10 ASSESSMENT — PATIENT HEALTH QUESTIONNAIRE - PHQ9
9. THOUGHTS THAT YOU WOULD BE BETTER OFF DEAD, OR OF HURTING YOURSELF: 0
4. FEELING TIRED OR HAVING LITTLE ENERGY: 0
10. IF YOU CHECKED OFF ANY PROBLEMS, HOW DIFFICULT HAVE THESE PROBLEMS MADE IT FOR YOU TO DO YOUR WORK, TAKE CARE OF THINGS AT HOME, OR GET ALONG WITH OTHER PEOPLE: 0
1. LITTLE INTEREST OR PLEASURE IN DOING THINGS: 0
8. MOVING OR SPEAKING SO SLOWLY THAT OTHER PEOPLE COULD HAVE NOTICED. OR THE OPPOSITE, BEING SO FIGETY OR RESTLESS THAT YOU HAVE BEEN MOVING AROUND A LOT MORE THAN USUAL: 0
SUM OF ALL RESPONSES TO PHQ9 QUESTIONS 1 & 2: 0
SUM OF ALL RESPONSES TO PHQ QUESTIONS 1-9: 0
SUM OF ALL RESPONSES TO PHQ QUESTIONS 1-9: 0
2. FEELING DOWN, DEPRESSED OR HOPELESS: 0
SUM OF ALL RESPONSES TO PHQ QUESTIONS 1-9: 0
3. TROUBLE FALLING OR STAYING ASLEEP: 0
7. TROUBLE CONCENTRATING ON THINGS, SUCH AS READING THE NEWSPAPER OR WATCHING TELEVISION: 0
5. POOR APPETITE OR OVEREATING: 0
SUM OF ALL RESPONSES TO PHQ QUESTIONS 1-9: 0
6. FEELING BAD ABOUT YOURSELF - OR THAT YOU ARE A FAILURE OR HAVE LET YOURSELF OR YOUR FAMILY DOWN: 0

## 2024-01-10 NOTE — PROGRESS NOTES
Blood pressure and COVID results given to Raj Salgado CNP.  He advised for patient to continue current medications and patient to keep next appointment.

## 2024-01-10 NOTE — PATIENT INSTRUCTIONS
SURVEY:     You may be receiving a survey from [facility] [email or web address]] [employer] regarding your visit today.     Please complete the survey to enable us to provide the highest quality of care to you and your family.     If you cannot score us a very good on any question, please call the office to discuss how we could have made your experience a very good one.     Thank you,    Raj Salgado, APRN-CNP  Diana Ramos, APRN-CNP  Cindy, RADHA Moore, MARIBEL Torre, CMA  Antonia, CMA  Sofia, PCA  Vivian, PM

## 2024-02-13 ENCOUNTER — TELEPHONE (OUTPATIENT)
Dept: PRIMARY CARE CLINIC | Age: 64
End: 2024-02-13

## 2024-02-13 NOTE — TELEPHONE ENCOUNTER
Patient calls office stating that she is having fatigue and feels like she easily tired.  Patient is wanting to know if she can have orders to check for anemia or if she needs an appointment.

## 2024-02-13 NOTE — TELEPHONE ENCOUNTER
I would prefer she has an appointment so we can discuss further and decide what labs or testing to be completed.  Thank you.

## 2024-02-18 NOTE — PROGRESS NOTES
Subjective:      Patient ID: Renata Hackett is a 63 y.o. female.     Patient must see physician at next appointment.  Patient follows with Dr. Carter last seen in July 2021    HPI  Patient here for follow-up for chronic cough, chronic bronchitis, asthmatic bronchitis, TASHIA and smoking history. Last seen in office per Dr. Carter in July 2021    No compliance data available patient states she is not using her machine. Patient is still due for annual CT lung screening. Discussed with patient and ordered.       Medications:   Omeprazole  Albuterol neb  Albuterol HFA  Symbicort 160-4.5 mcg- discontinued  Spiriva Respimat 2.5 mcg- discontinued    Changed Symbicort and Spiriva to Trelegy. Patient has trouble remembering.       Smoking status:  Quit smoking in 2019 with a 30 pack year history.  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  No chemical exposure. No pets. No birds or turtles, no indoor hot tubs or saunas.     PRIOR WORKUP:  PFT:  PFT 6/3/2020: Normal flow-volume loop.  FEV1 92% of predicted with 5% postbronchodilator change.  FVC is 92% of predicted with a postbronchodilator 1% change.  RV is 145% of predicted TLC is 127% of predicted.  Diffusion capacity 110% of predicted final impression normal pulmonary function test.    CT Imaging:  CT lung screening 7/13/2021: No significant mediastinal axillary or hilar adenopathy.  Mild paraseptal emphysema in the upper lobes.  Few scattered streaky bandlike densities representing subsegmental atelectasis in the lung bases.  No lung nodules or masses.  No focal consolidation, pleural effusion or pneumothorax.    CT lung screening 6/25/2020: No mediastinal hilar or axillary adenopathy.  Mild paraseptal emphysema.  No significant lung nodules or masses.  No focal consolidation, pleural effusion or pneumothorax.      Sleep Study:  Titration study 9/9/2020: Patient was recommended for CPAP

## 2024-02-21 ENCOUNTER — OFFICE VISIT (OUTPATIENT)
Dept: PULMONOLOGY | Age: 64
End: 2024-02-21
Payer: MEDICARE

## 2024-02-21 VITALS
RESPIRATION RATE: 20 BRPM | HEIGHT: 62 IN | BODY MASS INDEX: 42.12 KG/M2 | HEART RATE: 83 BPM | TEMPERATURE: 97.9 F | WEIGHT: 228.9 LBS | OXYGEN SATURATION: 98 % | SYSTOLIC BLOOD PRESSURE: 145 MMHG | DIASTOLIC BLOOD PRESSURE: 92 MMHG

## 2024-02-21 DIAGNOSIS — J18.9 ATYPICAL PNEUMONIA: ICD-10-CM

## 2024-02-21 DIAGNOSIS — R06.09 EXERTIONAL DYSPNEA: ICD-10-CM

## 2024-02-21 DIAGNOSIS — Z87.891 PERSONAL HISTORY OF TOBACCO USE: Primary | ICD-10-CM

## 2024-02-21 DIAGNOSIS — E66.01 OBESITY, CLASS III, BMI 40-49.9 (MORBID OBESITY) (HCC): ICD-10-CM

## 2024-02-21 DIAGNOSIS — G47.33 OSA (OBSTRUCTIVE SLEEP APNEA): ICD-10-CM

## 2024-02-21 PROCEDURE — G0296 VISIT TO DETERM LDCT ELIG: HCPCS | Performed by: NURSE PRACTITIONER

## 2024-02-21 PROCEDURE — 99213 OFFICE O/P EST LOW 20 MIN: CPT | Performed by: NURSE PRACTITIONER

## 2024-02-21 RX ORDER — ALBUTEROL SULFATE 90 UG/1
2 AEROSOL, METERED RESPIRATORY (INHALATION) EVERY 6 HOURS PRN
Qty: 18 G | Refills: 11 | Status: SHIPPED | OUTPATIENT
Start: 2024-02-21

## 2024-02-21 RX ORDER — FLUTICASONE FUROATE, UMECLIDINIUM BROMIDE AND VILANTEROL TRIFENATATE 100; 62.5; 25 UG/1; UG/1; UG/1
1 POWDER RESPIRATORY (INHALATION) DAILY
Qty: 1 EACH | Refills: 11 | Status: SHIPPED | OUTPATIENT
Start: 2024-02-21 | End: 2024-02-22 | Stop reason: ALTCHOICE

## 2024-02-21 ASSESSMENT — ENCOUNTER SYMPTOMS
ALLERGIC/IMMUNOLOGIC NEGATIVE: 1
EYES NEGATIVE: 1
GASTROINTESTINAL NEGATIVE: 1

## 2024-02-21 NOTE — PATIENT INSTRUCTIONS
cancer from being exposed to small amounts of radiation is low. It's not a reason to avoid these tests for most people.  What are the benefits of screening?  Your scan may be normal (negative).  For some people who are at higher risk, screening lowers the chance of dying of lung cancer. How much and how long you smoked helps to determine your risk level. Screening can find some cancers early, when treatment may be more likely to work.  What happens after screening?  The results of your CT scan will be sent to your doctor. Someone from your care team will explain the results of your scan and answer any questions you may have. If you need any follow-up, he or she will help you understand what to do next.  After a lung cancer screening, you can go back to your usual activities right away.  A lung cancer screening test can't tell if you have lung cancer. If your results are positive, your doctor can't tell whether an abnormal finding is a harmless nodule, cancer, or something else without doing more tests.  What can you do to help prevent lung cancer?  Some lung cancers can't be prevented. But if you smoke, quitting smoking is the best step you can take to prevent lung cancer. If you want to quit, your doctor can recommend medicines or other ways to help.  Follow-up care is a key part of your treatment and safety. Be sure to make and go to all appointments, and call your doctor if you are having problems. It's also a good idea to know your test results and keep a list of the medicines you take.  Where can you learn more?  Go to https://www.[employer].net/patientEd and enter Q940 to learn more about \"Learning About Lung Cancer Screening.\"  Current as of: February 28, 2023               Content Version: 13.9  © 0658-2105 Healthwise, Incorporated.   Care instructions adapted under license by [employer]. If you have questions about a medical condition or this instruction, always ask your healthcare professional.

## 2024-02-22 ENCOUNTER — OFFICE VISIT (OUTPATIENT)
Dept: PRIMARY CARE CLINIC | Age: 64
End: 2024-02-22
Payer: MEDICARE

## 2024-02-22 ENCOUNTER — HOSPITAL ENCOUNTER (OUTPATIENT)
Age: 64
Discharge: HOME | End: 2024-02-22
Payer: MEDICARE

## 2024-02-22 VITALS
BODY MASS INDEX: 42.07 KG/M2 | HEART RATE: 82 BPM | TEMPERATURE: 98.5 F | SYSTOLIC BLOOD PRESSURE: 132 MMHG | RESPIRATION RATE: 20 BRPM | OXYGEN SATURATION: 97 % | DIASTOLIC BLOOD PRESSURE: 80 MMHG | WEIGHT: 230 LBS

## 2024-02-22 DIAGNOSIS — F33.1 MODERATE EPISODE OF RECURRENT MAJOR DEPRESSIVE DISORDER (HCC): ICD-10-CM

## 2024-02-22 DIAGNOSIS — Z13.220 LIPID SCREENING: ICD-10-CM

## 2024-02-22 DIAGNOSIS — R53.82 CHRONIC FATIGUE: ICD-10-CM

## 2024-02-22 DIAGNOSIS — D64.9 ANEMIA, UNSPECIFIED TYPE: ICD-10-CM

## 2024-02-22 DIAGNOSIS — E78.2 MIXED HYPERLIPIDEMIA: Primary | ICD-10-CM

## 2024-02-22 DIAGNOSIS — R53.82 CHRONIC FATIGUE: Primary | ICD-10-CM

## 2024-02-22 DIAGNOSIS — E78.2 MIXED HYPERLIPIDEMIA: ICD-10-CM

## 2024-02-22 LAB
ALBUMIN SERPL-MCNC: 4.2 G/DL (ref 3.5–5.2)
ALBUMIN/GLOB SERPL: 1.4 {RATIO} (ref 1–2.5)
ALP SERPL-CCNC: 125 U/L (ref 35–104)
ALT SERPL-CCNC: 24 U/L (ref 5–33)
ANION GAP SERPL CALCULATED.3IONS-SCNC: 13 MMOL/L (ref 9–17)
AST SERPL-CCNC: 19 U/L
BASOPHILS # BLD: 0.06 K/UL (ref 0–0.2)
BASOPHILS NFR BLD: 1 % (ref 0–2)
BILIRUB SERPL-MCNC: 0.3 MG/DL (ref 0.3–1.2)
BUN SERPL-MCNC: 14 MG/DL (ref 8–23)
BUN/CREAT SERPL: 16 (ref 9–20)
CALCIUM SERPL-MCNC: 9.4 MG/DL (ref 8.6–10.4)
CHLORIDE SERPL-SCNC: 101 MMOL/L (ref 98–107)
CHOLEST SERPL-MCNC: 200 MG/DL
CHOLESTEROL/HDL RATIO: 3.6
CO2 SERPL-SCNC: 25 MMOL/L (ref 20–31)
CREAT SERPL-MCNC: 0.9 MG/DL (ref 0.5–0.9)
EOSINOPHIL # BLD: 0.18 K/UL (ref 0–0.44)
EOSINOPHILS RELATIVE PERCENT: 3 % (ref 1–4)
ERYTHROCYTE [DISTWIDTH] IN BLOOD BY AUTOMATED COUNT: 13.1 % (ref 11.8–14.4)
FERRITIN SERPL-MCNC: 204 NG/ML (ref 13–150)
GFR SERPL CREATININE-BSD FRML MDRD: >60 ML/MIN/1.73M2
GLUCOSE SERPL-MCNC: 112 MG/DL (ref 70–99)
HCT VFR BLD AUTO: 44.5 % (ref 36.3–47.1)
HDLC SERPL-MCNC: 55 MG/DL
HGB BLD-MCNC: 14.8 G/DL (ref 11.9–15.1)
IMM GRANULOCYTES # BLD AUTO: 0.03 K/UL (ref 0–0.3)
IMM GRANULOCYTES NFR BLD: 1 %
IRON SATN MFR SERPL: 23 % (ref 20–55)
IRON SERPL-MCNC: 79 UG/DL (ref 37–145)
LDLC SERPL CALC-MCNC: 100 MG/DL (ref 0–130)
LYMPHOCYTES NFR BLD: 1.39 K/UL (ref 1.1–3.7)
LYMPHOCYTES RELATIVE PERCENT: 24 % (ref 24–43)
MCH RBC QN AUTO: 29.3 PG (ref 25.2–33.5)
MCHC RBC AUTO-ENTMCNC: 33.3 G/DL (ref 28.4–34.8)
MCV RBC AUTO: 88.1 FL (ref 82.6–102.9)
MONOCYTES NFR BLD: 0.41 K/UL (ref 0.1–1.2)
MONOCYTES NFR BLD: 7 % (ref 3–12)
NEUTROPHILS NFR BLD: 64 % (ref 36–65)
NEUTS SEG NFR BLD: 3.64 K/UL (ref 1.5–8.1)
NRBC BLD-RTO: 0 PER 100 WBC
PLATELET # BLD AUTO: 344 K/UL (ref 138–453)
PMV BLD AUTO: 9.2 FL (ref 8.1–13.5)
POTASSIUM SERPL-SCNC: 4.2 MMOL/L (ref 3.7–5.3)
PROT SERPL-MCNC: 7.3 G/DL (ref 6.4–8.3)
RBC # BLD AUTO: 5.05 M/UL (ref 3.95–5.11)
SODIUM SERPL-SCNC: 139 MMOL/L (ref 135–144)
T4 FREE SERPL-MCNC: 0.7 NG/DL (ref 0.9–1.7)
TIBC SERPL-MCNC: 345 UG/DL (ref 250–450)
TRIGL SERPL-MCNC: 227 MG/DL
TSH SERPL DL<=0.05 MIU/L-ACNC: 2.19 UIU/ML (ref 0.3–5)
UNSATURATED IRON BINDING CAPACITY: 266 UG/DL (ref 112–347)
WBC OTHER # BLD: 5.7 K/UL (ref 3.5–11.3)

## 2024-02-22 PROCEDURE — 85025 COMPLETE CBC W/AUTO DIFF WBC: CPT

## 2024-02-22 PROCEDURE — 99214 OFFICE O/P EST MOD 30 MIN: CPT | Performed by: NURSE PRACTITIONER

## 2024-02-22 PROCEDURE — 82746 ASSAY OF FOLIC ACID SERUM: CPT

## 2024-02-22 PROCEDURE — 82728 ASSAY OF FERRITIN: CPT

## 2024-02-22 PROCEDURE — 84439 ASSAY OF FREE THYROXINE: CPT

## 2024-02-22 PROCEDURE — 82607 VITAMIN B-12: CPT

## 2024-02-22 PROCEDURE — 83540 ASSAY OF IRON: CPT

## 2024-02-22 PROCEDURE — 3017F COLORECTAL CA SCREEN DOC REV: CPT | Performed by: NURSE PRACTITIONER

## 2024-02-22 PROCEDURE — 1036F TOBACCO NON-USER: CPT | Performed by: NURSE PRACTITIONER

## 2024-02-22 PROCEDURE — 3079F DIAST BP 80-89 MM HG: CPT | Performed by: NURSE PRACTITIONER

## 2024-02-22 PROCEDURE — 36415 COLL VENOUS BLD VENIPUNCTURE: CPT

## 2024-02-22 PROCEDURE — 80053 COMPREHEN METABOLIC PANEL: CPT

## 2024-02-22 PROCEDURE — G8427 DOCREV CUR MEDS BY ELIG CLIN: HCPCS | Performed by: NURSE PRACTITIONER

## 2024-02-22 PROCEDURE — 84443 ASSAY THYROID STIM HORMONE: CPT

## 2024-02-22 PROCEDURE — 83550 IRON BINDING TEST: CPT

## 2024-02-22 PROCEDURE — 3075F SYST BP GE 130 - 139MM HG: CPT | Performed by: NURSE PRACTITIONER

## 2024-02-22 PROCEDURE — G8417 CALC BMI ABV UP PARAM F/U: HCPCS | Performed by: NURSE PRACTITIONER

## 2024-02-22 PROCEDURE — 80061 LIPID PANEL: CPT

## 2024-02-22 PROCEDURE — G8482 FLU IMMUNIZE ORDER/ADMIN: HCPCS | Performed by: NURSE PRACTITIONER

## 2024-02-22 RX ORDER — ARIPIPRAZOLE 5 MG/1
5 TABLET ORAL DAILY
Qty: 90 TABLET | Refills: 0 | Status: SHIPPED | OUTPATIENT
Start: 2024-02-22

## 2024-02-22 RX ORDER — FLUTICASONE FUROATE, UMECLIDINIUM BROMIDE AND VILANTEROL TRIFENATATE 200; 62.5; 25 UG/1; UG/1; UG/1
1 POWDER RESPIRATORY (INHALATION) DAILY
COMMUNITY

## 2024-02-22 ASSESSMENT — PATIENT HEALTH QUESTIONNAIRE - PHQ9
8. MOVING OR SPEAKING SO SLOWLY THAT OTHER PEOPLE COULD HAVE NOTICED. OR THE OPPOSITE, BEING SO FIGETY OR RESTLESS THAT YOU HAVE BEEN MOVING AROUND A LOT MORE THAN USUAL: 0
SUM OF ALL RESPONSES TO PHQ QUESTIONS 1-9: 0
3. TROUBLE FALLING OR STAYING ASLEEP: 0
SUM OF ALL RESPONSES TO PHQ QUESTIONS 1-9: 0
6. FEELING BAD ABOUT YOURSELF - OR THAT YOU ARE A FAILURE OR HAVE LET YOURSELF OR YOUR FAMILY DOWN: 0
SUM OF ALL RESPONSES TO PHQ QUESTIONS 1-9: 0
5. POOR APPETITE OR OVEREATING: 0
2. FEELING DOWN, DEPRESSED OR HOPELESS: 0
10. IF YOU CHECKED OFF ANY PROBLEMS, HOW DIFFICULT HAVE THESE PROBLEMS MADE IT FOR YOU TO DO YOUR WORK, TAKE CARE OF THINGS AT HOME, OR GET ALONG WITH OTHER PEOPLE: 0
1. LITTLE INTEREST OR PLEASURE IN DOING THINGS: 0
7. TROUBLE CONCENTRATING ON THINGS, SUCH AS READING THE NEWSPAPER OR WATCHING TELEVISION: 0
9. THOUGHTS THAT YOU WOULD BE BETTER OFF DEAD, OR OF HURTING YOURSELF: 0
SUM OF ALL RESPONSES TO PHQ9 QUESTIONS 1 & 2: 0
4. FEELING TIRED OR HAVING LITTLE ENERGY: 0
SUM OF ALL RESPONSES TO PHQ QUESTIONS 1-9: 0

## 2024-02-22 ASSESSMENT — ENCOUNTER SYMPTOMS
ABDOMINAL PAIN: 0
SHORTNESS OF BREATH: 1
HYPERVENTILATION: 0
VOMITING: 0
SORE THROAT: 0
CHANGE IN BOWEL HABIT: 0
COUGH: 0
CHOKING: 0
SWOLLEN GLANDS: 0
NAUSEA: 0
VISUAL CHANGE: 0

## 2024-02-22 NOTE — PATIENT INSTRUCTIONS
SURVEY:     You may be receiving a survey from [facility] [employer] regarding your visit today.     Please complete the survey to enable us to provide the highest quality of care to you and your family.     If you cannot score us a very good on any question, please call the office to discuss how we could have made your experience a very good one.     Thank you,    Raj Salgado, APRN-CNP  Diana Ramos, APRN-CNP  Cindy, LPN  Teresa, CMA  Nicho, CMA  Antonia, CMA  Sofia, PCA  Georgina, CMA  Vivian, PM

## 2024-02-23 ENCOUNTER — TELEPHONE (OUTPATIENT)
Dept: PRIMARY CARE CLINIC | Age: 64
End: 2024-02-23

## 2024-02-23 LAB
FOLATE SERPL-MCNC: 8.4 NG/ML (ref 4.8–24.2)
VIT B12 SERPL-MCNC: 381 PG/ML (ref 232–1245)

## 2024-02-23 NOTE — TELEPHONE ENCOUNTER
----- Message from OLIVER Espinoza CNP sent at 2/23/2024  6:51 AM EST -----  Labs are stable.  Iron levels normal.  Continue all current medications.

## 2024-03-07 ENCOUNTER — TELEPHONE (OUTPATIENT)
Dept: ONCOLOGY | Age: 64
End: 2024-03-07

## 2024-03-14 ENCOUNTER — OFFICE VISIT (OUTPATIENT)
Dept: PRIMARY CARE CLINIC | Age: 64
End: 2024-03-14
Payer: MEDICARE

## 2024-03-14 VITALS
DIASTOLIC BLOOD PRESSURE: 82 MMHG | WEIGHT: 232.5 LBS | BODY MASS INDEX: 46.87 KG/M2 | HEART RATE: 88 BPM | OXYGEN SATURATION: 96 % | TEMPERATURE: 98.5 F | SYSTOLIC BLOOD PRESSURE: 130 MMHG | HEIGHT: 59 IN | RESPIRATION RATE: 22 BRPM

## 2024-03-14 DIAGNOSIS — Z00.00 WELCOME TO MEDICARE PREVENTIVE VISIT: Primary | ICD-10-CM

## 2024-03-14 DIAGNOSIS — F34.1 DYSTHYMIA: ICD-10-CM

## 2024-03-14 DIAGNOSIS — E03.8 SUBCLINICAL HYPOTHYROIDISM: ICD-10-CM

## 2024-03-14 PROCEDURE — 3075F SYST BP GE 130 - 139MM HG: CPT | Performed by: NURSE PRACTITIONER

## 2024-03-14 PROCEDURE — 3017F COLORECTAL CA SCREEN DOC REV: CPT | Performed by: NURSE PRACTITIONER

## 2024-03-14 PROCEDURE — G0402 INITIAL PREVENTIVE EXAM: HCPCS | Performed by: NURSE PRACTITIONER

## 2024-03-14 PROCEDURE — 3079F DIAST BP 80-89 MM HG: CPT | Performed by: NURSE PRACTITIONER

## 2024-03-14 RX ORDER — LEVOTHYROXINE SODIUM 0.03 MG/1
25 TABLET ORAL DAILY
Qty: 90 TABLET | Refills: 1 | Status: SHIPPED | OUTPATIENT
Start: 2024-03-14

## 2024-03-14 ASSESSMENT — ENCOUNTER SYMPTOMS
SORE THROAT: 0
VISUAL CHANGE: 0
DIARRHEA: 0
CONSTIPATION: 0
VOMITING: 0
HOARSE VOICE: 0
COUGH: 0
HAIR LOSS: 0
NAUSEA: 0
SHORTNESS OF BREATH: 1
CHOKING: 0
ABDOMINAL PAIN: 0

## 2024-03-14 ASSESSMENT — PATIENT HEALTH QUESTIONNAIRE - PHQ9
6. FEELING BAD ABOUT YOURSELF - OR THAT YOU ARE A FAILURE OR HAVE LET YOURSELF OR YOUR FAMILY DOWN: 0
5. POOR APPETITE OR OVEREATING: 0
8. MOVING OR SPEAKING SO SLOWLY THAT OTHER PEOPLE COULD HAVE NOTICED. OR THE OPPOSITE, BEING SO FIGETY OR RESTLESS THAT YOU HAVE BEEN MOVING AROUND A LOT MORE THAN USUAL: 0
SUM OF ALL RESPONSES TO PHQ QUESTIONS 1-9: 0
10. IF YOU CHECKED OFF ANY PROBLEMS, HOW DIFFICULT HAVE THESE PROBLEMS MADE IT FOR YOU TO DO YOUR WORK, TAKE CARE OF THINGS AT HOME, OR GET ALONG WITH OTHER PEOPLE: 0
1. LITTLE INTEREST OR PLEASURE IN DOING THINGS: 0
SUM OF ALL RESPONSES TO PHQ QUESTIONS 1-9: 0
SUM OF ALL RESPONSES TO PHQ QUESTIONS 1-9: 0
9. THOUGHTS THAT YOU WOULD BE BETTER OFF DEAD, OR OF HURTING YOURSELF: 0
7. TROUBLE CONCENTRATING ON THINGS, SUCH AS READING THE NEWSPAPER OR WATCHING TELEVISION: 0
3. TROUBLE FALLING OR STAYING ASLEEP: 0
4. FEELING TIRED OR HAVING LITTLE ENERGY: 0
2. FEELING DOWN, DEPRESSED OR HOPELESS: 0
SUM OF ALL RESPONSES TO PHQ9 QUESTIONS 1 & 2: 0
SUM OF ALL RESPONSES TO PHQ QUESTIONS 1-9: 0

## 2024-03-14 ASSESSMENT — LIFESTYLE VARIABLES
HOW OFTEN DURING THE LAST YEAR HAVE YOU HAD A FEELING OF GUILT OR REMORSE AFTER DRINKING: 0
HAS A RELATIVE, FRIEND, DOCTOR, OR ANOTHER HEALTH PROFESSIONAL EXPRESSED CONCERN ABOUT YOUR DRINKING OR SUGGESTED YOU CUT DOWN: 0
HOW OFTEN DURING THE LAST YEAR HAVE YOU FAILED TO DO WHAT WAS NORMALLY EXPECTED FROM YOU BECAUSE OF DRINKING: 0
HOW OFTEN DO YOU HAVE A DRINK CONTAINING ALCOHOL: 4 OR MORE TIMES A WEEK
HOW OFTEN DURING THE LAST YEAR HAVE YOU BEEN UNABLE TO REMEMBER WHAT HAPPENED THE NIGHT BEFORE BECAUSE YOU HAD BEEN DRINKING: 0
HOW MANY STANDARD DRINKS CONTAINING ALCOHOL DO YOU HAVE ON A TYPICAL DAY: 3 OR 4
HOW OFTEN DURING THE LAST YEAR HAVE YOU FOUND THAT YOU WERE NOT ABLE TO STOP DRINKING ONCE YOU HAD STARTED: 0
HAVE YOU OR SOMEONE ELSE BEEN INJURED AS A RESULT OF YOUR DRINKING: 0
HOW OFTEN DURING THE LAST YEAR HAVE YOU NEEDED AN ALCOHOLIC DRINK FIRST THING IN THE MORNING TO GET YOURSELF GOING AFTER A NIGHT OF HEAVY DRINKING: 0

## 2024-03-14 NOTE — PROGRESS NOTES
Medicare Annual Wellness Visit    Renata Hackett is here for Medicare AWV (AWV) and Mental Health Problem (Routine check. No check.)    Assessment & Plan   Welcome to Medicare preventive visit  Dysthymia  Subclinical hypothyroidism  -     levothyroxine (SYNTHROID) 25 MCG tablet; Take 1 tablet by mouth daily, Disp-90 tablet, R-1Normal  -     TSH; Future  -     T4, Free; Future    Recommendations for Preventive Services Due: see orders and patient instructions/AVS.  Recommended screening schedule for the next 5-10 years is provided to the patient in written form: see Patient Instructions/AVS.     Return in 6 months (on 9/14/2024).     Subjective   The following acute and/or chronic problems were also addressed today:  Renata is here today for a Medicare annual wellness visit and routine office visit.          Mental Health Problem  The primary symptoms include dysphoric mood (improved). The primary symptoms do not include delusions, hallucinations, bizarre behavior, disorganized speech, negative symptoms or somatic symptoms. The current episode started more than 1 month ago. This is a chronic problem.   The onset of the illness is precipitated by emotional stress. The degree of incapacity that she is experiencing as a consequence of her illness is moderate. Sequelae of the illness include harmed interpersonal relations. Additional symptoms of the illness include unexpected weight change, fatigue and distractible. Additional symptoms of the illness do not include anhedonia, insomnia, hypersomnia, appetite change, agitation, psychomotor retardation, feelings of worthlessness, attention impairment, euphoric mood, increased goal-directed activity, flight of ideas, inflated self-esteem, decreased need for sleep, poor judgment, visual change, headaches, abdominal pain or seizures. She does not admit to suicidal ideas. She does not have a plan to attempt suicide. She does not contemplate harming herself. She has not already

## 2024-03-18 ENCOUNTER — HOSPITAL ENCOUNTER (OUTPATIENT)
Age: 64
Discharge: HOME | End: 2024-03-20
Payer: MEDICARE

## 2024-03-18 ENCOUNTER — HOSPITAL ENCOUNTER (OUTPATIENT)
Dept: CT IMAGING | Age: 64
Discharge: HOME | End: 2024-03-20
Payer: MEDICARE

## 2024-03-18 VITALS
BODY MASS INDEX: 46.89 KG/M2 | WEIGHT: 232.59 LBS | DIASTOLIC BLOOD PRESSURE: 82 MMHG | SYSTOLIC BLOOD PRESSURE: 130 MMHG | HEIGHT: 59 IN

## 2024-03-18 DIAGNOSIS — Z87.891 PERSONAL HISTORY OF TOBACCO USE: ICD-10-CM

## 2024-03-18 DIAGNOSIS — R06.09 EXERTIONAL DYSPNEA: ICD-10-CM

## 2024-03-18 LAB
ECHO AV CUSP MM: 2.3 CM
ECHO AV MEAN GRADIENT: 6 MMHG
ECHO AV MEAN VELOCITY: 1.1 M/S
ECHO AV PEAK GRADIENT: 11 MMHG
ECHO AV PEAK VELOCITY: 1.7 M/S
ECHO AV VELOCITY RATIO: 0.88
ECHO AV VTI: 26 CM
ECHO BSA: 2.1 M2
ECHO LA AREA 2C: 11.6 CM2
ECHO LA AREA 4C: 13.6 CM2
ECHO LA MAJOR AXIS: 4.8 CM
ECHO LA MINOR AXIS: 4.7 CM
ECHO LA VOL BP: 27 ML (ref 22–52)
ECHO LA VOL MOD A2C: 24 ML (ref 22–52)
ECHO LA VOL MOD A4C: 31 ML (ref 22–52)
ECHO LA VOL/BSA BIPLANE: 14 ML/M2 (ref 16–34)
ECHO LA VOLUME INDEX MOD A2C: 12 ML/M2 (ref 16–34)
ECHO LA VOLUME INDEX MOD A4C: 16 ML/M2 (ref 16–34)
ECHO LV E' LATERAL VELOCITY: 9 CM/S
ECHO LV EDV A2C: 27 ML
ECHO LV EDV A4C: 44 ML
ECHO LV EDV INDEX A4C: 22 ML/M2
ECHO LV EDV NDEX A2C: 14 ML/M2
ECHO LV EJECTION FRACTION A2C: 54 %
ECHO LV EJECTION FRACTION A4C: 63 %
ECHO LV EJECTION FRACTION BIPLANE: 59 % (ref 55–100)
ECHO LV ESV A2C: 13 ML
ECHO LV ESV A4C: 16 ML
ECHO LV ESV INDEX A2C: 7 ML/M2
ECHO LV ESV INDEX A4C: 8 ML/M2
ECHO LV FRACTIONAL SHORTENING: 33 % (ref 28–44)
ECHO LV INTERNAL DIMENSION DIASTOLE INDEX: 2.34 CM/M2
ECHO LV INTERNAL DIMENSION DIASTOLIC: 4.6 CM (ref 3.9–5.3)
ECHO LV INTERNAL DIMENSION SYSTOLIC INDEX: 1.57 CM/M2
ECHO LV INTERNAL DIMENSION SYSTOLIC: 3.1 CM
ECHO LV IVSD: 0.9 CM (ref 0.6–0.9)
ECHO LV MASS 2D: 137.7 G (ref 67–162)
ECHO LV MASS INDEX 2D: 69.9 G/M2 (ref 43–95)
ECHO LV POSTERIOR WALL DIASTOLIC: 0.9 CM (ref 0.6–0.9)
ECHO LV RELATIVE WALL THICKNESS RATIO: 0.39
ECHO LVOT AV VTI INDEX: 0.98
ECHO LVOT MEAN GRADIENT: 5 MMHG
ECHO LVOT PEAK GRADIENT: 9 MMHG
ECHO LVOT PEAK VELOCITY: 1.5 M/S
ECHO LVOT VTI: 25.4 CM
ECHO MV A VELOCITY: 1.1 M/S
ECHO MV E DECELERATION TIME (DT): 190 MS
ECHO MV E VELOCITY: 0.82 M/S
ECHO MV E/A RATIO: 0.75
ECHO MV E/E' LATERAL: 9.11
ECHO PV MAX VELOCITY: 1.3 M/S
ECHO PV PEAK GRADIENT: 6 MMHG
ECHO TV REGURGITANT MAX VELOCITY: 2.62 M/S
ECHO TV REGURGITANT PEAK GRADIENT: 27 MMHG

## 2024-03-18 PROCEDURE — 93306 TTE W/DOPPLER COMPLETE: CPT

## 2024-03-18 PROCEDURE — 71271 CT THORAX LUNG CANCER SCR C-: CPT

## 2024-03-18 PROCEDURE — 93306 TTE W/DOPPLER COMPLETE: CPT | Performed by: FAMILY MEDICINE

## 2024-03-20 ENCOUNTER — PATIENT MESSAGE (OUTPATIENT)
Dept: PRIMARY CARE CLINIC | Age: 64
End: 2024-03-20

## 2024-03-20 DIAGNOSIS — F34.1 DYSTHYMIA: ICD-10-CM

## 2024-03-20 RX ORDER — DULOXETIN HYDROCHLORIDE 30 MG/1
30 CAPSULE, DELAYED RELEASE ORAL DAILY
Qty: 90 CAPSULE | Refills: 1 | Status: SHIPPED | OUTPATIENT
Start: 2024-03-20

## 2024-03-20 NOTE — TELEPHONE ENCOUNTER
Called and let patient know, I let her know to reach out to pulmonology for those results since they are the ones that ordered it. Information given to patient.

## 2024-03-20 NOTE — TELEPHONE ENCOUNTER
The test is essentially normal to me.  The ordering provider will get back to her with any further recommendations but at this point there is nothing she needs to do immediately.

## 2024-03-20 NOTE — TELEPHONE ENCOUNTER
From: Renata Hackett  To: Raj Salgado  Sent: 3/20/2024 10:22 AM EDT  Subject: Echo results     I read the result or attempted to of the echocardiogram and it said abnormal and I panicked of course I was wondering if you had read it and if you could just let me know what I need to do

## 2024-03-22 DIAGNOSIS — I10 PRIMARY HYPERTENSION: ICD-10-CM

## 2024-03-22 RX ORDER — LOSARTAN POTASSIUM AND HYDROCHLOROTHIAZIDE 12.5; 5 MG/1; MG/1
1 TABLET ORAL DAILY
Qty: 90 TABLET | Refills: 0 | Status: SHIPPED | OUTPATIENT
Start: 2024-03-22

## 2024-03-22 NOTE — TELEPHONE ENCOUNTER
Health Maintenance   Topic Date Due    Breast cancer screen  12/20/2023    Hepatitis C screen  09/07/2024 (Originally [birth year]1977)    HIV screen  09/07/2024 (Originally 7/12/1974)    Pneumococcal 0-64 years Vaccine (2 of 2 - PCV) 09/19/2024 (Originally 6/4/2020)    Shingles vaccine (1 of 2) 11/07/2024 (Originally 7/12/2009)    COVID-19 Vaccine (3 - 2023-24 season) 11/07/2024 (Originally 9/1/2023)    Respiratory Syncytial Virus (RSV) Pregnant or age 60 yrs+ (1 - 1-dose 60+ series) 12/13/2024 (Originally 7/12/2019)    DTaP/Tdap/Td vaccine (1 - Tdap) 03/14/2025 (Originally 7/12/1978)    Depression Monitoring  03/14/2025    Diabetes screen  03/29/2025    Colorectal Cancer Screen  09/25/2028    Lipids  02/22/2029    Annual Wellness Visit (Medicare Advantage)  Completed    Flu vaccine  Completed    Hepatitis A vaccine  Aged Out    Hepatitis B vaccine  Aged Out    Hib vaccine  Aged Out    Polio vaccine  Aged Out    Meningococcal (ACWY) vaccine  Aged Out    Low dose CT lung screening &/or counseling  Discontinued    Cervical cancer screen  Discontinued             (applicable per patient's age: Cancer Screenings, Depression Screening, Fall Risk Screening, Immunizations)    Hemoglobin A1C (%)   Date Value   03/29/2022 5.6   12/10/2020 5.3   05/30/2019 5.3     LDL Cholesterol (mg/dL)   Date Value   02/22/2024 100     AST (U/L)   Date Value   02/22/2024 19     ALT (U/L)   Date Value   02/22/2024 24     BUN (mg/dL)   Date Value   02/22/2024 14      (goal A1C is < 7)   (goal LDL is <100) need 30-50% reduction from baseline     BP Readings from Last 3 Encounters:   03/18/24 130/82   03/14/24 130/82   02/22/24 132/80    (goal /80)      All Future Testing planned in [facility]:  Lab Frequency Next Occurrence   CHITO DENITA DIGITAL SCREEN BILATERAL Once 01/03/2024   TSH Once 09/10/2024   T4, Free Once 09/10/2024       Next Visit Date:  Future Appointments   Date Time Provider Department Center   4/8/2024 10:00 AM [facility] MAMMOGRAPHY ROOM

## 2024-03-26 DIAGNOSIS — R93.1 ABNORMAL ECHOCARDIOGRAM: Primary | ICD-10-CM

## 2024-04-01 ENCOUNTER — OFFICE VISIT (OUTPATIENT)
Dept: PRIMARY CARE CLINIC | Age: 64
End: 2024-04-01
Payer: MEDICARE

## 2024-04-01 VITALS
DIASTOLIC BLOOD PRESSURE: 82 MMHG | SYSTOLIC BLOOD PRESSURE: 138 MMHG | WEIGHT: 238.8 LBS | RESPIRATION RATE: 16 BRPM | BODY MASS INDEX: 48.14 KG/M2 | OXYGEN SATURATION: 97 % | TEMPERATURE: 97.4 F | HEART RATE: 92 BPM

## 2024-04-01 DIAGNOSIS — J01.40 ACUTE NON-RECURRENT PANSINUSITIS: Primary | ICD-10-CM

## 2024-04-01 DIAGNOSIS — J20.9 BRONCHITIS WITH BRONCHOSPASM: ICD-10-CM

## 2024-04-01 PROCEDURE — 3075F SYST BP GE 130 - 139MM HG: CPT | Performed by: NURSE PRACTITIONER

## 2024-04-01 PROCEDURE — 1036F TOBACCO NON-USER: CPT | Performed by: NURSE PRACTITIONER

## 2024-04-01 PROCEDURE — G8417 CALC BMI ABV UP PARAM F/U: HCPCS | Performed by: NURSE PRACTITIONER

## 2024-04-01 PROCEDURE — 3079F DIAST BP 80-89 MM HG: CPT | Performed by: NURSE PRACTITIONER

## 2024-04-01 PROCEDURE — 3017F COLORECTAL CA SCREEN DOC REV: CPT | Performed by: NURSE PRACTITIONER

## 2024-04-01 PROCEDURE — G8427 DOCREV CUR MEDS BY ELIG CLIN: HCPCS | Performed by: NURSE PRACTITIONER

## 2024-04-01 PROCEDURE — 99214 OFFICE O/P EST MOD 30 MIN: CPT | Performed by: NURSE PRACTITIONER

## 2024-04-01 RX ORDER — PREDNISONE 20 MG/1
20 TABLET ORAL 2 TIMES DAILY
Qty: 10 TABLET | Refills: 0 | Status: SHIPPED | OUTPATIENT
Start: 2024-04-01 | End: 2024-04-06

## 2024-04-01 RX ORDER — GUAIFENESIN 600 MG/1
600 TABLET, EXTENDED RELEASE ORAL 2 TIMES DAILY
Qty: 30 TABLET | Refills: 0 | Status: SHIPPED | OUTPATIENT
Start: 2024-04-01 | End: 2024-04-16

## 2024-04-01 RX ORDER — FLUTICASONE FUROATE, UMECLIDINIUM BROMIDE AND VILANTEROL TRIFENATATE 200; 62.5; 25 UG/1; UG/1; UG/1
1 POWDER RESPIRATORY (INHALATION) DAILY
Qty: 1 EACH | Refills: 3 | Status: SHIPPED | OUTPATIENT
Start: 2024-04-01 | End: 2024-05-01

## 2024-04-01 RX ORDER — BENZONATATE 100 MG/1
100-200 CAPSULE ORAL 3 TIMES DAILY PRN
Qty: 60 CAPSULE | Refills: 0 | Status: SHIPPED | OUTPATIENT
Start: 2024-04-01 | End: 2024-04-08

## 2024-04-01 RX ORDER — DOXYCYCLINE HYCLATE 100 MG
100 TABLET ORAL 2 TIMES DAILY
Qty: 20 TABLET | Refills: 0 | Status: SHIPPED | OUTPATIENT
Start: 2024-04-01 | End: 2024-04-11

## 2024-04-01 ASSESSMENT — PATIENT HEALTH QUESTIONNAIRE - PHQ9
6. FEELING BAD ABOUT YOURSELF - OR THAT YOU ARE A FAILURE OR HAVE LET YOURSELF OR YOUR FAMILY DOWN: NOT AT ALL
4. FEELING TIRED OR HAVING LITTLE ENERGY: NOT AT ALL
7. TROUBLE CONCENTRATING ON THINGS, SUCH AS READING THE NEWSPAPER OR WATCHING TELEVISION: NOT AT ALL
8. MOVING OR SPEAKING SO SLOWLY THAT OTHER PEOPLE COULD HAVE NOTICED. OR THE OPPOSITE, BEING SO FIGETY OR RESTLESS THAT YOU HAVE BEEN MOVING AROUND A LOT MORE THAN USUAL: NOT AT ALL
SUM OF ALL RESPONSES TO PHQ QUESTIONS 1-9: 0
SUM OF ALL RESPONSES TO PHQ QUESTIONS 1-9: 0
5. POOR APPETITE OR OVEREATING: NOT AT ALL
SUM OF ALL RESPONSES TO PHQ QUESTIONS 1-9: 0
10. IF YOU CHECKED OFF ANY PROBLEMS, HOW DIFFICULT HAVE THESE PROBLEMS MADE IT FOR YOU TO DO YOUR WORK, TAKE CARE OF THINGS AT HOME, OR GET ALONG WITH OTHER PEOPLE: NOT DIFFICULT AT ALL
SUM OF ALL RESPONSES TO PHQ QUESTIONS 1-9: 0
3. TROUBLE FALLING OR STAYING ASLEEP: NOT AT ALL
1. LITTLE INTEREST OR PLEASURE IN DOING THINGS: NOT AT ALL
2. FEELING DOWN, DEPRESSED OR HOPELESS: NOT AT ALL
9. THOUGHTS THAT YOU WOULD BE BETTER OFF DEAD, OR OF HURTING YOURSELF: NOT AT ALL
SUM OF ALL RESPONSES TO PHQ9 QUESTIONS 1 & 2: 0

## 2024-04-01 ASSESSMENT — ENCOUNTER SYMPTOMS
SORE THROAT: 1
GASTROINTESTINAL NEGATIVE: 1
CHEST TIGHTNESS: 1
SINUS PRESSURE: 1
RHINORRHEA: 1
COUGH: 1
EYES NEGATIVE: 1
ALLERGIC/IMMUNOLOGIC NEGATIVE: 1
SHORTNESS OF BREATH: 1
WHEEZING: 1
SINUS PAIN: 1

## 2024-04-01 NOTE — PATIENT INSTRUCTIONS
SURVEY:     You may be receiving a survey from [facility] [employer] regarding your visit today.     Please complete the survey to enable us to provide the highest quality of care to you and your family.     If you cannot score us a very good on any question, please call the office to discuss how we could have made your experience a very good one.     Thank you,    Raj Salgado, APRN-CNP  Diana Ramos, APRN-CNP  Cindy, LPN  Teresa, CMA  Nicho, CMA  Antonia, CMA  Sofia, PCA  Georgina, CMA  Vivian, PM

## 2024-04-04 DIAGNOSIS — J44.1 COPD WITH ACUTE EXACERBATION (HCC): ICD-10-CM

## 2024-04-04 RX ORDER — ALBUTEROL SULFATE 2.5 MG/3ML
SOLUTION RESPIRATORY (INHALATION)
Qty: 120 EACH | Refills: 1 | Status: SHIPPED | OUTPATIENT
Start: 2024-04-04

## 2024-04-04 NOTE — TELEPHONE ENCOUNTER
Health Maintenance   Topic Date Due    Breast cancer screen  12/20/2023    Hepatitis C screen  09/07/2024 (Originally 7/12/1977)    HIV screen  09/07/2024 (Originally 7/12/1974)    Pneumococcal 0-64 years Vaccine (2 of 2 - PCV) 09/19/2024 (Originally 6/4/2020)    Shingles vaccine (1 of 2) 11/07/2024 (Originally 7/12/2009)    COVID-19 Vaccine (3 - 2023-24 season) 11/07/2024 (Originally 9/1/2023)    Respiratory Syncytial Virus (RSV) Pregnant or age 60 yrs+ (1 - 1-dose 60+ series) 12/13/2024 (Originally 7/12/2019)    DTaP/Tdap/Td vaccine (1 - Tdap) 03/14/2025 (Originally 7/12/1978)    Diabetes screen  03/29/2025    Depression Monitoring  04/01/2025    Colorectal Cancer Screen  09/25/2028    Lipids  02/22/2029    Annual Wellness Visit (Medicare Advantage)  Completed    Flu vaccine  Completed    Hepatitis A vaccine  Aged Out    Hepatitis B vaccine  Aged Out    Hib vaccine  Aged Out    Polio vaccine  Aged Out    Meningococcal (ACWY) vaccine  Aged Out    Low dose CT lung screening &/or counseling  Discontinued    Cervical cancer screen  Discontinued             (applicable per patient's age: Cancer Screenings, Depression Screening, Fall Risk Screening, Immunizations)    Hemoglobin A1C (%)   Date Value   03/29/2022 5.6   12/10/2020 5.3   05/30/2019 5.3     LDL Cholesterol (mg/dL)   Date Value   02/22/2024 100     AST (U/L)   Date Value   02/22/2024 19     ALT (U/L)   Date Value   02/22/2024 24     BUN (mg/dL)   Date Value   02/22/2024 14      (goal A1C is < 7)   (goal LDL is <100) need 30-50% reduction from baseline     BP Readings from Last 3 Encounters:   04/01/24 138/82   03/18/24 130/82   03/14/24 130/82    (goal /80)      All Future Testing planned in [facility]:  Lab Frequency Next Occurrence   CHITO DENITA DIGITAL SCREEN BILATERAL Once 01/03/2024   TSH Once 09/10/2024   T4, Free Once 09/10/2024       Next Visit Date:  Future Appointments   Date Time Provider Department Center   4/8/2024 10:00 AM [facility] MAMMOGRAPHY ROOM

## 2024-04-08 ENCOUNTER — HOSPITAL ENCOUNTER (OUTPATIENT)
Dept: WOMENS IMAGING | Age: 64
Discharge: HOME | End: 2024-04-10
Payer: MEDICARE

## 2024-04-08 VITALS — WEIGHT: 235 LBS | BODY MASS INDEX: 43.24 KG/M2 | HEIGHT: 62 IN

## 2024-04-08 DIAGNOSIS — Z12.31 VISIT FOR SCREENING MAMMOGRAM: ICD-10-CM

## 2024-04-08 PROCEDURE — 77063 BREAST TOMOSYNTHESIS BI: CPT

## 2024-04-09 ENCOUNTER — OFFICE VISIT (OUTPATIENT)
Dept: PRIMARY CARE CLINIC | Age: 64
End: 2024-04-09
Payer: MEDICARE

## 2024-04-09 ENCOUNTER — TELEPHONE (OUTPATIENT)
Dept: PRIMARY CARE CLINIC | Age: 64
End: 2024-04-09

## 2024-04-09 VITALS
DIASTOLIC BLOOD PRESSURE: 80 MMHG | BODY MASS INDEX: 42.87 KG/M2 | SYSTOLIC BLOOD PRESSURE: 132 MMHG | OXYGEN SATURATION: 98 % | HEART RATE: 104 BPM | TEMPERATURE: 97.6 F | RESPIRATION RATE: 16 BRPM | WEIGHT: 234.4 LBS

## 2024-04-09 DIAGNOSIS — B37.0 THRUSH, ORAL: Primary | ICD-10-CM

## 2024-04-09 PROCEDURE — 3075F SYST BP GE 130 - 139MM HG: CPT | Performed by: NURSE PRACTITIONER

## 2024-04-09 PROCEDURE — G8427 DOCREV CUR MEDS BY ELIG CLIN: HCPCS | Performed by: NURSE PRACTITIONER

## 2024-04-09 PROCEDURE — 99213 OFFICE O/P EST LOW 20 MIN: CPT | Performed by: NURSE PRACTITIONER

## 2024-04-09 PROCEDURE — 1036F TOBACCO NON-USER: CPT | Performed by: NURSE PRACTITIONER

## 2024-04-09 PROCEDURE — G8417 CALC BMI ABV UP PARAM F/U: HCPCS | Performed by: NURSE PRACTITIONER

## 2024-04-09 PROCEDURE — 3079F DIAST BP 80-89 MM HG: CPT | Performed by: NURSE PRACTITIONER

## 2024-04-09 PROCEDURE — 3017F COLORECTAL CA SCREEN DOC REV: CPT | Performed by: NURSE PRACTITIONER

## 2024-04-09 ASSESSMENT — PATIENT HEALTH QUESTIONNAIRE - PHQ9
SUM OF ALL RESPONSES TO PHQ QUESTIONS 1-9: 0
SUM OF ALL RESPONSES TO PHQ9 QUESTIONS 1 & 2: 0
5. POOR APPETITE OR OVEREATING: NOT AT ALL
6. FEELING BAD ABOUT YOURSELF - OR THAT YOU ARE A FAILURE OR HAVE LET YOURSELF OR YOUR FAMILY DOWN: NOT AT ALL
7. TROUBLE CONCENTRATING ON THINGS, SUCH AS READING THE NEWSPAPER OR WATCHING TELEVISION: NOT AT ALL
3. TROUBLE FALLING OR STAYING ASLEEP: NOT AT ALL
SUM OF ALL RESPONSES TO PHQ QUESTIONS 1-9: 0
9. THOUGHTS THAT YOU WOULD BE BETTER OFF DEAD, OR OF HURTING YOURSELF: NOT AT ALL
SUM OF ALL RESPONSES TO PHQ QUESTIONS 1-9: 0
10. IF YOU CHECKED OFF ANY PROBLEMS, HOW DIFFICULT HAVE THESE PROBLEMS MADE IT FOR YOU TO DO YOUR WORK, TAKE CARE OF THINGS AT HOME, OR GET ALONG WITH OTHER PEOPLE: NOT DIFFICULT AT ALL
8. MOVING OR SPEAKING SO SLOWLY THAT OTHER PEOPLE COULD HAVE NOTICED. OR THE OPPOSITE, BEING SO FIGETY OR RESTLESS THAT YOU HAVE BEEN MOVING AROUND A LOT MORE THAN USUAL: NOT AT ALL
2. FEELING DOWN, DEPRESSED OR HOPELESS: NOT AT ALL
4. FEELING TIRED OR HAVING LITTLE ENERGY: NOT AT ALL
SUM OF ALL RESPONSES TO PHQ QUESTIONS 1-9: 0
1. LITTLE INTEREST OR PLEASURE IN DOING THINGS: NOT AT ALL

## 2024-04-09 NOTE — PATIENT INSTRUCTIONS
SURVEY:     You may be receiving a survey from [facility] [employer] regarding your visit today.     Please complete the survey to enable us to provide the highest quality of care to you and your family.     If you cannot score us a very good on any question, please call the office to discuss how we could have made your experience a very good one.     Thank you,    Raj Salgado, APRN-CNP  Diana Ramos, APRN-CNP  Cindy, LPN  Teresa, CMA  Nicho, CMA  Antonia, CMA  Sofia, PCA  Georgina, CMA  Vivian, PM

## 2024-04-09 NOTE — TELEPHONE ENCOUNTER
----- Message from OLIVER Espinoza CNP sent at 4/8/2024 10:18 PM EDT -----  Results are normal, please call patient and make them aware.

## 2024-04-09 NOTE — PROGRESS NOTES
12:31 PM    MCV 88.1 02/22/2024 12:31 PM    MCH 29.3 02/22/2024 12:31 PM    MCHC 33.3 02/22/2024 12:31 PM    RDW 13.1 02/22/2024 12:31 PM     02/22/2024 12:31 PM    MPV 9.2 02/22/2024 12:31 PM     Lab Results   Component Value Date/Time    TSH 2.19 02/22/2024 12:32 PM     Lab Results   Component Value Date/Time    CHOL 200 02/22/2024 12:30 PM    HDL 55 02/22/2024 12:30 PM    LABA1C 5.6 03/29/2022 10:35 AM       Assessment/Plan:      Diagnosis Orders   1. Thrush, oral  nystatin (MYCOSTATIN) 502924 UNIT/ML suspension        Start Nystatin 500,000 units QID.  Return to office if no improvement.    May continue to use mouth wash for comfort.  Avoid spicy or high acidic foods.    1.  Renata received counseling on healthy behaviors and Education discussed during visit.  2.  Patient given educational materials - see patient instructions  3.  Patient was provided AVS.  4.  Discussed use, benefit, and side effects of prescribed medications.  Barriers to medication compliance addressed.  All patient questions answered.Pt voiced understanding.   5.  Reviewed prior labs and health maintenance  6.  Continue current medications, diet and exercise.    Completed Refills   Requested Prescriptions     Signed Prescriptions Disp Refills    nystatin (MYCOSTATIN) [id number] UNIT/ML suspension 200 mL 0     Sig: Take 5 mLs by mouth 4 times daily for 10 days Retain in mouth as long as possible         No follow-ups on file.

## 2024-04-11 ASSESSMENT — ENCOUNTER SYMPTOMS
GASTROINTESTINAL NEGATIVE: 1
RESPIRATORY NEGATIVE: 1
SORE THROAT: 1
ALLERGIC/IMMUNOLOGIC NEGATIVE: 1
EYES NEGATIVE: 1

## 2024-04-18 ENCOUNTER — HOSPITAL ENCOUNTER (OUTPATIENT)
Age: 64
Discharge: HOME | End: 2024-04-18
Payer: MEDICARE

## 2024-04-18 ENCOUNTER — OFFICE VISIT (OUTPATIENT)
Dept: CARDIOLOGY | Age: 64
End: 2024-04-18
Payer: MEDICARE

## 2024-04-18 VITALS
OXYGEN SATURATION: 94 % | DIASTOLIC BLOOD PRESSURE: 77 MMHG | BODY MASS INDEX: 44.16 KG/M2 | HEIGHT: 62 IN | WEIGHT: 240 LBS | SYSTOLIC BLOOD PRESSURE: 134 MMHG | HEART RATE: 96 BPM | RESPIRATION RATE: 20 BRPM

## 2024-04-18 DIAGNOSIS — E78.2 MIXED HYPERLIPIDEMIA: ICD-10-CM

## 2024-04-18 DIAGNOSIS — H81.10 BENIGN PAROXYSMAL VERTIGO, UNSPECIFIED LATERALITY: ICD-10-CM

## 2024-04-18 DIAGNOSIS — R73.09 OTHER ABNORMAL GLUCOSE: ICD-10-CM

## 2024-04-18 DIAGNOSIS — R94.31 ABNORMAL EKG: ICD-10-CM

## 2024-04-18 DIAGNOSIS — R05.4 COUGH SYNCOPE: ICD-10-CM

## 2024-04-18 DIAGNOSIS — R06.02 SOB (SHORTNESS OF BREATH): Primary | ICD-10-CM

## 2024-04-18 DIAGNOSIS — R55 COUGH SYNCOPE: ICD-10-CM

## 2024-04-18 DIAGNOSIS — R00.0 TACHYCARDIA: ICD-10-CM

## 2024-04-18 DIAGNOSIS — I10 ESSENTIAL HYPERTENSION: ICD-10-CM

## 2024-04-18 DIAGNOSIS — R06.02 SOB (SHORTNESS OF BREATH): ICD-10-CM

## 2024-04-18 LAB
EST. AVERAGE GLUCOSE BLD GHB EST-MCNC: 126 MG/DL
HBA1C MFR BLD: 6 % (ref 4–6)

## 2024-04-18 PROCEDURE — 3017F COLORECTAL CA SCREEN DOC REV: CPT | Performed by: PHYSICIAN ASSISTANT

## 2024-04-18 PROCEDURE — 3078F DIAST BP <80 MM HG: CPT | Performed by: PHYSICIAN ASSISTANT

## 2024-04-18 PROCEDURE — 99204 OFFICE O/P NEW MOD 45 MIN: CPT | Performed by: PHYSICIAN ASSISTANT

## 2024-04-18 PROCEDURE — G8427 DOCREV CUR MEDS BY ELIG CLIN: HCPCS | Performed by: PHYSICIAN ASSISTANT

## 2024-04-18 PROCEDURE — 93000 ELECTROCARDIOGRAM COMPLETE: CPT | Performed by: PHYSICIAN ASSISTANT

## 2024-04-18 PROCEDURE — G8417 CALC BMI ABV UP PARAM F/U: HCPCS | Performed by: PHYSICIAN ASSISTANT

## 2024-04-18 PROCEDURE — 1036F TOBACCO NON-USER: CPT | Performed by: PHYSICIAN ASSISTANT

## 2024-04-18 PROCEDURE — 36415 COLL VENOUS BLD VENIPUNCTURE: CPT

## 2024-04-18 PROCEDURE — 83036 HEMOGLOBIN GLYCOSYLATED A1C: CPT

## 2024-04-18 PROCEDURE — 3075F SYST BP GE 130 - 139MM HG: CPT | Performed by: PHYSICIAN ASSISTANT

## 2024-04-18 RX ORDER — CARVEDILOL 3.12 MG/1
3.12 TABLET ORAL 2 TIMES DAILY
Qty: 180 TABLET | Refills: 3 | Status: CANCELLED | OUTPATIENT
Start: 2024-04-18

## 2024-04-18 RX ORDER — ATORVASTATIN CALCIUM 40 MG/1
40 TABLET, FILM COATED ORAL DAILY
Qty: 90 TABLET | Refills: 0 | Status: SHIPPED | OUTPATIENT
Start: 2024-04-18

## 2024-04-18 NOTE — PROGRESS NOTES
I, Jennifer Paz am scribing for and in the presence of Katy Medina PA-C.    Patient: Renata Hackett  : 1960  Date of Visit: 2024    REASON FOR VISIT / CONSULTATION: Establish Cardiologist (Referral for SOB and mildly abnormal Echo. Pt states she is doing well. C/o: Worsening SOB over this year. She was told by her friend that she has skipping heart beat. Lightheaded at times. She does at time pass out due to coughing spells. Yesterday was her last passing out episode. Seconds at a time. Denies: CP, Palpitations. )      History of Present Illness:        Dear Raj Salgado, APRN - CNP,     I had the pleasure of seeing Renata Hackett, who is a 63 y.o. female with a history of ***    Ms. Hackett          PAST MEDICAL HISTORY:        Past Medical History:   Diagnosis Date    Anxiety     Broken bones     Depression     Gallbladder & bile duct stone with obstruction     Hyperlipidemia     Hypertension     Numbness and tingling     generalized    Stroke (cerebrum) (HCC)        CURRENT ALLERGIES: Patient has no known allergies. REVIEW OF SYSTEMS: 14 systems were reviewed. Pertinent positives and negatives as above, all else negative.     Past Surgical History:   Procedure Laterality Date    ABDOMEN SURGERY      repair of bile duct.     CHOLECYSTECTOMY      COLONOSCOPY      ENDOSCOPY, COLON, DIAGNOSTIC      HERNIA REPAIR      HYSTERECTOMY (CERVIX STATUS UNKNOWN)      uterine prolapse, ovaries remain.    FL COLON CA SCRN NOT HI RSK IND N/A 2018    -normal    TONSILLECTOMY AND ADENOIDECTOMY      UPPER GASTROINTESTINAL ENDOSCOPY  2020    UPPER GASTROINTESTINAL ENDOSCOPY N/A 2020    EGD BIOPSY performed by Mark Dubois MD at [facility] OR    Social History:  Social History     Tobacco Use    Smoking status: Former     Current packs/day: 0.00     Average packs/day: 0.8 packs/day for 40.0 years (30.0 ttl pk-yrs)     Types: Cigarettes     Start

## 2024-04-18 NOTE — PROGRESS NOTES
I, Josselyn Cook am scribing for and in the presence of Katy Medina PA-C.    Patient: Renata Hackett  : 1960  Date of Visit: 2024    REASON FOR VISIT / CONSULTATION: Establish Cardiologist (Referral for SOB and mildly abnormal Echo. Pt states she is doing well. C/o: Worsening SOB over this year. She was told by her friend that she has skipping heart beat. Lightheaded at times. She does at time pass out due to coughing spells. Yesterday was her last passing out episode. Seconds at a time. Denies: CP, Palpitations. )      History of Present Illness:        Dear OLIVER Espinoza - CNP,     I had the pleasure of seeing Renata Hackett, who is a 63 y.o. female with a history of worsening shortness of breath.    She does have hypertension diagnosed . She was diagnosed with hyperlipidemia and was treated years ago. She also has TASHIA, has a CPAP but has not been able to use it because she does not have a mask. She did have gestational diabetes with 1 out of 6 pregnancies. She has thyroid issues. She is former smoker, for 40+ years, smoked a pack daily,  and quit 5 years ago.   She is former methamphetamine user for about 20 years ago, and quit in 2017.    She reports she had a stroke in 2018.    Echo done on 3/18/2024- EF 55-60%. Left ventricle size is normal. Normal wall thickness. Grade I diastolic dysfunction with normal LAP.  Tricuspid Valve: Mild regurgitation.     EKG done today in office 2024- Normal sinus rhythm.    Ms. Hackett is here to establish care today for shortness of breath. She has had worsening shortness of breath. She states inhalers do help with breathing, and keeping cough under control. She reports when she has bad cough she does pass out, her last episode was yesterday. She reports she has had multiple syncopal episodes when she coughs too hard. She reports it occurs several times per day. Once she starts to cough, she passes out. She was told this is due to

## 2024-04-19 ENCOUNTER — TELEPHONE (OUTPATIENT)
Dept: CARDIOLOGY | Age: 64
End: 2024-04-19

## 2024-04-19 NOTE — RESULT ENCOUNTER NOTE
Please note that this dictation was completed with [employer], the computer voice recognition software.  Quite often unanticipated grammatical, syntax, homophones, and other interpretive errors are inadvertently transcribed by the computer software.  Please disregard these errors.  Please excuse any errors that have escaped final proofreading. 79 female past medical history of hypertension seasonal allergic rhinitis asplenic dementia and was on hospice until family revoked hospice today and had patient patient brought in by EMS from home for evaluation of a large sacral wound. Home health has been managing the patient at home coming out with different periodic visits however the wound appears to have gotten worse per home health today. Her left hip and sacral wounds were photographed and pictures put into the chart. We will start a sepsis work-up due to smell image her and plan to start antibiotics Past Medical History:  
Diagnosis Date  Hypertension  Seasonal allergic rhinitis  Spleen absent Past Surgical History:  
Procedure Laterality Date  MA ABDOMEN SURGERY PROC UNLISTED    
 spleenectomy Family History:  
Problem Relation Age of Onset  Coronary Artery Disease Neg Hx Social History Socioeconomic History  Marital status:  Spouse name: Not on file  Number of children: Not on file  Years of education: Not on file  Highest education level: Not on file Occupational History  Not on file Social Needs  Financial resource strain: Not on file  Food insecurity Worry: Not on file Inability: Not on file  Transportation needs Medical: Not on file Non-medical: Not on file Tobacco Use  Smoking status: Never Smoker  Smokeless tobacco: Never Used Substance and Sexual Activity  Alcohol use: No  
  Alcohol/week: 0.0 standard drinks  Drug use: No  
 Sexual activity: Not on file Lifestyle  Physical Please notify patient that their lab results are normal. HgA1C was 6.0   Please continue current treatment and follow up. activity Days per week: Not on file Minutes per session: Not on file  Stress: Not on file Relationships  Social connections Talks on phone: Not on file Gets together: Not on file Attends [employer]ism service: Not on file Active member of club or organization: Not on file Attends meetings of clubs or organizations: Not on file Relationship status: Not on file  Intimate partner violence Fear of current or ex partner: Not on file Emotionally abused: Not on file Physically abused: Not on file Forced sexual activity: Not on file Other Topics Concern  Not on file Social History Narrative  Not on file ALLERGIES: Aspirin and Tramadol Review of Systems Unable to perform ROS: Dementia All other systems reviewed and are negative. Vitals:  
 02/27/21 1942 02/27/21 1951 BP: (!) 99/33 Pulse:  89 Temp: 97.8 °F (36.6 °C) SpO2:  97% Physical Exam 
Vitals signs and nursing note reviewed. Constitutional:   
   General: She is not in acute distress. Appearance: Normal appearance. She is well-developed. She is not ill-appearing, toxic-appearing or diaphoretic. Comments: Non-verbal at baseline / contracted/ chronic collado/ large sacral wound (stage 4); HENT:  
   Head: Normocephalic and atraumatic. Right Ear: External ear normal.  
   Left Ear: External ear normal.  
   Mouth/Throat:  
   Pharynx: No posterior oropharyngeal erythema. Eyes:  
   General: No scleral icterus. Right eye: No discharge. Left eye: No discharge. Extraocular Movements: Extraocular movements intact. Conjunctiva/sclera: Conjunctivae normal.  
   Pupils: Pupils are equal, round, and reactive to light. Neck: Musculoskeletal: Normal range of motion and neck supple. Vascular: No JVD. Trachea: No tracheal deviation. Cardiovascular:  
   Rate and Rhythm: Normal rate and regular rhythm.   
   Heart sounds: Normal heart sounds. No murmur. No friction rub. No gallop. Pulmonary:  
   Effort: Pulmonary effort is normal. No respiratory distress. Breath sounds: Normal breath sounds. No stridor. No wheezing, rhonchi or rales. Chest:  
   Chest wall: No tenderness. Abdominal:  
   General: Bowel sounds are normal.  
   Palpations: Abdomen is soft. Tenderness: There is no abdominal tenderness. There is no guarding or rebound. Genitourinary: 
   Vagina: No vaginal discharge. Comments: In diaper/ sacral wound stage 4/ chronic collado Musculoskeletal: Normal range of motion. General: No tenderness or deformity. Skin: 
   General: Skin is warm and dry. Capillary Refill: Capillary refill takes less than 2 seconds. Coloration: Skin is not jaundiced or pale. Findings: Lesion and rash present. No bruising or erythema. Neurological:  
   General: No focal deficit present. Mental Status: She is alert. Mental status is at baseline. Cranial Nerves: No cranial nerve deficit. Motor: No abnormal muscle tone. Coordination: Coordination abnormal.  
   Gait: Gait abnormal.  
   Deep Tendon Reflexes: Reflexes abnormal.  
Psychiatric:     
   Behavior: Behavior normal.     
   Thought Content: Thought content normal.  
 
  
 
MDM Number of Diagnoses or Management Options Risk of Complications, Morbidity, and/or Mortality Presenting problems: high Diagnostic procedures: moderate Management options: moderate General comments: 45 min Patient Progress Patient progress: stable Procedures Perfect Serve Consult for Admission 9:33 PM 
 
ED Room Number: XA63/42 Patient Name and age:  Cyrus Mesa 79 y.o.  female Working Diagnosis:  
1. Sacral decubitus ulcer, stage IV ([address] COVID-19 Suspicion:  no 
Sepsis present:  no  Reassessment needed: yes Code Status:  Was in hopsice but rescinded it today Readmission: no 
Isolation Requirements: no 
Recommended Level of Care:  med/surg Department:[facility] - [phone] Other:  Started Vanco/ Cefepime/ Flagyl;

## 2024-04-19 NOTE — TELEPHONE ENCOUNTER
----- Message from Katy Medina PA-C sent at 4/18/2024  9:56 PM EDT -----  Please notify patient that their lab results are normal. HgA1C was 6.0   Please continue current treatment and follow up.

## 2024-04-30 ENCOUNTER — HOSPITAL ENCOUNTER (OUTPATIENT)
Dept: PHYSICAL THERAPY | Age: 64
Setting detail: THERAPIES SERIES
Discharge: HOME | End: 2024-04-30

## 2024-04-30 NOTE — PROGRESS NOTES
Physical Therapy  [facility]  Inpatient/Observation/Outpatient Rehabilitation    Date: 2024  Patient Name: Renata Hackett       [] Inpatient Acute/Observation       [x]  Outpatient  : 1960     Plan of Care/Recert ends    [x] Pt no showed for scheduled appointment    [] Pt refused/declined therapy at this time due to:           [] Pt cancelled due to:  [] No Reason Given   [] Sick/ill   [] Other:    [] Evaluation held by RN/Provider due to:    [] High Heart Rate   [] High Blood Pressure   [] Orthopedic Consult   [] Hgb < 7   [] Other:    [] Pt does not require skilled services due to:      Therapist/Assistant will attempt to see this patient, at our earliest opportunity.       Georgina Davenport Date: 2024

## 2024-05-02 ENCOUNTER — HOSPITAL ENCOUNTER (OUTPATIENT)
Dept: PHYSICAL THERAPY | Age: 64
Setting detail: THERAPIES SERIES
Discharge: HOME | End: 2024-05-02
Payer: MEDICARE

## 2024-05-02 PROCEDURE — 97110 THERAPEUTIC EXERCISES: CPT

## 2024-05-02 PROCEDURE — 97161 PT EVAL LOW COMPLEX 20 MIN: CPT

## 2024-05-02 PROCEDURE — 97112 NEUROMUSCULAR REEDUCATION: CPT

## 2024-05-02 NOTE — PROGRESS NOTES
Phone: [phone]                       [facility]          Fax: [phone]                      Outpatient Physical Therapy                                                                      Evaluation  Date: 2024  Patient: Renata Hackett  : 1960  [facility] #: [id number]    Referring Physician: Katy Medina PA-C    Medical Diagnosis: BPPV, H81.10    Treatment Diagnosis: Dizziness  PT Insurance Information: U[employer] Medicare  Total # of Visits Approved: 8   Total # of Visits to Date: 1  No Show: 0  Canceled Appointment: 0    [x] This writer acknowledges review of patient history form     Subjective  Subjective: Patient reports dizziness and room spinning sensation for a couple years that is worse with turning head or rolling over in bed. Episodes only last a few seconds.  Additional Pertinent Hx: Hernia repair, HTN, panic attacks, anxiety, depression, emphysema, hx of stroke    Observations:   General Observations  Description: No spontaneous nystagmus, good saccades, difficulty with lateral gaze stabilization and smooth pursuits with minimal nystagmus. (-) B Katherine hallpike with no dizziness or nystagmus noted. CROM is WFL's, mild fwd head and shoulders.    Exercises:  Exercise 1: HEP: eye exercises for gaze stabilization and smooth pursuits    Functional Outcome Measures  Does looking up increase your problem?: SomeTimes  Because of your problem,Do you feel frustrated?: SomeTimes  Because of your problem, Do you restrict your travel for business or recreation?: No  Does walking down the aisle of a supermarket increase your problem?: SomeTimes  Because of your problem, Do you have difficulty getting into or out of bed?: Yes  Does your problem significantly restrict your participation in social activities such as going out to dinner, going to movies, dancing, or to parties?: SomeTimes  Because of your problem, do you have difficulty reading?: No  Does performing more ambitious activities like

## 2024-05-02 NOTE — PLAN OF CARE
Education/HEP   [x] Manual Therapy  [] Traction    [x] Neuro-eladio        [x] Soft Tissue Mobs            [x] Therapeutic Activity  [] Iontophoresis    [] Orthotic casting/fitting      [] Dry Needling  [] Blood Flow Restriction [] Vasopneumatic Compression             Electronically signed by: Evin Navarro PT, DPT    Date: 5/2/2024      ______________________________________ Date: 5/2/2024   Physician Signature

## 2024-05-03 ENCOUNTER — HOSPITAL ENCOUNTER (OUTPATIENT)
Age: 64
End: 2024-05-03
Payer: MEDICARE

## 2024-05-03 ENCOUNTER — HOSPITAL ENCOUNTER (OUTPATIENT)
Dept: NUCLEAR MEDICINE | Age: 64
Discharge: HOME | End: 2024-05-05
Payer: MEDICARE

## 2024-05-03 DIAGNOSIS — R94.31 ABNORMAL EKG: ICD-10-CM

## 2024-05-03 DIAGNOSIS — R05.4 COUGH SYNCOPE: ICD-10-CM

## 2024-05-03 DIAGNOSIS — I10 ESSENTIAL HYPERTENSION: ICD-10-CM

## 2024-05-03 DIAGNOSIS — R06.02 SOB (SHORTNESS OF BREATH): ICD-10-CM

## 2024-05-03 DIAGNOSIS — E78.2 MIXED HYPERLIPIDEMIA: ICD-10-CM

## 2024-05-03 DIAGNOSIS — R55 COUGH SYNCOPE: ICD-10-CM

## 2024-05-03 DIAGNOSIS — R00.0 TACHYCARDIA: ICD-10-CM

## 2024-05-03 LAB
STRESS BASELINE DIAS BP: 88 MMHG
STRESS BASELINE HR: 92 BPM
STRESS BASELINE SYS BP: 152 MMHG
STRESS PEAK DIAS BP: 86 MMHG
STRESS PEAK SYS BP: 168 MMHG
STRESS PERCENT HR ACHIEVED: 74 %
STRESS POST PEAK HR: 116 BPM
STRESS RATE PRESSURE PRODUCT: NORMAL BPM*MMHG
STRESS STAGE RECOVERY 1 BP: NORMAL MMHG
STRESS STAGE RECOVERY 1 DURATION: 0 MIN:SEC
STRESS STAGE RECOVERY 1 HR: 112 BPM
STRESS STAGE RECOVERY 2 DURATION: 1 MIN:SEC
STRESS STAGE RECOVERY 2 HR: 108 BPM
STRESS STAGE RECOVERY 3 BP: NORMAL MMHG
STRESS STAGE RECOVERY 3 DURATION: 3 MIN:SEC
STRESS STAGE RECOVERY 3 HR: 91 BPM
STRESS STAGE RECOVERY 4 BP: NORMAL MMHG
STRESS STAGE RECOVERY 4 DURATION: 5 MIN:SEC
STRESS STAGE RECOVERY 4 HR: 88 BPM
STRESS TARGET HR: 156 BPM

## 2024-05-03 PROCEDURE — 6360000002 HC RX W HCPCS: Performed by: FAMILY MEDICINE

## 2024-05-03 PROCEDURE — 93017 CV STRESS TEST TRACING ONLY: CPT

## 2024-05-03 PROCEDURE — A9500 TC99M SESTAMIBI: HCPCS | Performed by: PHYSICIAN ASSISTANT

## 2024-05-03 PROCEDURE — 3430000000 HC RX DIAGNOSTIC RADIOPHARMACEUTICAL: Performed by: PHYSICIAN ASSISTANT

## 2024-05-03 RX ORDER — TETRAKIS(2-METHOXYISOBUTYLISOCYANIDE)COPPER(I) TETRAFLUOROBORATE 1 MG/ML
30 INJECTION, POWDER, LYOPHILIZED, FOR SOLUTION INTRAVENOUS
Status: COMPLETED | OUTPATIENT
Start: 2024-05-03 | End: 2024-05-03

## 2024-05-03 RX ORDER — REGADENOSON 0.08 MG/ML
0.4 INJECTION, SOLUTION INTRAVENOUS
Status: COMPLETED | OUTPATIENT
Start: 2024-05-03 | End: 2024-05-03

## 2024-05-03 RX ADMIN — REGADENOSON 0.4 MG: 0.08 INJECTION, SOLUTION INTRAVENOUS at 09:01

## 2024-05-03 RX ADMIN — Medication 30 MILLICURIE: at 09:29

## 2024-05-06 ENCOUNTER — HOSPITAL ENCOUNTER (OUTPATIENT)
Dept: NUCLEAR MEDICINE | Age: 64
Discharge: HOME | End: 2024-05-08
Payer: MEDICARE

## 2024-05-06 ENCOUNTER — HOSPITAL ENCOUNTER (OUTPATIENT)
Age: 64
Discharge: HOME | End: 2024-05-08
Payer: MEDICARE

## 2024-05-06 DIAGNOSIS — E78.2 MIXED HYPERLIPIDEMIA: ICD-10-CM

## 2024-05-06 DIAGNOSIS — R55 COUGH SYNCOPE: ICD-10-CM

## 2024-05-06 DIAGNOSIS — R94.31 ABNORMAL EKG: ICD-10-CM

## 2024-05-06 DIAGNOSIS — R00.0 TACHYCARDIA: ICD-10-CM

## 2024-05-06 DIAGNOSIS — R05.4 COUGH SYNCOPE: ICD-10-CM

## 2024-05-06 DIAGNOSIS — I10 ESSENTIAL HYPERTENSION: ICD-10-CM

## 2024-05-06 DIAGNOSIS — R06.02 SOB (SHORTNESS OF BREATH): ICD-10-CM

## 2024-05-06 LAB
NUC STRESS EJECTION FRACTION: 86 %
STRESS BASELINE DIAS BP: 88 MMHG
STRESS BASELINE HR: 92 BPM
STRESS BASELINE SYS BP: 152 MMHG
STRESS PEAK DIAS BP: 86 MMHG
STRESS PEAK SYS BP: 168 MMHG
STRESS PERCENT HR ACHIEVED: 74 %
STRESS POST PEAK HR: 116 BPM
STRESS RATE PRESSURE PRODUCT: NORMAL BPM*MMHG
STRESS STAGE RECOVERY 1 BP: NORMAL MMHG
STRESS STAGE RECOVERY 1 DURATION: 0 MIN:SEC
STRESS STAGE RECOVERY 1 HR: 112 BPM
STRESS STAGE RECOVERY 2 DURATION: 1 MIN:SEC
STRESS STAGE RECOVERY 2 HR: 108 BPM
STRESS STAGE RECOVERY 3 BP: NORMAL MMHG
STRESS STAGE RECOVERY 3 DURATION: 3 MIN:SEC
STRESS STAGE RECOVERY 3 HR: 91 BPM
STRESS STAGE RECOVERY 4 BP: NORMAL MMHG
STRESS STAGE RECOVERY 4 DURATION: 5 MIN:SEC
STRESS STAGE RECOVERY 4 HR: 88 BPM
STRESS TARGET HR: 156 BPM

## 2024-05-06 PROCEDURE — 78452 HT MUSCLE IMAGE SPECT MULT: CPT | Performed by: FAMILY MEDICINE

## 2024-05-06 PROCEDURE — 93018 CV STRESS TEST I&R ONLY: CPT | Performed by: FAMILY MEDICINE

## 2024-05-06 PROCEDURE — 3430000000 HC RX DIAGNOSTIC RADIOPHARMACEUTICAL: Performed by: PHYSICIAN ASSISTANT

## 2024-05-06 PROCEDURE — 93016 CV STRESS TEST SUPVJ ONLY: CPT | Performed by: FAMILY MEDICINE

## 2024-05-06 PROCEDURE — A9500 TC99M SESTAMIBI: HCPCS | Performed by: PHYSICIAN ASSISTANT

## 2024-05-06 PROCEDURE — 93242 EXT ECG>48HR<7D RECORDING: CPT

## 2024-05-06 RX ORDER — TETRAKIS(2-METHOXYISOBUTYLISOCYANIDE)COPPER(I) TETRAFLUOROBORATE 1 MG/ML
30 INJECTION, POWDER, LYOPHILIZED, FOR SOLUTION INTRAVENOUS
Status: COMPLETED | OUTPATIENT
Start: 2024-05-06 | End: 2024-05-06

## 2024-05-06 RX ADMIN — Medication 30 MILLICURIE: at 13:10

## 2024-05-07 ENCOUNTER — TELEPHONE (OUTPATIENT)
Dept: CARDIOLOGY | Age: 64
End: 2024-05-07

## 2024-05-07 NOTE — TELEPHONE ENCOUNTER
----- Message from Katy Medina PA-C sent at 5/7/2024  8:12 AM EDT -----  Please let them know their stress test looked good, it was low risk. Will discuss at follow up. Thanks.

## 2024-05-15 ENCOUNTER — TELEPHONE (OUTPATIENT)
Dept: CARDIOLOGY | Age: 64
End: 2024-05-15

## 2024-05-15 DIAGNOSIS — R00.2 PALPITATIONS: Primary | ICD-10-CM

## 2024-05-15 NOTE — TELEPHONE ENCOUNTER
----- Message from Katy Medina PA-C sent at 5/15/2024 10:42 AM EDT -----  Please let them know that their CAM monitor was overall unremarkable. We will discuss at their follow up appointment.

## 2024-05-22 ENCOUNTER — TELEPHONE (OUTPATIENT)
Dept: CARDIOLOGY | Age: 64
End: 2024-05-22

## 2024-05-22 ENCOUNTER — HOSPITAL ENCOUNTER (OUTPATIENT)
Age: 64
Discharge: HOME | End: 2024-05-24
Payer: MEDICARE

## 2024-05-22 DIAGNOSIS — R00.2 PALPITATIONS: ICD-10-CM

## 2024-05-22 PROCEDURE — 93243 EXT ECG>48HR<7D SCAN A/R: CPT

## 2024-05-22 NOTE — TELEPHONE ENCOUNTER
Patients cam monitor will not stick. This is the second one she has had put on and it was placed today at 11:00 and is already starting to fall off. I did advise her to go back to cardiopulmonary and see if they can add tape but she states they want to know if you want to order a different kind of monitor for her since it wont stick?

## 2024-05-28 DIAGNOSIS — I10 ESSENTIAL HYPERTENSION: ICD-10-CM

## 2024-05-28 RX ORDER — FLUTICASONE FUROATE, UMECLIDINIUM BROMIDE AND VILANTEROL TRIFENATATE 100; 62.5; 25 UG/1; UG/1; UG/1
POWDER RESPIRATORY (INHALATION)
COMMUNITY
Start: 2024-04-29

## 2024-05-28 RX ORDER — DILTIAZEM HYDROCHLORIDE 180 MG/1
180 CAPSULE, EXTENDED RELEASE ORAL DAILY
Qty: 90 CAPSULE | Refills: 3 | Status: SHIPPED | OUTPATIENT
Start: 2024-05-28

## 2024-05-29 DIAGNOSIS — F33.1 MODERATE EPISODE OF RECURRENT MAJOR DEPRESSIVE DISORDER (HCC): ICD-10-CM

## 2024-05-29 RX ORDER — ARIPIPRAZOLE 5 MG/1
5 TABLET ORAL DAILY
Qty: 90 TABLET | Refills: 1 | Status: SHIPPED | OUTPATIENT
Start: 2024-05-29

## 2024-05-29 NOTE — TELEPHONE ENCOUNTER
Health Maintenance   Topic Date Due    Hepatitis C screen  09/07/2024 (Originally 7[birth year]12/1977)    HIV screen  09/07/2024 (Originally 7/12/1974)    Pneumococcal 0-64 years Vaccine (2 of 2 - PCV) 09/19/2024 (Originally 6/4/2020)    Shingles vaccine (1 of 2) 11/07/2024 (Originally 7/12/2009)    COVID-19 Vaccine (3 - 2023-24 season) 11/07/2024 (Originally 9/1/2023)    Respiratory Syncytial Virus (RSV) Pregnant or age 60 yrs+ (1 - 1-dose 60+ series) 12/13/2024 (Originally 7/12/2019)    DTaP/Tdap/Td vaccine (1 - Tdap) 03/14/2025 (Originally 7/12/1978)    Lipids  02/22/2025    Depression Monitoring  04/09/2025    A1C test (Diabetic or Prediabetic)  04/18/2025    Breast cancer screen  04/08/2026    Colorectal Cancer Screen  09/25/2028    Annual Wellness Visit (Medicare Advantage)  Completed    Flu vaccine  Completed    Hepatitis A vaccine  Aged Out    Hepatitis B vaccine  Aged Out    Hib vaccine  Aged Out    Polio vaccine  Aged Out    Meningococcal (ACWY) vaccine  Aged Out    Diabetes screen  Discontinued    Low dose CT lung screening &/or counseling  Discontinued    Cervical cancer screen  Discontinued             (applicable per patient's age: Cancer Screenings, Depression Screening, Fall Risk Screening, Immunizations)    Hemoglobin A1C (%)   Date Value   04/18/2024 6.0   03/29/2022 5.6   12/10/2020 5.3     AST (U/L)   Date Value   02/22/2024 19     ALT (U/L)   Date Value   02/22/2024 24     BUN (mg/dL)   Date Value   02/22/2024 14      (goal A1C is < 7)   (goal LDL is <100) need 30-50% reduction from baseline     BP Readings from Last 3 Encounters:   04/18/24 134/77   04/09/24 132/80   04/01/24 138/82    (goal /80)      All Future Testing planned in [facility]:  Lab Frequency Next Occurrence   TSH Once 09/10/2024   T4, Free Once 09/10/2024       Next Visit Date:  Future Appointments   Date Time Provider Department Center   7/25/2024 11:20 AM Vaibhav Camarena MD [facility]   8/15/2024 11:30 AM Joe Carter MD TIFF

## 2024-06-03 ENCOUNTER — TELEPHONE (OUTPATIENT)
Dept: CARDIOLOGY | Age: 64
End: 2024-06-03

## 2024-06-03 RX ORDER — METOPROLOL SUCCINATE 50 MG/1
50 TABLET, EXTENDED RELEASE ORAL DAILY
Qty: 30 TABLET | Refills: 1 | Status: SHIPPED | OUTPATIENT
Start: 2024-06-03

## 2024-06-03 NOTE — TELEPHONE ENCOUNTER
----- Message from Katy Medina PA-C sent at 6/3/2024  9:59 AM EDT -----  Please let them know that their CAM monitor was abnormal. Tachycardic (faster than 100 bpm) 66% of the study, her average heart rate was 107 bpm. She is on diltiazem which should help with her heart rate but I would like to start Toprol XL 50 mg daily as well. Most common side effect is lightheaded/dizziness. Please let me know if she is agreeable and where she would like it sent.  We will discuss at their follow up appointment, can we schedule in a couple weeks to see how she is doing on the new medication. Thanks.

## 2024-06-18 ENCOUNTER — TELEPHONE (OUTPATIENT)
Dept: PRIMARY CARE CLINIC | Age: 64
End: 2024-06-18

## 2024-06-18 ENCOUNTER — TELEPHONE (OUTPATIENT)
Dept: PULMONOLOGY | Age: 64
End: 2024-06-18

## 2024-06-18 NOTE — TELEPHONE ENCOUNTER
Rx for mouthwash sent.  Please have her discuss this with her pulmonology provider as she may need to switch inhalers.  Thank you.

## 2024-06-18 NOTE — TELEPHONE ENCOUNTER
Patient called and left voice mail box message that the Trelegy is causing thrush.  PCP is treating the thrush, please advise the alternative therapy for the patient.

## 2024-06-18 NOTE — TELEPHONE ENCOUNTER
Patient contacted the office in regards to having thrush mouth again due to her inhaler. Patient informed writer she continuously rinses her mouth after using the inhaler. Patient was wondering if she could get the mouthwash sent to the pharmacy again.     Please advise.

## 2024-06-19 RX ORDER — BUDESONIDE AND FORMOTEROL FUMARATE DIHYDRATE 160; 4.5 UG/1; UG/1
2 AEROSOL RESPIRATORY (INHALATION) 2 TIMES DAILY
Qty: 30.6 G | Refills: 3 | Status: SHIPPED | OUTPATIENT
Start: 2024-06-19

## 2024-06-19 NOTE — TELEPHONE ENCOUNTER
Spoke to Renata.   She stated this is second bout of the thrush since taking the Trelegy.   Reports when was on the Symbicort, she never had an issue.   She is rinsing well after using Trelegy.  Currently she is doing the prescribed medication for thrush from PCP.   She is open to either switching back to Symbicort or trying the Anoro or any other recommendations from [facility].  She states \"just don't want to keep going through this\"  Please advise.

## 2024-06-19 NOTE — TELEPHONE ENCOUNTER
I sent Symbicort and Spiriva to her pharmacy.  Patient will need to resume inhaler therapy, I will discontinue Trelegy Ellipta.

## 2024-06-21 DIAGNOSIS — I10 PRIMARY HYPERTENSION: ICD-10-CM

## 2024-06-21 RX ORDER — LOSARTAN POTASSIUM AND HYDROCHLOROTHIAZIDE 12.5; 5 MG/1; MG/1
1 TABLET ORAL DAILY
Qty: 90 TABLET | Refills: 0 | Status: SHIPPED | OUTPATIENT
Start: 2024-06-21

## 2024-06-21 NOTE — TELEPHONE ENCOUNTER
Health Maintenance   Topic Date Due    Hepatitis C screen  09/07/2024 (Originally 7/12/1977)    HIV screen  09/07/2024 (Originally 7/12/1974)    Pneumococcal 0-64 years Vaccine (2 of 2 - PCV) 09/19/2024 (Originally 6/4/2020)    Shingles vaccine (1 of 2) 11/07/2024 (Originally 7/12/2009)    COVID-19 Vaccine (3 - 2023-24 season) 11/07/2024 (Originally 9/1/2023)    Respiratory Syncytial Virus (RSV) Pregnant or age 60 yrs+ (1 - 1-dose 60+ series) 12/13/2024 (Originally 7/12/2019)    DTaP/Tdap/Td vaccine (1 - Tdap) 03/14/2025 (Originally 7/12/1978)    Lipids  02/22/2025    Depression Monitoring  04/09/2025    A1C test (Diabetic or Prediabetic)  04/18/2025    Breast cancer screen  04/08/2026    Colorectal Cancer Screen  09/25/2028    Annual Wellness Visit (Medicare Advantage)  Completed    Flu vaccine  Completed    Hepatitis A vaccine  Aged Out    Hepatitis B vaccine  Aged Out    Hib vaccine  Aged Out    Polio vaccine  Aged Out    Meningococcal (ACWY) vaccine  Aged Out    Diabetes screen  Discontinued    Low dose CT lung screening &/or counseling  Discontinued    Cervical cancer screen  Discontinued             (applicable per patient's age: Cancer Screenings, Depression Screening, Fall Risk Screening, Immunizations)    Hemoglobin A1C (%)   Date Value   04/18/2024 6.0   03/29/2022 5.6   12/10/2020 5.3     AST (U/L)   Date Value   02/22/2024 19     ALT (U/L)   Date Value   02/22/2024 24     BUN (mg/dL)   Date Value   02/22/2024 14      (goal A1C is < 7)   (goal LDL is <100) need 30-50% reduction from baseline     BP Readings from Last 3 Encounters:   04/18/24 134/77   04/09/24 132/80   04/01/24 138/82    (goal /80)      All Future Testing planned in [facility]:  Lab Frequency Next Occurrence   TSH Once 09/10/2024   T4, Free Once 09/10/2024       Next Visit Date:  Future Appointments   Date Time Provider Department Center   6/24/2024  8:40 AM Vaibhav Camarena MD [facility]   7/25/2024 11:20 AM Vaibhav Camarena MD

## 2024-06-21 NOTE — TELEPHONE ENCOUNTER
Contacted patient, notified that the Trelegy Elipta was discontinued and Rx for Spiriva and Symbicort were sent to pharmacy.  Verbalized understanding.   Explained need for good oral hygiene with the Symbicort as well.

## 2024-06-24 ENCOUNTER — OFFICE VISIT (OUTPATIENT)
Dept: CARDIOLOGY | Age: 64
End: 2024-06-24
Payer: MEDICARE

## 2024-06-24 VITALS
HEIGHT: 62 IN | DIASTOLIC BLOOD PRESSURE: 67 MMHG | HEART RATE: 76 BPM | BODY MASS INDEX: 44.64 KG/M2 | WEIGHT: 242.6 LBS | SYSTOLIC BLOOD PRESSURE: 106 MMHG | RESPIRATION RATE: 18 BRPM

## 2024-06-24 DIAGNOSIS — R05.4 COUGH SYNCOPE: ICD-10-CM

## 2024-06-24 DIAGNOSIS — I10 ESSENTIAL HYPERTENSION: ICD-10-CM

## 2024-06-24 DIAGNOSIS — R00.0 TACHYCARDIA: ICD-10-CM

## 2024-06-24 DIAGNOSIS — E78.2 MIXED HYPERLIPIDEMIA: ICD-10-CM

## 2024-06-24 DIAGNOSIS — R94.31 ABNORMAL EKG: ICD-10-CM

## 2024-06-24 DIAGNOSIS — R06.02 SOB (SHORTNESS OF BREATH): Primary | ICD-10-CM

## 2024-06-24 DIAGNOSIS — R55 COUGH SYNCOPE: ICD-10-CM

## 2024-06-24 PROCEDURE — 99214 OFFICE O/P EST MOD 30 MIN: CPT | Performed by: INTERNAL MEDICINE

## 2024-06-24 PROCEDURE — G8417 CALC BMI ABV UP PARAM F/U: HCPCS | Performed by: INTERNAL MEDICINE

## 2024-06-24 PROCEDURE — 1036F TOBACCO NON-USER: CPT | Performed by: INTERNAL MEDICINE

## 2024-06-24 PROCEDURE — 3074F SYST BP LT 130 MM HG: CPT | Performed by: INTERNAL MEDICINE

## 2024-06-24 PROCEDURE — 3017F COLORECTAL CA SCREEN DOC REV: CPT | Performed by: INTERNAL MEDICINE

## 2024-06-24 PROCEDURE — 3078F DIAST BP <80 MM HG: CPT | Performed by: INTERNAL MEDICINE

## 2024-06-24 PROCEDURE — G8427 DOCREV CUR MEDS BY ELIG CLIN: HCPCS | Performed by: INTERNAL MEDICINE

## 2024-06-24 RX ORDER — METOPROLOL SUCCINATE 50 MG/1
50 TABLET, EXTENDED RELEASE ORAL DAILY
Qty: 90 TABLET | Refills: 3 | Status: SHIPPED | OUTPATIENT
Start: 2024-06-24

## 2024-06-24 NOTE — PROGRESS NOTES
tablet Take 1 tablet by mouth daily 30 tablet 1    ARIPiprazole (ABILIFY) 5 MG tablet Take 1 tablet by mouth daily 90 tablet 1    dilTIAZem (TIAZAC) 180 MG extended release capsule TAKE 1 CAPSULE BY MOUTH DAILY 90 capsule 3    atorvastatin (LIPITOR) 40 MG tablet Take 1 tablet by mouth daily 90 tablet 0    albuterol (PROVENTIL) (2.5 MG/3ML) 0.083% nebulizer solution USE THREE MILLILITERS VIA NEBULIZATION BY MOUTH EVERY 4 HOURS AS NEEDED FOR WHEEZING OR FOR SHORTNESS OF BREATH 120 each 1    DULoxetine (CYMBALTA) 30 MG extended release capsule Take 1 capsule by mouth daily 90 capsule 1    levothyroxine (SYNTHROID) 25 MCG tablet Take 1 tablet by mouth daily 90 tablet 1    albuterol sulfate HFA (PROVENTIL;VENTOLIN;PROAIR) 108 (90 Base) MCG/ACT inhaler Inhale 2 puffs into the lungs every 6 hours as needed for Wheezing or Shortness of Breath 18 g 11    omeprazole (PRILOSEC) 20 MG delayed release capsule TAKE ONE CAPSULE BY MOUTH DAILY 90 capsule 3    Respiratory Therapy Supplies (NEBULIZER/TUBING/MOUTHPIECE) KIT 1 kit by Does not apply route daily as needed (wheezing, sob) 1 kit 0       FAMILY HISTORY: family history includes Asthma in her mother; Cancer in her mother; Depression in her mother; Hearing Loss in her mother; High Blood Pressure in her mother; High Cholesterol in her mother; Osteoporosis in her mother; Stroke in her mother. She was adopted.     Physical Examination:     /67 (Site: Left Upper Arm, Position: Sitting, Cuff Size: Large Adult)   Pulse 76   Resp 18   Ht 1.575 m (5' 2\")   Wt 110 kg (242 lb 9.6 oz)   BMI 44.37 kg/m²  Body mass index is 44.37 kg/m².    Constitutional: She is oriented to person, place, and time. She appears well-developed and well-nourished. In no acute distress.   HEENT: Normocephalic and atraumatic.No JVD present. Carotid bruit is not present. No mass and no thyromegaly present. No lymphadenopathy present.  Cardiovascular: Normal rate, regular rhythm, normal heart sounds.

## 2024-07-14 DIAGNOSIS — R05.4 COUGH SYNCOPE: ICD-10-CM

## 2024-07-14 DIAGNOSIS — R55 COUGH SYNCOPE: ICD-10-CM

## 2024-07-14 DIAGNOSIS — R94.31 ABNORMAL EKG: ICD-10-CM

## 2024-07-14 DIAGNOSIS — E78.2 MIXED HYPERLIPIDEMIA: ICD-10-CM

## 2024-07-14 DIAGNOSIS — R00.0 TACHYCARDIA: ICD-10-CM

## 2024-07-14 DIAGNOSIS — R06.02 SOB (SHORTNESS OF BREATH): ICD-10-CM

## 2024-07-14 DIAGNOSIS — I10 ESSENTIAL HYPERTENSION: ICD-10-CM

## 2024-07-15 RX ORDER — ATORVASTATIN CALCIUM 40 MG/1
40 TABLET, FILM COATED ORAL DAILY
Qty: 90 TABLET | Refills: 3 | Status: SHIPPED | OUTPATIENT
Start: 2024-07-15

## 2024-08-15 ENCOUNTER — OFFICE VISIT (OUTPATIENT)
Dept: PULMONOLOGY | Age: 64
End: 2024-08-15
Payer: MEDICARE

## 2024-08-15 VITALS
HEIGHT: 62 IN | RESPIRATION RATE: 18 BRPM | OXYGEN SATURATION: 95 % | TEMPERATURE: 97.8 F | BODY MASS INDEX: 43.63 KG/M2 | SYSTOLIC BLOOD PRESSURE: 147 MMHG | HEART RATE: 82 BPM | DIASTOLIC BLOOD PRESSURE: 78 MMHG | WEIGHT: 237.1 LBS

## 2024-08-15 DIAGNOSIS — Z87.891 PERSONAL HISTORY OF TOBACCO USE: ICD-10-CM

## 2024-08-15 DIAGNOSIS — E66.01 OBESITY, CLASS III, BMI 40-49.9 (MORBID OBESITY) (HCC): ICD-10-CM

## 2024-08-15 DIAGNOSIS — G47.33 OSA (OBSTRUCTIVE SLEEP APNEA): Primary | ICD-10-CM

## 2024-08-15 DIAGNOSIS — J45.40 MODERATE PERSISTENT ASTHMA WITHOUT COMPLICATION: ICD-10-CM

## 2024-08-15 DIAGNOSIS — R05.3 CHRONIC COUGH: ICD-10-CM

## 2024-08-15 PROCEDURE — G8427 DOCREV CUR MEDS BY ELIG CLIN: HCPCS | Performed by: INTERNAL MEDICINE

## 2024-08-15 PROCEDURE — 3017F COLORECTAL CA SCREEN DOC REV: CPT | Performed by: INTERNAL MEDICINE

## 2024-08-15 PROCEDURE — 1123F ACP DISCUSS/DSCN MKR DOCD: CPT | Performed by: INTERNAL MEDICINE

## 2024-08-15 PROCEDURE — G8417 CALC BMI ABV UP PARAM F/U: HCPCS | Performed by: INTERNAL MEDICINE

## 2024-08-15 PROCEDURE — 3078F DIAST BP <80 MM HG: CPT | Performed by: INTERNAL MEDICINE

## 2024-08-15 PROCEDURE — G8400 PT W/DXA NO RESULTS DOC: HCPCS | Performed by: INTERNAL MEDICINE

## 2024-08-15 PROCEDURE — 3077F SYST BP >= 140 MM HG: CPT | Performed by: INTERNAL MEDICINE

## 2024-08-15 PROCEDURE — 99214 OFFICE O/P EST MOD 30 MIN: CPT | Performed by: INTERNAL MEDICINE

## 2024-08-15 PROCEDURE — 1036F TOBACCO NON-USER: CPT | Performed by: INTERNAL MEDICINE

## 2024-08-15 PROCEDURE — 1090F PRES/ABSN URINE INCON ASSESS: CPT | Performed by: INTERNAL MEDICINE

## 2024-08-15 NOTE — PATIENT INSTRUCTIONS
SURVEY:    Thank you for allowing us to care for you today.    You may be receiving a survey from [facility] regarding your visit today- electronically or via mail.      Please help us by completing the survey as this will provide the needed feedback to ensure we are providing the very best care for you and your family.    If you cannot score us a very good on any question, please call the office to discuss how we could have made your experience a very good one.    Thank you.       STAFF:    Nicole Kirk, Mariah Araujo, Paola Escobar      CLINICAL STAFF:    Misty Schumacher LPN, Lima Ayala LPN, Karen Izaguirre LPN, Franca Miller CMA

## 2024-08-15 NOTE — PROGRESS NOTES
discussed  Follow good sleep hygeine instructions  Given sleep hygeine instructions  Compliance data reviewed and suboptimal compliance discussed with her advised compliance with CPAP    Vaccinations recommended for flu in fall  She had flu vaccine last year  She had COVID vaccination recommended booster  Up to date with vaccinations from pulm perspective   Maintain an active lifestyle   PFT's reviewed from June 2020.  She will need low-dose screening CT scan yearly and the next one will be third week of March 2025    Questions answered pertaining to diagnosis and management explained importance of compliance with therapy     Return to clinic in 6 months.    It was my pleasure to evaluate Renata Hackett today.  Please call with questions.    Please note that this chart was generated using voice recognition Dragon dictation software. Although every effort was made to ensure the accuracy of this automated transcription, some errors in transcription may have occurred.    Joe Carter MD, MD             8/15/2024, 1:08 PM

## 2024-09-10 ENCOUNTER — HOSPITAL ENCOUNTER (OUTPATIENT)
Age: 64
Discharge: HOME | End: 2024-09-10
Payer: MEDICARE

## 2024-09-10 DIAGNOSIS — E03.8 SUBCLINICAL HYPOTHYROIDISM: ICD-10-CM

## 2024-09-10 LAB — TSH SERPL DL<=0.05 MIU/L-ACNC: 2.05 UIU/ML (ref 0.27–4.2)

## 2024-09-10 PROCEDURE — 84443 ASSAY THYROID STIM HORMONE: CPT

## 2024-09-10 PROCEDURE — 84439 ASSAY OF FREE THYROXINE: CPT

## 2024-09-10 PROCEDURE — 36415 COLL VENOUS BLD VENIPUNCTURE: CPT

## 2024-09-11 LAB — T4 FREE SERPL-MCNC: 1 NG/DL (ref 0.92–1.68)

## 2024-09-12 DIAGNOSIS — R13.10 DYSPHAGIA, UNSPECIFIED TYPE: ICD-10-CM

## 2024-09-12 DIAGNOSIS — E03.8 SUBCLINICAL HYPOTHYROIDISM: ICD-10-CM

## 2024-09-12 RX ORDER — LEVOTHYROXINE SODIUM 25 UG/1
25 TABLET ORAL DAILY
Qty: 90 TABLET | Refills: 1 | Status: SHIPPED | OUTPATIENT
Start: 2024-09-12

## 2024-09-18 ENCOUNTER — OFFICE VISIT (OUTPATIENT)
Dept: PRIMARY CARE CLINIC | Age: 64
End: 2024-09-18
Payer: MEDICARE

## 2024-09-18 VITALS
WEIGHT: 237.5 LBS | HEART RATE: 95 BPM | DIASTOLIC BLOOD PRESSURE: 74 MMHG | TEMPERATURE: 98.5 F | SYSTOLIC BLOOD PRESSURE: 126 MMHG | BODY MASS INDEX: 43.44 KG/M2 | OXYGEN SATURATION: 95 %

## 2024-09-18 DIAGNOSIS — I73.9 INTERMITTENT CLAUDICATION (HCC): ICD-10-CM

## 2024-09-18 DIAGNOSIS — Z23 NEED FOR INFLUENZA VACCINATION: ICD-10-CM

## 2024-09-18 DIAGNOSIS — F33.1 MODERATE EPISODE OF RECURRENT MAJOR DEPRESSIVE DISORDER (HCC): ICD-10-CM

## 2024-09-18 DIAGNOSIS — E03.8 SUBCLINICAL HYPOTHYROIDISM: Primary | ICD-10-CM

## 2024-09-18 PROCEDURE — 3078F DIAST BP <80 MM HG: CPT | Performed by: NURSE PRACTITIONER

## 2024-09-18 PROCEDURE — 3017F COLORECTAL CA SCREEN DOC REV: CPT | Performed by: NURSE PRACTITIONER

## 2024-09-18 PROCEDURE — 3074F SYST BP LT 130 MM HG: CPT | Performed by: NURSE PRACTITIONER

## 2024-09-18 PROCEDURE — G8417 CALC BMI ABV UP PARAM F/U: HCPCS | Performed by: NURSE PRACTITIONER

## 2024-09-18 PROCEDURE — 99214 OFFICE O/P EST MOD 30 MIN: CPT | Performed by: NURSE PRACTITIONER

## 2024-09-18 PROCEDURE — 90653 IIV ADJUVANT VACCINE IM: CPT | Performed by: NURSE PRACTITIONER

## 2024-09-18 PROCEDURE — G8427 DOCREV CUR MEDS BY ELIG CLIN: HCPCS | Performed by: NURSE PRACTITIONER

## 2024-09-18 PROCEDURE — 1123F ACP DISCUSS/DSCN MKR DOCD: CPT | Performed by: NURSE PRACTITIONER

## 2024-09-18 PROCEDURE — 1036F TOBACCO NON-USER: CPT | Performed by: NURSE PRACTITIONER

## 2024-09-18 PROCEDURE — G8400 PT W/DXA NO RESULTS DOC: HCPCS | Performed by: NURSE PRACTITIONER

## 2024-09-18 PROCEDURE — 1090F PRES/ABSN URINE INCON ASSESS: CPT | Performed by: NURSE PRACTITIONER

## 2024-09-18 PROCEDURE — G0008 ADMIN INFLUENZA VIRUS VAC: HCPCS | Performed by: NURSE PRACTITIONER

## 2024-09-18 RX ORDER — ARIPIPRAZOLE 10 MG/1
10 TABLET ORAL DAILY
Qty: 90 TABLET | Refills: 3 | Status: SHIPPED | OUTPATIENT
Start: 2024-09-18

## 2024-09-19 DIAGNOSIS — F34.1 DYSTHYMIA: ICD-10-CM

## 2024-09-19 RX ORDER — DULOXETIN HYDROCHLORIDE 30 MG/1
30 CAPSULE, DELAYED RELEASE ORAL DAILY
Qty: 90 CAPSULE | Refills: 1 | Status: SHIPPED | OUTPATIENT
Start: 2024-09-19

## 2024-09-19 ASSESSMENT — ENCOUNTER SYMPTOMS
NAUSEA: 0
HOARSE VOICE: 0
SORE THROAT: 0
ABDOMINAL PAIN: 0
VISUAL CHANGE: 0
COUGH: 0
DIARRHEA: 0
SHORTNESS OF BREATH: 0
VOMITING: 0
WHEEZING: 0
CONSTIPATION: 0
RHINORRHEA: 0

## 2024-09-26 DIAGNOSIS — I10 PRIMARY HYPERTENSION: ICD-10-CM

## 2024-09-26 RX ORDER — LOSARTAN POTASSIUM AND HYDROCHLOROTHIAZIDE 12.5; 5 MG/1; MG/1
1 TABLET ORAL DAILY
Qty: 90 TABLET | Refills: 3 | Status: SHIPPED | OUTPATIENT
Start: 2024-09-26

## 2024-10-14 ENCOUNTER — HOSPITAL ENCOUNTER (OUTPATIENT)
Dept: VASCULAR LAB | Age: 64
Discharge: HOME | End: 2024-10-16
Attending: INTERNAL MEDICINE
Payer: MEDICARE

## 2024-10-14 ENCOUNTER — HOSPITAL ENCOUNTER (OUTPATIENT)
Dept: PULMONOLOGY | Age: 64
Discharge: HOME | End: 2024-10-14
Attending: INTERNAL MEDICINE
Payer: MEDICARE

## 2024-10-14 DIAGNOSIS — I73.9 INTERMITTENT CLAUDICATION (HCC): ICD-10-CM

## 2024-10-14 DIAGNOSIS — J45.40 MODERATE PERSISTENT ASTHMA WITHOUT COMPLICATION: ICD-10-CM

## 2024-10-14 PROCEDURE — 94664 DEMO&/EVAL PT USE INHALER: CPT

## 2024-10-14 PROCEDURE — 94729 DIFFUSING CAPACITY: CPT

## 2024-10-14 PROCEDURE — 6370000000 HC RX 637 (ALT 250 FOR IP): Performed by: INTERNAL MEDICINE

## 2024-10-14 PROCEDURE — 94060 EVALUATION OF WHEEZING: CPT

## 2024-10-14 PROCEDURE — 93923 UPR/LXTR ART STDY 3+ LVLS: CPT

## 2024-10-14 PROCEDURE — 94726 PLETHYSMOGRAPHY LUNG VOLUMES: CPT

## 2024-10-14 RX ORDER — ALBUTEROL SULFATE 90 UG/1
4 INHALANT RESPIRATORY (INHALATION) ONCE
Status: COMPLETED | OUTPATIENT
Start: 2024-10-14 | End: 2024-10-14

## 2024-10-14 RX ADMIN — ALBUTEROL SULFATE 4 PUFF: 90 AEROSOL, METERED RESPIRATORY (INHALATION) at 13:33

## 2024-10-17 ENCOUNTER — TELEPHONE (OUTPATIENT)
Dept: PRIMARY CARE CLINIC | Age: 64
End: 2024-10-17

## 2024-10-17 DIAGNOSIS — I73.9 PAD (PERIPHERAL ARTERY DISEASE) (HCC): Primary | ICD-10-CM

## 2024-10-17 LAB
VAS LEFT ABI: 1.15
VAS LEFT ARM BP: 130 MMHG
VAS LEFT DORSALIS PEDIS BP: 149 MMHG
VAS LEFT PTA BP: 148 MMHG
VAS LEFT TBI: 0.79
VAS LEFT TOE PRESSURE: 103 MMHG
VAS RIGHT ARM BP: 126 MMHG
VAS RIGHT DORSALIS PEDIS BP: 162 MMHG
VAS RIGHT PTA BP: 254 MMHG
VAS RIGHT TBI: 0.85
VAS RIGHT TOE PRESSURE: 111 MMHG

## 2024-10-17 RX ORDER — TIOTROPIUM BROMIDE INHALATION SPRAY 3.12 UG/1
SPRAY, METERED RESPIRATORY (INHALATION)
Qty: 4 G | Refills: 5 | Status: SHIPPED | OUTPATIENT
Start: 2024-10-17

## 2024-10-17 NOTE — TELEPHONE ENCOUNTER
----- Message from OLIVER Cobian CNP sent at 10/17/2024  2:49 PM EDT -----  She does have some peripheral artery disease likely related to her smoking.  I am referring her to vascular for evaluation.  Thank you.

## 2024-11-05 ENCOUNTER — OFFICE VISIT (OUTPATIENT)
Dept: PRIMARY CARE CLINIC | Age: 64
End: 2024-11-05
Payer: MEDICARE

## 2024-11-05 VITALS
TEMPERATURE: 98.7 F | HEIGHT: 62 IN | RESPIRATION RATE: 16 BRPM | HEART RATE: 100 BPM | SYSTOLIC BLOOD PRESSURE: 110 MMHG | DIASTOLIC BLOOD PRESSURE: 72 MMHG | BODY MASS INDEX: 44.05 KG/M2 | WEIGHT: 239.4 LBS | OXYGEN SATURATION: 95 %

## 2024-11-05 DIAGNOSIS — J44.1 COPD EXACERBATION (HCC): Primary | ICD-10-CM

## 2024-11-05 PROCEDURE — 3078F DIAST BP <80 MM HG: CPT | Performed by: NURSE PRACTITIONER

## 2024-11-05 PROCEDURE — 3017F COLORECTAL CA SCREEN DOC REV: CPT | Performed by: NURSE PRACTITIONER

## 2024-11-05 PROCEDURE — 1123F ACP DISCUSS/DSCN MKR DOCD: CPT | Performed by: NURSE PRACTITIONER

## 2024-11-05 PROCEDURE — G8427 DOCREV CUR MEDS BY ELIG CLIN: HCPCS | Performed by: NURSE PRACTITIONER

## 2024-11-05 PROCEDURE — G8417 CALC BMI ABV UP PARAM F/U: HCPCS | Performed by: NURSE PRACTITIONER

## 2024-11-05 PROCEDURE — 99214 OFFICE O/P EST MOD 30 MIN: CPT | Performed by: NURSE PRACTITIONER

## 2024-11-05 PROCEDURE — 3023F SPIROM DOC REV: CPT | Performed by: NURSE PRACTITIONER

## 2024-11-05 PROCEDURE — G8482 FLU IMMUNIZE ORDER/ADMIN: HCPCS | Performed by: NURSE PRACTITIONER

## 2024-11-05 PROCEDURE — G8400 PT W/DXA NO RESULTS DOC: HCPCS | Performed by: NURSE PRACTITIONER

## 2024-11-05 PROCEDURE — 3074F SYST BP LT 130 MM HG: CPT | Performed by: NURSE PRACTITIONER

## 2024-11-05 PROCEDURE — 1036F TOBACCO NON-USER: CPT | Performed by: NURSE PRACTITIONER

## 2024-11-05 PROCEDURE — 1090F PRES/ABSN URINE INCON ASSESS: CPT | Performed by: NURSE PRACTITIONER

## 2024-11-05 RX ORDER — BENZONATATE 100 MG/1
100-200 CAPSULE ORAL 3 TIMES DAILY PRN
Qty: 60 CAPSULE | Refills: 0 | Status: SHIPPED | OUTPATIENT
Start: 2024-11-05 | End: 2024-11-12

## 2024-11-05 RX ORDER — GUAIFENESIN 600 MG/1
600 TABLET, EXTENDED RELEASE ORAL 2 TIMES DAILY
Qty: 30 TABLET | Refills: 0 | Status: SHIPPED | OUTPATIENT
Start: 2024-11-05 | End: 2024-11-20

## 2024-11-05 RX ORDER — PREDNISONE 20 MG/1
20 TABLET ORAL 2 TIMES DAILY
Qty: 10 TABLET | Refills: 0 | Status: SHIPPED | OUTPATIENT
Start: 2024-11-05 | End: 2024-11-10

## 2024-11-05 RX ORDER — AZITHROMYCIN 250 MG/1
TABLET, FILM COATED ORAL
Qty: 6 TABLET | Refills: 0 | Status: SHIPPED | OUTPATIENT
Start: 2024-11-05 | End: 2024-11-15

## 2024-11-05 ASSESSMENT — ENCOUNTER SYMPTOMS
SHORTNESS OF BREATH: 1
EYES NEGATIVE: 1
COUGH: 1
CHEST TIGHTNESS: 1
WHEEZING: 1
SORE THROAT: 0
RHINORRHEA: 1
GASTROINTESTINAL NEGATIVE: 1
ALLERGIC/IMMUNOLOGIC NEGATIVE: 1

## 2024-11-05 ASSESSMENT — PATIENT HEALTH QUESTIONNAIRE - PHQ9
5. POOR APPETITE OR OVEREATING: NOT AT ALL
9. THOUGHTS THAT YOU WOULD BE BETTER OFF DEAD, OR OF HURTING YOURSELF: NOT AT ALL
SUM OF ALL RESPONSES TO PHQ QUESTIONS 1-9: 0
6. FEELING BAD ABOUT YOURSELF - OR THAT YOU ARE A FAILURE OR HAVE LET YOURSELF OR YOUR FAMILY DOWN: NOT AT ALL
4. FEELING TIRED OR HAVING LITTLE ENERGY: NOT AT ALL
10. IF YOU CHECKED OFF ANY PROBLEMS, HOW DIFFICULT HAVE THESE PROBLEMS MADE IT FOR YOU TO DO YOUR WORK, TAKE CARE OF THINGS AT HOME, OR GET ALONG WITH OTHER PEOPLE: NOT DIFFICULT AT ALL
7. TROUBLE CONCENTRATING ON THINGS, SUCH AS READING THE NEWSPAPER OR WATCHING TELEVISION: NOT AT ALL
1. LITTLE INTEREST OR PLEASURE IN DOING THINGS: NOT AT ALL
8. MOVING OR SPEAKING SO SLOWLY THAT OTHER PEOPLE COULD HAVE NOTICED. OR THE OPPOSITE, BEING SO FIGETY OR RESTLESS THAT YOU HAVE BEEN MOVING AROUND A LOT MORE THAN USUAL: NOT AT ALL
SUM OF ALL RESPONSES TO PHQ9 QUESTIONS 1 & 2: 0
3. TROUBLE FALLING OR STAYING ASLEEP: NOT AT ALL
SUM OF ALL RESPONSES TO PHQ QUESTIONS 1-9: 0
2. FEELING DOWN, DEPRESSED OR HOPELESS: NOT AT ALL
SUM OF ALL RESPONSES TO PHQ QUESTIONS 1-9: 0
SUM OF ALL RESPONSES TO PHQ QUESTIONS 1-9: 0

## 2024-11-05 NOTE — PROGRESS NOTES
[facility]  DRE MORGAN CNP  [facility]  [address] [place] [id number]  Dept: [phone]  Dept Fax: [phone]      Name: Renata Hackett  : 1960         Chief Complaint:     Chief Complaint   Patient presents with    Cough     X 3 days. Patient c/o productive cough.        History of Present Illness:      Renata Hackett is a 64 y.o.  female who presents with Cough (X 3 days. Patient c/o productive cough. )      HPI    Renata is here today for a sick visit.  She has been coughing for 3 days and feeling worse today.  She has a productive cough.  She has been wheezing and having shortness of breath that is worse than her normal.  No fever documented but has had chills and sweats at home.  She has had rhinorrhea and congestion.  Denies ear pain.  Throat hurts from coughing.  She has taken Tylenol and Advil at home.  She has used tessalon for the cough.      Past Medical History:     Past Medical History:   Diagnosis Date    Anxiety 2012    Broken bones     COPD (chronic obstructive pulmonary disease) (HCC)     Depression     Gallbladder & bile duct stone with obstruction     GERD (gastroesophageal reflux disease)     Hyperlipidemia 2012    Hypertension 2012    Numbness and tingling     generalized    Obesity     Sleep apnea     Stroke (cerebrum) (HCC) 2018    Swelling of left lower extremity       Reviewed all health maintenance requirements and ordered appropriate tests  Health Maintenance Due   Topic Date Due    DEXA (modify frequency per FRAX score)  Never done    COVID-19 Vaccine (3 - 2023-24 season) 2024       Past Surgical History:     Past Surgical History:   Procedure Laterality Date    ABDOMEN SURGERY      repair of bile duct.     CHOLECYSTECTOMY      COLONOSCOPY      ENDOSCOPY, COLON, DIAGNOSTIC      HERNIA REPAIR  2015    HYSTERECTOMY (CERVIX STATUS UNKNOWN)      uterine prolapse, ovaries remain.    DE COLON CA SCRN NOT HI RSK IND N/A

## 2024-11-05 NOTE — PATIENT INSTRUCTIONS
SURVEY:     You may be receiving a survey from [facility] regarding your visit today.     Please complete the survey to enable us to provide the highest quality of care to you and your family.     If you cannot score us a very good on any question, please call the office to discuss how we could have made your experience a very good one.     Thank you,    Raj Salgado, APRN-CNP  Diana Ramos, APRN-CNP  Cindy, LPN  Teresa, CMA  Nicho, CMA  Antonia, CMA  Sofia, PCA  Georgina, CMA  Vivian, PM

## 2024-11-06 ENCOUNTER — OFFICE VISIT (OUTPATIENT)
Dept: VASCULAR SURGERY | Age: 64
End: 2024-11-06

## 2024-11-06 VITALS
DIASTOLIC BLOOD PRESSURE: 72 MMHG | WEIGHT: 241 LBS | HEIGHT: 62 IN | TEMPERATURE: 97.6 F | BODY MASS INDEX: 44.35 KG/M2 | HEART RATE: 93 BPM | SYSTOLIC BLOOD PRESSURE: 126 MMHG | RESPIRATION RATE: 18 BRPM

## 2024-11-06 DIAGNOSIS — M79.89 LEFT LEG SWELLING: Primary | ICD-10-CM

## 2024-11-06 NOTE — PATIENT INSTRUCTIONS
SURVEY:    Thank you for allowing us to care for you today.    You may be receiving a survey from [facility] regarding your visit today- electronically or via mail.      Please help us by completing the survey as this will provide the needed feedback to ensure we are providing the very best care for you and your family.    If you cannot score us a very good on any question, please call the office to discuss how we could have made your experience a very good one.    Thank you.       STAFF:    Nicole Kirk, Mariah Araujo, Paola Escobar      CLINICAL STAFF:    Misty Schumacher LPN, Lima Ayala LPN, Karen Izaguirre LPN, Franca Miller CMA

## 2024-11-06 NOTE — PROGRESS NOTES
Renata Hackett was seen on 11/6/2024 for   Chief Complaint   Patient presents with    Leg Swelling     Patient reports intermittent left lower leg swelling and pain. Completed US/LYNETTE study on 10/14/24.                               REVIEW OF SYSTEMS    Constitutional Weight: absent, A, Fatigue: absent Fever: absent   HEENT Ears: normal,Visual disturbance: absent Sore throat: absent,    Respiratory Shortness of breath: present, Cough: present;, Snoring: absent   Cardivascular Chest pain: absent,  Leg pain: present,Leg swelling:present, Non-healing wound:absent    GI Diarrhea: absent, Abdominal Pain: absent    Urinary frequency: absent, Urinary incontinence: absent   Musculoskeletal Neck pain: absent, Back pain: present, Restless Leg:present     Dermatological Hair loss: absent, Skin changes: absent   Neurological  Sudden Loss of Vision in one eye:absent, Slurred Speech:absent, Weakness on one side of body: absent,Headache: absent   Psychiatric Anxiety: absent, Depression: absent   Hematologic Abnormal bleeding: absent,

## 2024-11-06 NOTE — PROGRESS NOTES
Renata Hackett was seen on 11/6/2024 for   Chief Complaint   Patient presents with    Leg Swelling     Patient reports intermittent left lower leg swelling and pain. Completed US/LYNETTE study on 10/14/24.   .  11/6/24 1st [facility] visit. Left leg and foot swells. Standing for brief periods, or driving. Much worse over last 3-4 months. No hx of blood clot. Mother had blood clot. No hx of varicose veins. Unless she has swelling, she can walk ok. 10/14/24 had LYNETTE with R NC, TBI 0.85 L 1.15 TBI 0.79  Has been doing a lot of long drives to [place]. Swelling is painful. Gets some RLS sx.   Current smoker. Takes atorva. Lives in [place]. Sometimes left leg goes numb with standing  Most recent thyroid, hemoglobin, lytes, LFT's unremarkable.  No hx of heart disease. Echo and stress were ok with mild tr.  Doesn't like to wear socks.    Social History     Socioeconomic History    Marital status:      Spouse name: Not on file    Number of children: Not on file    Years of education: Not on file    Highest education level: Not on file   Occupational History    Not on file   Tobacco Use    Smoking status: Every Day     Current packs/day: 0.10     Average packs/day: 0.7 packs/day for 45.1 years (30.5 ttl pk-yrs)     Types: Cigarettes     Start date: 10/8/1979    Smokeless tobacco: Never    Tobacco comments:     2-3 cigarettes a day   Vaping Use    Vaping status: Never Used   Substance and Sexual Activity    Alcohol use: Yes     Alcohol/week: 5.0 standard drinks of alcohol     Types: 5 Drinks containing 0.5 oz of alcohol per week     Comment: daily    Drug use: No    Sexual activity: Not Currently     Partners: Male   Other Topics Concern    Not on file   Social History Narrative    Not on file     Social Determinants of Health     Financial Resource Strain: Patient Declined (1/10/2024)    Overall Financial Resource Strain (CARDIA)     Difficulty of Paying Living Expenses: Patient declined   Food Insecurity: Patient

## 2024-11-29 ENCOUNTER — HOSPITAL ENCOUNTER (OUTPATIENT)
Dept: VASCULAR LAB | Age: 64
Discharge: HOME | End: 2024-12-01
Attending: INTERNAL MEDICINE
Payer: MEDICARE

## 2024-11-29 DIAGNOSIS — M79.89 LEFT LEG SWELLING: ICD-10-CM

## 2024-11-29 PROCEDURE — 93971 EXTREMITY STUDY: CPT

## 2024-11-30 PROCEDURE — 93971 EXTREMITY STUDY: CPT | Performed by: SURGERY

## 2024-12-18 ENCOUNTER — OFFICE VISIT (OUTPATIENT)
Dept: VASCULAR SURGERY | Age: 64
End: 2024-12-18
Payer: MEDICARE

## 2024-12-18 VITALS
TEMPERATURE: 97 F | HEIGHT: 62 IN | DIASTOLIC BLOOD PRESSURE: 71 MMHG | BODY MASS INDEX: 44.16 KG/M2 | RESPIRATION RATE: 20 BRPM | HEART RATE: 85 BPM | WEIGHT: 240 LBS | SYSTOLIC BLOOD PRESSURE: 137 MMHG

## 2024-12-18 DIAGNOSIS — M79.605 LEFT LEG PAIN: ICD-10-CM

## 2024-12-18 DIAGNOSIS — M79.89 LEFT LEG SWELLING: Primary | ICD-10-CM

## 2024-12-18 PROCEDURE — G8427 DOCREV CUR MEDS BY ELIG CLIN: HCPCS | Performed by: INTERNAL MEDICINE

## 2024-12-18 PROCEDURE — 3017F COLORECTAL CA SCREEN DOC REV: CPT | Performed by: INTERNAL MEDICINE

## 2024-12-18 PROCEDURE — 3078F DIAST BP <80 MM HG: CPT | Performed by: INTERNAL MEDICINE

## 2024-12-18 PROCEDURE — G8400 PT W/DXA NO RESULTS DOC: HCPCS | Performed by: INTERNAL MEDICINE

## 2024-12-18 PROCEDURE — 1090F PRES/ABSN URINE INCON ASSESS: CPT | Performed by: INTERNAL MEDICINE

## 2024-12-18 PROCEDURE — 1123F ACP DISCUSS/DSCN MKR DOCD: CPT | Performed by: INTERNAL MEDICINE

## 2024-12-18 PROCEDURE — G8417 CALC BMI ABV UP PARAM F/U: HCPCS | Performed by: INTERNAL MEDICINE

## 2024-12-18 PROCEDURE — G8482 FLU IMMUNIZE ORDER/ADMIN: HCPCS | Performed by: INTERNAL MEDICINE

## 2024-12-18 PROCEDURE — 3075F SYST BP GE 130 - 139MM HG: CPT | Performed by: INTERNAL MEDICINE

## 2024-12-18 PROCEDURE — 99214 OFFICE O/P EST MOD 30 MIN: CPT | Performed by: INTERNAL MEDICINE

## 2024-12-18 PROCEDURE — 4004F PT TOBACCO SCREEN RCVD TLK: CPT | Performed by: INTERNAL MEDICINE

## 2024-12-18 NOTE — PROGRESS NOTES
Renata Hackett was seen on 12/18/2024 for   Chief Complaint   Patient presents with    Leg Swelling     1 month follow up. Completed venous duplex 11/29/24. Patient states swelling is \"about the same\" since last visit.                               REVIEW OF SYSTEMS    Constitutional Weight: absent, A, Fatigue: absent Fever: absent   HEENT Ears: normal,Visual disturbance: absent Sore throat: absent,    Respiratory Shortness of breath: present, Cough: present;, Snoring: absent   Cardivascular Chest pain: absent,  Leg pain: absent,Leg swelling:present, Non-healing wound:absent    GI Diarrhea: absent, Abdominal Pain: absent    Urinary frequency: absent, Urinary incontinence: absent   Musculoskeletal Neck pain: absent, Back pain: absent, Restless Leg:absent     Dermatological Hair loss: absent, Skin changes: absent   Neurological  Sudden Loss of Vision in one eye:absent, Slurred Speech:absent, Weakness on one side of body: absent,Headache: absent   Psychiatric Anxiety: absent, Depression: absent   Hematologic Abnormal bleeding: absent,          
appreciable swelling on exam today, that seems unlikely.  Sx of back and leg pain sound suspicious for neurogenic process.  She will discuss with Raj Salgado CNP  Plan of care:  30 min chart review and pt encounter  Rtc summer  Follow up and evaluate.       Electronically signed by Patel Castro MD on 12/18/24 at 11:40 AM EST

## 2024-12-18 NOTE — PATIENT INSTRUCTIONS
SURVEY:    Thank you for allowing us to care for you today.    You may be receiving a survey from [facility] regarding your visit today- electronically or via mail.      Please help us by completing the survey as this will provide the needed feedback to ensure we are providing the very best care for you and your family.    If you cannot score us a very good on any question, please call the office to discuss how we could have made your experience a very good one.    Thank you.       STAFF:    Nicole BONDS, Mariah LLANOS, Paola WAITE CMA      CLINICAL STAFF:    Misty GOMEZ LPN, Lima MERA LPN, Karen OLIVA LPN, Franca BONDS

## 2025-02-11 NOTE — PROGRESS NOTES
conjunction with her nasal mask to prevent dry mouth and improve compliance. She was also encouraged to be more consistent with using the CPAP machine to experience its full benefits..    2. Chronic Obstructive Pulmonary Disease (COPD).  Her pulmonary function test conducted in October 2024 indicated a mild obstructive defect consistent with smoking-related damage. She experiences bronchospastic symptoms, such as twitchiness in her lungs when exposed to smoke, fragrances, cold air, or consuming cold items. She is currently using Spiriva Respimat (tiotropium) and albuterol. A prescription for Stiolto Respimat, which combines tiotropium with a long-acting bronchodilator, will be [place] [facility] [place] [facility] [place] If it is not covered by her insurance, alternative medications in the same drug class will be considered. A CT lung screening will be ordered, and she will be contacted if any abnormalities are detected.  She is unable to tolerate any inhaled steroids due to recurrent severe thrush    3. Smoking Cessation.  She was strongly advised to quit smoking and avoid vaping or smoking marijuana. The importance of smoking cessation for her lung health was emphasized.    Follow-up  The patient will follow up in 6 months.              Electronically signed by OLIVER Junior CNP on 2/19/2025 at 3:09 PM    Note completed with voice recognition software.  Typographical errors, occasional wrong word or sound alike substitutions may have occurred due to the inherent limitations of voice recognition software.  Read the chart carefully and recognize using context where substitutions may have occurred.  If any questions please do not hesitate to contact me for clarificationDiscussed with the patient the current USPSTF guidelines released March 9, 2021 for screening for lung cancer.    For adults aged 50 to 80 years who have a 20 pack-year smoking history and currently smoke or have quit within the past 15 years the

## 2025-02-19 ENCOUNTER — OFFICE VISIT (OUTPATIENT)
Dept: PULMONOLOGY | Age: 65
End: 2025-02-19
Payer: MEDICARE

## 2025-02-19 VITALS
SYSTOLIC BLOOD PRESSURE: 147 MMHG | DIASTOLIC BLOOD PRESSURE: 87 MMHG | OXYGEN SATURATION: 96 % | HEIGHT: 62 IN | RESPIRATION RATE: 18 BRPM | WEIGHT: 232 LBS | HEART RATE: 126 BPM | BODY MASS INDEX: 42.69 KG/M2 | TEMPERATURE: 96.7 F

## 2025-02-19 DIAGNOSIS — E66.01 OBESITY, CLASS III, BMI 40-49.9 (MORBID OBESITY): ICD-10-CM

## 2025-02-19 DIAGNOSIS — Z87.891 PERSONAL HISTORY OF TOBACCO USE: Primary | ICD-10-CM

## 2025-02-19 DIAGNOSIS — J44.89 COPD WITH ASTHMA (HCC): ICD-10-CM

## 2025-02-19 DIAGNOSIS — F17.200 SMOKING: ICD-10-CM

## 2025-02-19 DIAGNOSIS — G47.33 OSA (OBSTRUCTIVE SLEEP APNEA): ICD-10-CM

## 2025-02-19 PROBLEM — J44.9 CHRONIC OBSTRUCTIVE PULMONARY DISEASE (HCC): Status: RESOLVED | Noted: 2020-06-10 | Resolved: 2025-02-19

## 2025-02-19 PROCEDURE — G8417 CALC BMI ABV UP PARAM F/U: HCPCS | Performed by: NURSE PRACTITIONER

## 2025-02-19 PROCEDURE — G0296 VISIT TO DETERM LDCT ELIG: HCPCS | Performed by: NURSE PRACTITIONER

## 2025-02-19 PROCEDURE — G8427 DOCREV CUR MEDS BY ELIG CLIN: HCPCS | Performed by: NURSE PRACTITIONER

## 2025-02-19 PROCEDURE — G8400 PT W/DXA NO RESULTS DOC: HCPCS | Performed by: NURSE PRACTITIONER

## 2025-02-19 PROCEDURE — 1123F ACP DISCUSS/DSCN MKR DOCD: CPT | Performed by: NURSE PRACTITIONER

## 2025-02-19 PROCEDURE — 3023F SPIROM DOC REV: CPT | Performed by: NURSE PRACTITIONER

## 2025-02-19 PROCEDURE — 3077F SYST BP >= 140 MM HG: CPT | Performed by: NURSE PRACTITIONER

## 2025-02-19 PROCEDURE — 3079F DIAST BP 80-89 MM HG: CPT | Performed by: NURSE PRACTITIONER

## 2025-02-19 PROCEDURE — 4004F PT TOBACCO SCREEN RCVD TLK: CPT | Performed by: NURSE PRACTITIONER

## 2025-02-19 PROCEDURE — 3017F COLORECTAL CA SCREEN DOC REV: CPT | Performed by: NURSE PRACTITIONER

## 2025-02-19 PROCEDURE — 1090F PRES/ABSN URINE INCON ASSESS: CPT | Performed by: NURSE PRACTITIONER

## 2025-02-19 PROCEDURE — 99214 OFFICE O/P EST MOD 30 MIN: CPT | Performed by: NURSE PRACTITIONER

## 2025-02-19 ASSESSMENT — ENCOUNTER SYMPTOMS
GASTROINTESTINAL NEGATIVE: 1
ALLERGIC/IMMUNOLOGIC NEGATIVE: 1
EYES NEGATIVE: 1

## 2025-02-19 NOTE — PATIENT INSTRUCTIONS
SURVEY:    Thank you for allowing us to care for you today.    You may be receiving a survey from [facility] regarding your visit today- electronically or via mail.      Please help us by completing the survey as this will provide the needed feedback to ensure we are providing the very best care for you and your family.    If you cannot score us a very good on any question, please call the office to discuss how we could have made your experience a very good one.    Thank you.       STAFF:    Brittani Lemus, Paola LÓPEZ      CLINICAL STAFF:    Misty GARCIA, Nicole LÓPEZ, Franca LÓPEZ, Lima GARCIA        Learning About Lung Cancer Screening  What is screening for lung cancer?     Lung cancer screening is a way to find some lung cancers early, before a person has any symptoms of the cancer.  Lung cancer screening may help those who have the highest risk for lung cancer--people age 50 and older who are or were heavy smokers. For most people, who aren't at increased risk, screening for lung cancer probably isn't helpful.  Screening won't prevent cancer. And it may not find all lung cancers. Lung cancer screening may lower the risk of dying from lung cancer in a small number of people.  How is it done?  Lung cancer screening is done with a low-dose CT (computed tomography) scan. A CT scan uses X-rays, or radiation, to make detailed pictures of your body. Experts recommend that screening be done in medical centers that focus on finding and treating lung cancer.  Who is screening recommended for?  Lung cancer screening is recommended for people age 50 and older who are or were heavy smokers. That means people with a smoking history of at least 20 pack years. A pack year is a way to measure how heavy a smoker you are or were.  To figure out your pack years, multiply how many packs a day on average (assuming 20 cigarettes per pack) you have smoked by how many years you have smoked. For example:  If you smoked 1 pack a

## 2025-03-14 ENCOUNTER — HOSPITAL ENCOUNTER (OUTPATIENT)
Age: 65
Discharge: HOME | End: 2025-03-14
Payer: MEDICARE

## 2025-03-14 DIAGNOSIS — E03.8 SUBCLINICAL HYPOTHYROIDISM: ICD-10-CM

## 2025-03-14 LAB
ALT SERPL-CCNC: 21 U/L (ref 10–35)
ANION GAP SERPL CALCULATED.3IONS-SCNC: 10 MMOL/L (ref 9–16)
AST SERPL-CCNC: 18 U/L (ref 10–35)
BASOPHILS # BLD: 0.05 K/UL (ref 0–0.2)
BASOPHILS NFR BLD: 1 % (ref 0–2)
BUN SERPL-MCNC: 11 MG/DL (ref 8–23)
BUN/CREAT SERPL: 11 (ref 9–20)
CALCIUM SERPL-MCNC: 9.6 MG/DL (ref 8.6–10.4)
CHLORIDE SERPL-SCNC: 101 MMOL/L (ref 98–107)
CHOLEST SERPL-MCNC: 171 MG/DL (ref 0–199)
CHOLESTEROL/HDL RATIO: 3.4
CO2 SERPL-SCNC: 30 MMOL/L (ref 20–31)
CREAT SERPL-MCNC: 1 MG/DL (ref 0.5–0.9)
EOSINOPHIL # BLD: 0.27 K/UL (ref 0–0.44)
EOSINOPHILS RELATIVE PERCENT: 4 % (ref 1–4)
ERYTHROCYTE [DISTWIDTH] IN BLOOD BY AUTOMATED COUNT: 13.7 % (ref 11.8–14.4)
GFR, ESTIMATED: 65 ML/MIN/1.73M2
GLUCOSE SERPL-MCNC: 118 MG/DL (ref 74–99)
HCT VFR BLD AUTO: 43 % (ref 36.3–47.1)
HDLC SERPL-MCNC: 50 MG/DL
HGB BLD-MCNC: 13.8 G/DL (ref 11.9–15.1)
IMM GRANULOCYTES # BLD AUTO: 0.03 K/UL (ref 0–0.3)
IMM GRANULOCYTES NFR BLD: 0 %
LDLC SERPL CALC-MCNC: 86 MG/DL (ref 0–100)
LYMPHOCYTES NFR BLD: 1.36 K/UL (ref 1.1–3.7)
LYMPHOCYTES RELATIVE PERCENT: 19 % (ref 24–43)
MCH RBC QN AUTO: 28.3 PG (ref 25.2–33.5)
MCHC RBC AUTO-ENTMCNC: 32.1 G/DL (ref 28.4–34.8)
MCV RBC AUTO: 88.1 FL (ref 82.6–102.9)
MONOCYTES NFR BLD: 0.59 K/UL (ref 0.1–1.2)
MONOCYTES NFR BLD: 8 % (ref 3–12)
NEUTROPHILS NFR BLD: 68 % (ref 36–65)
NEUTS SEG NFR BLD: 4.89 K/UL (ref 1.5–8.1)
NRBC BLD-RTO: 0 PER 100 WBC
PLATELET # BLD AUTO: 270 K/UL (ref 138–453)
PMV BLD AUTO: 9.8 FL (ref 8.1–13.5)
POTASSIUM SERPL-SCNC: 4.3 MMOL/L (ref 3.7–5.3)
RBC # BLD AUTO: 4.88 M/UL (ref 3.95–5.11)
SODIUM SERPL-SCNC: 141 MMOL/L (ref 136–145)
T4 FREE SERPL-MCNC: 0.9 NG/DL (ref 0.92–1.68)
TRIGL SERPL-MCNC: 175 MG/DL
TSH SERPL DL<=0.05 MIU/L-ACNC: 2.53 UIU/ML (ref 0.27–4.2)
VLDLC SERPL CALC-MCNC: 35 MG/DL (ref 1–30)
WBC OTHER # BLD: 7.2 K/UL (ref 3.5–11.3)

## 2025-03-14 PROCEDURE — 84450 TRANSFERASE (AST) (SGOT): CPT

## 2025-03-14 PROCEDURE — 84443 ASSAY THYROID STIM HORMONE: CPT

## 2025-03-14 PROCEDURE — 85025 COMPLETE CBC W/AUTO DIFF WBC: CPT

## 2025-03-14 PROCEDURE — 80061 LIPID PANEL: CPT

## 2025-03-14 PROCEDURE — 84460 ALANINE AMINO (ALT) (SGPT): CPT

## 2025-03-14 PROCEDURE — 84439 ASSAY OF FREE THYROXINE: CPT

## 2025-03-14 PROCEDURE — 36415 COLL VENOUS BLD VENIPUNCTURE: CPT

## 2025-03-14 PROCEDURE — 80048 BASIC METABOLIC PNL TOTAL CA: CPT

## 2025-03-17 DIAGNOSIS — E03.8 SUBCLINICAL HYPOTHYROIDISM: ICD-10-CM

## 2025-03-17 DIAGNOSIS — F34.1 DYSTHYMIA: ICD-10-CM

## 2025-03-17 RX ORDER — DULOXETIN HYDROCHLORIDE 30 MG/1
30 CAPSULE, DELAYED RELEASE ORAL DAILY
Qty: 90 CAPSULE | Refills: 1 | Status: SHIPPED | OUTPATIENT
Start: 2025-03-17

## 2025-03-17 RX ORDER — LEVOTHYROXINE SODIUM 25 UG/1
25 TABLET ORAL DAILY
Qty: 90 TABLET | Refills: 1 | Status: SHIPPED | OUTPATIENT
Start: 2025-03-17

## 2025-03-17 NOTE — TELEPHONE ENCOUNTER
Health Maintenance   Topic Date Due    DTaP/Tdap/Td vaccine (1 - Tdap) Never done    Shingles vaccine (1 of 2) Never done    DEXA (modify frequency per FRAX score)  Never done    Respiratory Syncytial Virus (RSV) Pregnant or age 60 yrs+ (1 - Risk 60-74 years 1-dose series) Never done    COVID-19 Vaccine (3 - 2024-25 season) 09/01/2024    Annual Wellness Visit (Medicare Advantage)  01/01/2025    Lung Cancer Screening &/or Counseling  03/18/2025    Pneumococcal 50+ years Vaccine (2 of 2 - PCV) 09/18/2025 (Originally 6/4/2020)    Hepatitis C screen  09/18/2025 (Originally 7/12/1978)    HIV screen  09/18/2025 (Originally 7/[birth year]/1974)    A1C test (Diabetic or Prediabetic)  04/18/2025    Depression Monitoring  11/05/2025    Lipids  03/14/2026    Breast cancer screen  04/08/2026    Colorectal Cancer Screen  09/25/2028    Flu vaccine  Completed    Hepatitis A vaccine  Aged Out    Hepatitis B vaccine  Aged Out    Hib vaccine  Aged Out    Polio vaccine  Aged Out    Meningococcal (ACWY) vaccine  Aged Out    Meningococcal B vaccine  Aged Out    Pneumococcal 0-49 years Vaccine  Discontinued    Diabetes screen  Discontinued    Cervical cancer screen  Discontinued             (applicable per patient's age: Cancer Screenings, Depression Screening, Fall Risk Screening, Immunizations)    Hemoglobin A1C (%)   Date Value   04/18/2024 6.0   03/29/2022 5.6   12/10/2020 5.3     AST (U/L)   Date Value   03/14/2025 18     ALT (U/L)   Date Value   03/14/2025 21     BUN (mg/dL)   Date Value   03/14/2025 11      (goal A1C is < 7)   (goal LDL is <100) need 30-50% reduction from baseline     BP Readings from Last 3 Encounters:   02/19/25 (!) 147/87   12/18/24 137/71   11/06/24 126/72    (goal /80)      All Future Testing planned in [facility]:  Lab Frequency Next Occurrence   CT Lung Screen (Initial/Annual/Baseline) Once 03/20/2025       Next Visit Date:  Future Appointments   Date Time Provider Department Center   3/25/2025  3:15 PM [facility] CAT

## 2025-03-25 ENCOUNTER — HOSPITAL ENCOUNTER (OUTPATIENT)
Dept: CT IMAGING | Age: 65
Discharge: HOME | End: 2025-03-27
Payer: MEDICARE

## 2025-03-25 DIAGNOSIS — Z87.891 PERSONAL HISTORY OF TOBACCO USE: ICD-10-CM

## 2025-03-25 PROCEDURE — 71271 CT THORAX LUNG CANCER SCR C-: CPT

## 2025-03-26 ENCOUNTER — OFFICE VISIT (OUTPATIENT)
Dept: PRIMARY CARE CLINIC | Age: 65
End: 2025-03-26
Payer: MEDICARE

## 2025-03-26 VITALS
DIASTOLIC BLOOD PRESSURE: 80 MMHG | SYSTOLIC BLOOD PRESSURE: 116 MMHG | HEART RATE: 83 BPM | BODY MASS INDEX: 43.82 KG/M2 | WEIGHT: 238.1 LBS | HEIGHT: 62 IN | RESPIRATION RATE: 18 BRPM | TEMPERATURE: 97.8 F | OXYGEN SATURATION: 94 %

## 2025-03-26 DIAGNOSIS — Z78.0 POST-MENOPAUSAL: ICD-10-CM

## 2025-03-26 DIAGNOSIS — Z23 NEED FOR TETANUS BOOSTER: ICD-10-CM

## 2025-03-26 DIAGNOSIS — E03.8 SUBCLINICAL HYPOTHYROIDISM: ICD-10-CM

## 2025-03-26 DIAGNOSIS — Z00.00 INITIAL MEDICARE ANNUAL WELLNESS VISIT: Primary | ICD-10-CM

## 2025-03-26 DIAGNOSIS — H60.313 ACUTE DIFFUSE OTITIS EXTERNA OF BOTH EARS: ICD-10-CM

## 2025-03-26 DIAGNOSIS — Z23 NEED FOR PNEUMOCOCCAL VACCINATION: ICD-10-CM

## 2025-03-26 DIAGNOSIS — Z12.31 VISIT FOR SCREENING MAMMOGRAM: ICD-10-CM

## 2025-03-26 DIAGNOSIS — Z23 NEED FOR SHINGLES VACCINE: ICD-10-CM

## 2025-03-26 DIAGNOSIS — R73.01 ELEVATED FASTING GLUCOSE: ICD-10-CM

## 2025-03-26 PROBLEM — F33.1 MODERATE EPISODE OF RECURRENT MAJOR DEPRESSIVE DISORDER (HCC): Status: RESOLVED | Noted: 2018-09-05 | Resolved: 2025-03-26

## 2025-03-26 LAB — HBA1C MFR BLD: 5.9 %

## 2025-03-26 PROCEDURE — 90471 IMMUNIZATION ADMIN: CPT | Performed by: NURSE PRACTITIONER

## 2025-03-26 PROCEDURE — G0438 PPPS, INITIAL VISIT: HCPCS | Performed by: NURSE PRACTITIONER

## 2025-03-26 PROCEDURE — 90715 TDAP VACCINE 7 YRS/> IM: CPT | Performed by: NURSE PRACTITIONER

## 2025-03-26 PROCEDURE — G0009 ADMIN PNEUMOCOCCAL VACCINE: HCPCS | Performed by: NURSE PRACTITIONER

## 2025-03-26 PROCEDURE — 83036 HEMOGLOBIN GLYCOSYLATED A1C: CPT | Performed by: NURSE PRACTITIONER

## 2025-03-26 PROCEDURE — 90677 PCV20 VACCINE IM: CPT | Performed by: NURSE PRACTITIONER

## 2025-03-26 PROCEDURE — 3079F DIAST BP 80-89 MM HG: CPT | Performed by: NURSE PRACTITIONER

## 2025-03-26 PROCEDURE — 1123F ACP DISCUSS/DSCN MKR DOCD: CPT | Performed by: NURSE PRACTITIONER

## 2025-03-26 PROCEDURE — 3017F COLORECTAL CA SCREEN DOC REV: CPT | Performed by: NURSE PRACTITIONER

## 2025-03-26 PROCEDURE — 3074F SYST BP LT 130 MM HG: CPT | Performed by: NURSE PRACTITIONER

## 2025-03-26 RX ORDER — LEVOTHYROXINE SODIUM 50 UG/1
50 TABLET ORAL DAILY
Qty: 90 TABLET | Refills: 1 | Status: SHIPPED | OUTPATIENT
Start: 2025-03-26

## 2025-03-26 RX ORDER — NEOMYCIN SULFATE, POLYMYXIN B SULFATE AND HYDROCORTISONE 10; 3.5; 1 MG/ML; MG/ML; [USP'U]/ML
3 SUSPENSION/ DROPS AURICULAR (OTIC) 4 TIMES DAILY
Qty: 10 ML | Refills: 0 | Status: SHIPPED | OUTPATIENT
Start: 2025-03-26 | End: 2025-04-02

## 2025-03-26 RX ORDER — ZOSTER VACCINE RECOMBINANT, ADJUVANTED 50 MCG/0.5
0.5 KIT INTRAMUSCULAR SEE ADMIN INSTRUCTIONS
Qty: 0.5 ML | Refills: 0 | Status: SHIPPED | OUTPATIENT
Start: 2025-03-26 | End: 2025-09-22

## 2025-03-26 RX ORDER — NEOMYCIN SULFATE, POLYMYXIN B SULFATE AND HYDROCORTISONE 10; 3.5; 1 MG/ML; MG/ML; [USP'U]/ML
3 SUSPENSION/ DROPS AURICULAR (OTIC) 4 TIMES DAILY
Qty: 10 ML | Refills: 0 | Status: SHIPPED | OUTPATIENT
Start: 2025-03-26 | End: 2025-03-26

## 2025-03-26 SDOH — ECONOMIC STABILITY: FOOD INSECURITY: WITHIN THE PAST 12 MONTHS, THE FOOD YOU BOUGHT JUST DIDN'T LAST AND YOU DIDN'T HAVE MONEY TO GET MORE.: NEVER TRUE

## 2025-03-26 SDOH — ECONOMIC STABILITY: FOOD INSECURITY: WITHIN THE PAST 12 MONTHS, YOU WORRIED THAT YOUR FOOD WOULD RUN OUT BEFORE YOU GOT MONEY TO BUY MORE.: NEVER TRUE

## 2025-03-26 ASSESSMENT — LIFESTYLE VARIABLES
HAVE YOU OR SOMEONE ELSE BEEN INJURED AS A RESULT OF YOUR DRINKING: NO
HOW OFTEN DURING THE LAST YEAR HAVE YOU NEEDED AN ALCOHOLIC DRINK FIRST THING IN THE MORNING TO GET YOURSELF GOING AFTER A NIGHT OF HEAVY DRINKING: NEVER
HAS A RELATIVE, FRIEND, DOCTOR, OR ANOTHER HEALTH PROFESSIONAL EXPRESSED CONCERN ABOUT YOUR DRINKING OR SUGGESTED YOU CUT DOWN: YES, DURING THE PAST YEAR
HOW OFTEN DURING THE LAST YEAR HAVE YOU HAD A FEELING OF GUILT OR REMORSE AFTER DRINKING: NEVER
HOW OFTEN DURING THE LAST YEAR HAVE YOU FOUND THAT YOU WERE NOT ABLE TO STOP DRINKING ONCE YOU HAD STARTED: NEVER
HOW OFTEN DO YOU HAVE A DRINK CONTAINING ALCOHOL: 4 OR MORE TIMES A WEEK
HOW OFTEN DURING THE LAST YEAR HAVE YOU FAILED TO DO WHAT WAS NORMALLY EXPECTED FROM YOU BECAUSE OF DRINKING: NEVER
HOW OFTEN DURING THE LAST YEAR HAVE YOU BEEN UNABLE TO REMEMBER WHAT HAPPENED THE NIGHT BEFORE BECAUSE YOU HAD BEEN DRINKING: NEVER
HOW MANY STANDARD DRINKS CONTAINING ALCOHOL DO YOU HAVE ON A TYPICAL DAY: 3 OR 4

## 2025-03-26 ASSESSMENT — PATIENT HEALTH QUESTIONNAIRE - PHQ9
SUM OF ALL RESPONSES TO PHQ QUESTIONS 1-9: 0
2. FEELING DOWN, DEPRESSED OR HOPELESS: NOT AT ALL
6. FEELING BAD ABOUT YOURSELF - OR THAT YOU ARE A FAILURE OR HAVE LET YOURSELF OR YOUR FAMILY DOWN: NOT AT ALL
10. IF YOU CHECKED OFF ANY PROBLEMS, HOW DIFFICULT HAVE THESE PROBLEMS MADE IT FOR YOU TO DO YOUR WORK, TAKE CARE OF THINGS AT HOME, OR GET ALONG WITH OTHER PEOPLE: NOT DIFFICULT AT ALL
1. LITTLE INTEREST OR PLEASURE IN DOING THINGS: NOT AT ALL
8. MOVING OR SPEAKING SO SLOWLY THAT OTHER PEOPLE COULD HAVE NOTICED. OR THE OPPOSITE, BEING SO FIGETY OR RESTLESS THAT YOU HAVE BEEN MOVING AROUND A LOT MORE THAN USUAL: NOT AT ALL
5. POOR APPETITE OR OVEREATING: NOT AT ALL
3. TROUBLE FALLING OR STAYING ASLEEP: NOT AT ALL
4. FEELING TIRED OR HAVING LITTLE ENERGY: NOT AT ALL
9. THOUGHTS THAT YOU WOULD BE BETTER OFF DEAD, OR OF HURTING YOURSELF: NOT AT ALL
7. TROUBLE CONCENTRATING ON THINGS, SUCH AS READING THE NEWSPAPER OR WATCHING TELEVISION: NOT AT ALL
SUM OF ALL RESPONSES TO PHQ QUESTIONS 1-9: 0

## 2025-03-26 ASSESSMENT — ENCOUNTER SYMPTOMS
SORE THROAT: 0
CONSTIPATION: 0
COUGH: 0
HOARSE VOICE: 0
DIARRHEA: 0
HAIR LOSS: 0
ABDOMINAL PAIN: 0
RHINORRHEA: 0
VOMITING: 0
VISUAL CHANGE: 0

## 2025-03-26 NOTE — PATIENT INSTRUCTIONS
9 Ways to Cut Back on Drinking  Maybe you've found yourself drinking more alcohol than you'd prefer. If you want to cut back, here are some ideas to try.    Think before you drink.  Do you really want a drink, or is it just a habit? If you're used to having a drink at a certain time, try doing something else then.     Look for substitutes.  Find some no-alcohol drinks that you enjoy, like flavored seltzer water, tea with honey, or tonic with a slice of lime. Or try alcohol-free beer or \"virgin\" cocktails (without the alcohol).     Drink more water.  Use water to quench your thirst. Drink a glass of water before you have any alcohol. Have another glass along with every drink or between drinks.     Shrink your drink.  For example, have a bottle of beer instead of a pint. Use a smaller glass for wine. Choose drinks with lower alcohol content (ABV%). Or use less liquor and more mixer in cocktails.     Slow down.  It's easy to drink quickly and without thinking about it. Pay attention, and make each drink last longer.     Do the math.  Total up how much you spend on alcohol each month. How much is that a year? If you cut back, what could you do with the money you save?     Take a break.  Choose a day or two each week when you won't drink at all. Notice how you feel on those days, physically and emotionally. How did you sleep? Do you feel better? Over time, add more break days.     Count calories.  Would you like to lose some weight? For some people that's a good motivator for cutting back. Figure out how many calories are in each drink. How many does that add up to in a day? In a week? In a month?     Practice saying no.  Be ready when someone offers you a drink. Try: \"Thanks, I've had enough.\" Or \"Thanks, but I'm cutting back.\" Or \"No, thanks. I feel better when I drink less.\"   Current as of: August 20, 2024  Content Version: 14.4  © 5383-9798 [employer] [facility].   Care instructions adapted under license by

## 2025-03-26 NOTE — PROGRESS NOTES
After obtaining consent, and per orders of Raj LINDA, injection of Pneumococcal and tdap given in Left arm by Antonia Torres MA. Patient instructed to remain in clinic for 20 minutes afterwards, and to report any adverse reaction to me immediately.

## 2025-03-26 NOTE — PROGRESS NOTES
Name: Renata Hackett  : 1960         Chief Complaint:     Chief Complaint   Patient presents with    Fatigue     Getting worse wants to sleep all time. C/O generalized pain all over.       History of Present Illness:      Renata Hackett is a 63 y.o.  female who presents with Fatigue (Getting worse wants to sleep all time. C/O generalized pain all over.)      Renata is here today for a routine office visit.    She states she has been fatigued for the last 2 months or since the first of the year.  She states she was ill with respiratory illness and then has been fatigued ever since.  She also is wondering if her anemia is \"kicking up again\".  She has not had blood work in a few years.    She says she has also been feeling rather down.  She states it has been about a year since the loss of her mother-in-law who she had a \"love-hate relationship\" with.  She states she has never really grieved.  She has a lot of stress at home with her  and family.  No domestic issues just general chronic stress.  She has been compliant with her medications.  See below for further comments.    Fatigue  This is a recurrent problem. The current episode started more than 1 month ago. The problem occurs daily. The problem has been waxing and waning. Associated symptoms include fatigue. Pertinent negatives include no abdominal pain, anorexia, arthralgias, change in bowel habit, chest pain, chills, congestion, coughing, diaphoresis, fever, headaches, joint swelling, myalgias, nausea, neck pain, numbness, rash, sore throat, swollen glands, urinary symptoms, vertigo, visual change, vomiting or weakness. The symptoms are aggravated by stress. She has tried rest and sleep for the symptoms. The treatment provided mild relief.   Depression  Visit Type: follow-up  Patient presents with the following symptoms: anhedonia, decreased concentration, depressed mood, fatigue, hypersomnia, irritability, malaise, nervousness/anxiety and  Detail Level: Detailed General Sunscreen Counseling: I recommended a broad spectrum sunscreen with a SPF of 30 or higher.  I explained that SPF 30 sunscreens block approximately 97 percent of the sun's harmful rays.  Sunscreens should be applied at least 15 minutes prior to expected sun exposure and then every 2 hours after that as long as sun exposure continues. If swimming or exercising sunscreen should be reapplied every 45 minutes to an hour after getting wet or sweating.  One ounce, or the equivalent of a shot glass full of sunscreen, is adequate to protect the skin not covered by a bathing suit. I also recommended a lip balm with a sunscreen as well. Sun protective clothing can be used in lieu of sunscreen but must be worn the entire time you are exposed to the sun's rays. Products Recommended: Elta MD UV Clear and Brush on Block

## 2025-03-26 NOTE — PROGRESS NOTES
Medicare Annual Wellness Visit    Renata Hackett is here for Medicare AWV, Thyroid Problem (6 month), and Ear itchiness (1-2 months)    Assessment & Plan   Initial Medicare annual wellness visit  Subclinical hypothyroidism  -     levothyroxine (SYNTHROID) 50 MCG tablet; Take 1 tablet by mouth daily, Disp-90 tablet, R-1Normal  -     Basic Metabolic Panel; Future  -     T4, Free; Future  -     TSH; Future  Acute diffuse otitis externa of both ears  -     neomycin-polymyxin-hydrocortisone (CORTISPORIN) 3.5-77201-2 otic suspension; Place 3 drops into both ears 4 times daily for 7 days, Disp-10 mL, R-0Normal  Need for shingles vaccine  -     zoster recombinant adjuvanted vaccine (SHINGRIX) 50 MCG/0.5ML SUSR injection; Inject 0.5 mLs into the muscle See Admin Instructions 1 dose now and repeat in 2-6 months, Disp-0.5 mL, R-0Print  Post-menopausal  -     DEXA Bone Density Axial Skeleton; Future  Need for tetanus booster  -     Tdap, BOOSTRIX, (age 10 yrs+), IM  Visit for screening mammogram  -     [facility] DENITA DIGITAL SCREEN BILATERAL; Future  Need for pneumococcal vaccination  -     Pneumococcal, PCV20, PREVNAR 20, (age 6w+), IM, PF  Elevated fasting glucose  -     POCT glycosylated hemoglobin (Hb A1C)       Return in 6 months (on 9/26/2025).     Subjective   The following acute and/or chronic problems were also addressed today:  Renata is here today for a Medicare annual wellness visit and routine office visit.    Thyroid Problem  Presents for follow-up visit. Patient reports no anxiety, cold intolerance, constipation, depressed mood, diaphoresis, diarrhea, dry skin, fatigue, hair loss, heat intolerance, hoarse voice, leg swelling, menstrual problem, nail problem, palpitations, tremors, visual change, weight gain or weight loss. The symptoms have been stable.   Ear Pain   There is pain in both ears. This is a recurrent problem. The current episode started 1 to 4 weeks ago. The problem occurs constantly. The problem has been

## 2025-05-07 ENCOUNTER — HOSPITAL ENCOUNTER (OUTPATIENT)
Dept: WOMENS IMAGING | Age: 65
Discharge: HOME | End: 2025-05-09
Payer: MEDICARE

## 2025-05-07 ENCOUNTER — RESULTS FOLLOW-UP (OUTPATIENT)
Dept: PRIMARY CARE CLINIC | Age: 65
End: 2025-05-07

## 2025-05-07 VITALS — HEIGHT: 62 IN | BODY MASS INDEX: 43.24 KG/M2 | WEIGHT: 235 LBS

## 2025-05-07 DIAGNOSIS — Z78.0 POST-MENOPAUSAL: ICD-10-CM

## 2025-05-07 DIAGNOSIS — Z12.31 VISIT FOR SCREENING MAMMOGRAM: ICD-10-CM

## 2025-05-07 PROCEDURE — 77080 DXA BONE DENSITY AXIAL: CPT

## 2025-05-07 PROCEDURE — 77063 BREAST TOMOSYNTHESIS BI: CPT

## 2025-05-07 NOTE — TELEPHONE ENCOUNTER
----- Message from OLIVER Cobian CNP sent at 5/7/2025  1:55 PM EDT -----  Results are normal, please call patient and make them aware.

## 2025-05-07 NOTE — TELEPHONE ENCOUNTER
----- Message from OLIVER Cobian CNP sent at 5/7/2025  3:08 PM EDT -----  Osteopenia noted.  Recommend she start calcium with vitamin D if she is not already taking.  Thank you.

## 2025-05-12 ENCOUNTER — OFFICE VISIT (OUTPATIENT)
Dept: PRIMARY CARE CLINIC | Age: 65
End: 2025-05-12
Payer: MEDICARE

## 2025-05-12 VITALS
HEART RATE: 99 BPM | RESPIRATION RATE: 16 BRPM | SYSTOLIC BLOOD PRESSURE: 134 MMHG | OXYGEN SATURATION: 98 % | TEMPERATURE: 98.6 F | BODY MASS INDEX: 42.98 KG/M2 | DIASTOLIC BLOOD PRESSURE: 86 MMHG | WEIGHT: 235 LBS

## 2025-05-12 DIAGNOSIS — J44.1 COPD EXACERBATION (HCC): Primary | ICD-10-CM

## 2025-05-12 PROCEDURE — 3023F SPIROM DOC REV: CPT | Performed by: NURSE PRACTITIONER

## 2025-05-12 PROCEDURE — 3075F SYST BP GE 130 - 139MM HG: CPT | Performed by: NURSE PRACTITIONER

## 2025-05-12 PROCEDURE — 1123F ACP DISCUSS/DSCN MKR DOCD: CPT | Performed by: NURSE PRACTITIONER

## 2025-05-12 PROCEDURE — G8399 PT W/DXA RESULTS DOCUMENT: HCPCS | Performed by: NURSE PRACTITIONER

## 2025-05-12 PROCEDURE — 99214 OFFICE O/P EST MOD 30 MIN: CPT | Performed by: NURSE PRACTITIONER

## 2025-05-12 PROCEDURE — G8427 DOCREV CUR MEDS BY ELIG CLIN: HCPCS | Performed by: NURSE PRACTITIONER

## 2025-05-12 PROCEDURE — 1090F PRES/ABSN URINE INCON ASSESS: CPT | Performed by: NURSE PRACTITIONER

## 2025-05-12 PROCEDURE — 1036F TOBACCO NON-USER: CPT | Performed by: NURSE PRACTITIONER

## 2025-05-12 PROCEDURE — 3017F COLORECTAL CA SCREEN DOC REV: CPT | Performed by: NURSE PRACTITIONER

## 2025-05-12 PROCEDURE — 3079F DIAST BP 80-89 MM HG: CPT | Performed by: NURSE PRACTITIONER

## 2025-05-12 PROCEDURE — G8417 CALC BMI ABV UP PARAM F/U: HCPCS | Performed by: NURSE PRACTITIONER

## 2025-05-12 RX ORDER — AZITHROMYCIN 250 MG/1
TABLET, FILM COATED ORAL
Qty: 6 TABLET | Refills: 0 | Status: SHIPPED | OUTPATIENT
Start: 2025-05-12 | End: 2025-05-22

## 2025-05-12 RX ORDER — PREDNISONE 20 MG/1
20 TABLET ORAL 2 TIMES DAILY
Qty: 10 TABLET | Refills: 0 | Status: SHIPPED | OUTPATIENT
Start: 2025-05-12 | End: 2025-05-17

## 2025-05-12 RX ORDER — BENZONATATE 100 MG/1
100-200 CAPSULE ORAL 3 TIMES DAILY PRN
Qty: 60 CAPSULE | Refills: 0 | Status: SHIPPED | OUTPATIENT
Start: 2025-05-12 | End: 2025-05-19

## 2025-05-12 ASSESSMENT — PATIENT HEALTH QUESTIONNAIRE - PHQ9
9. THOUGHTS THAT YOU WOULD BE BETTER OFF DEAD, OR OF HURTING YOURSELF: NOT AT ALL
6. FEELING BAD ABOUT YOURSELF - OR THAT YOU ARE A FAILURE OR HAVE LET YOURSELF OR YOUR FAMILY DOWN: NOT AT ALL
5. POOR APPETITE OR OVEREATING: NOT AT ALL
10. IF YOU CHECKED OFF ANY PROBLEMS, HOW DIFFICULT HAVE THESE PROBLEMS MADE IT FOR YOU TO DO YOUR WORK, TAKE CARE OF THINGS AT HOME, OR GET ALONG WITH OTHER PEOPLE: NOT DIFFICULT AT ALL
SUM OF ALL RESPONSES TO PHQ QUESTIONS 1-9: 0
4. FEELING TIRED OR HAVING LITTLE ENERGY: NOT AT ALL
SUM OF ALL RESPONSES TO PHQ QUESTIONS 1-9: 0
2. FEELING DOWN, DEPRESSED OR HOPELESS: NOT AT ALL
1. LITTLE INTEREST OR PLEASURE IN DOING THINGS: NOT AT ALL
3. TROUBLE FALLING OR STAYING ASLEEP: NOT AT ALL
8. MOVING OR SPEAKING SO SLOWLY THAT OTHER PEOPLE COULD HAVE NOTICED. OR THE OPPOSITE, BEING SO FIGETY OR RESTLESS THAT YOU HAVE BEEN MOVING AROUND A LOT MORE THAN USUAL: NOT AT ALL
7. TROUBLE CONCENTRATING ON THINGS, SUCH AS READING THE NEWSPAPER OR WATCHING TELEVISION: NOT AT ALL

## 2025-05-12 ASSESSMENT — ENCOUNTER SYMPTOMS
RHINORRHEA: 1
EYES NEGATIVE: 1
COUGH: 1
ALLERGIC/IMMUNOLOGIC NEGATIVE: 1
GASTROINTESTINAL NEGATIVE: 1
SHORTNESS OF BREATH: 1
WHEEZING: 1

## 2025-05-12 NOTE — PATIENT INSTRUCTIONS
SURVEY:     You may be receiving a survey from [facility] regarding your visit today.     Please complete the survey to enable us to provide the highest quality of care to you and your family.     If you cannot score us a very good on any question, please call the office to discuss how we could have made your experience a very good one.     Thank you,    Raj Salgado, APRN-CNP  Diana Ramos, APRN-CNP  Cindy, LPN  Teresa, CMA  Nicho, CMA  Antonia, CMA  Sofia, PCA  Georgina, CMA  Vivian, PM

## 2025-05-12 NOTE — PROGRESS NOTES
Physical Exam:   Vitals:  /86   Pulse 99   Temp 98.6 °F (37 °C) (Temporal)   Resp 16   Wt 106.6 kg (235 lb)   SpO2 98%   BMI 42.98 kg/m²     Physical Exam  Vitals and nursing note reviewed.   Constitutional:       General: She is not in acute distress.     Appearance: Normal appearance. She is obese. She is ill-appearing.   HENT:      Head: Normocephalic.      Right Ear: Tympanic membrane and external ear normal.      Left Ear: Tympanic membrane and external ear normal.      Nose: Congestion and rhinorrhea present. Rhinorrhea is clear.      Mouth/Throat:      Lips: Pink.      Mouth: Mucous membranes are moist.      Pharynx: Oropharynx is clear. Posterior oropharyngeal erythema and postnasal drip present.   Eyes:      Extraocular Movements: Extraocular movements intact.      Conjunctiva/sclera: Conjunctivae normal.      Pupils: Pupils are equal, round, and reactive to light.   Cardiovascular:      Rate and Rhythm: Normal rate and regular rhythm.      Heart sounds: Normal heart sounds. No murmur heard.  Pulmonary:      Effort: Pulmonary effort is normal. Prolonged expiration present. No respiratory distress.      Breath sounds: Examination of the right-upper field reveals wheezing. Examination of the left-upper field reveals wheezing. Examination of the right-lower field reveals wheezing. Examination of the left-lower field reveals wheezing. Wheezing (scattered expiratory wheezing) present.   Abdominal:      General: Bowel sounds are normal.      Palpations: Abdomen is soft.   Musculoskeletal:         General: Normal range of motion.      Cervical back: Normal range of motion and neck supple.   Skin:     General: Skin is warm.      Capillary Refill: Capillary refill takes less than 2 seconds.   Neurological:      General: No focal deficit present.      Mental Status: She is alert and oriented to person, place, and time.   Psychiatric:         Mood and Affect: Mood normal.         Behavior: Behavior

## 2025-06-09 DIAGNOSIS — I10 ESSENTIAL HYPERTENSION: ICD-10-CM

## 2025-06-09 RX ORDER — DILTIAZEM HYDROCHLORIDE 180 MG/1
180 CAPSULE, EXTENDED RELEASE ORAL DAILY
Qty: 90 CAPSULE | Refills: 3 | Status: SHIPPED | OUTPATIENT
Start: 2025-06-09

## 2025-06-09 RX ORDER — PHENTERMINE HYDROCHLORIDE 37.5 MG/1
37.5 TABLET ORAL DAILY
COMMUNITY
Start: 2025-05-13

## 2025-06-16 ENCOUNTER — OFFICE VISIT (OUTPATIENT)
Dept: CARDIOLOGY | Age: 65
End: 2025-06-16
Payer: MEDICARE

## 2025-06-16 ENCOUNTER — HOSPITAL ENCOUNTER (OUTPATIENT)
Age: 65
Discharge: HOME | End: 2025-06-16
Payer: MEDICARE

## 2025-06-16 ENCOUNTER — RESULTS FOLLOW-UP (OUTPATIENT)
Dept: CARDIOLOGY | Age: 65
End: 2025-06-16

## 2025-06-16 VITALS
WEIGHT: 237.5 LBS | RESPIRATION RATE: 18 BRPM | DIASTOLIC BLOOD PRESSURE: 77 MMHG | BODY MASS INDEX: 43.71 KG/M2 | SYSTOLIC BLOOD PRESSURE: 123 MMHG | HEART RATE: 99 BPM | HEIGHT: 62 IN

## 2025-06-16 DIAGNOSIS — E66.813 CLASS 3 SEVERE OBESITY WITH BODY MASS INDEX (BMI) OF 40.0 TO 44.9 IN ADULT, UNSPECIFIED OBESITY TYPE, UNSPECIFIED WHETHER SERIOUS COMORBIDITY PRESENT (HCC): ICD-10-CM

## 2025-06-16 DIAGNOSIS — E78.2 MIXED HYPERLIPIDEMIA: ICD-10-CM

## 2025-06-16 DIAGNOSIS — I10 ESSENTIAL HYPERTENSION: ICD-10-CM

## 2025-06-16 DIAGNOSIS — R00.2 PALPITATIONS: ICD-10-CM

## 2025-06-16 DIAGNOSIS — R06.02 SOB (SHORTNESS OF BREATH): ICD-10-CM

## 2025-06-16 DIAGNOSIS — R00.0 TACHYCARDIA: ICD-10-CM

## 2025-06-16 DIAGNOSIS — R06.02 SOB (SHORTNESS OF BREATH): Primary | ICD-10-CM

## 2025-06-16 LAB
ANION GAP SERPL CALCULATED.3IONS-SCNC: 15 MMOL/L (ref 9–16)
BNP SERPL-MCNC: <36 PG/ML (ref 0–125)
BUN SERPL-MCNC: 10 MG/DL (ref 8–23)
BUN/CREAT SERPL: 11 (ref 9–20)
CALCIUM SERPL-MCNC: 9.3 MG/DL (ref 8.6–10.4)
CHLORIDE SERPL-SCNC: 101 MMOL/L (ref 98–107)
CO2 SERPL-SCNC: 23 MMOL/L (ref 20–31)
CREAT SERPL-MCNC: 0.9 MG/DL (ref 0.5–0.9)
ERYTHROCYTE [DISTWIDTH] IN BLOOD BY AUTOMATED COUNT: 13.9 % (ref 11.8–14.4)
GFR, ESTIMATED: 67 ML/MIN/1.73M2
GLUCOSE SERPL-MCNC: 104 MG/DL (ref 74–99)
HCT VFR BLD AUTO: 43.1 % (ref 36.3–47.1)
HGB BLD-MCNC: 13.8 G/DL (ref 11.9–15.1)
MCH RBC QN AUTO: 28.1 PG (ref 25.2–33.5)
MCHC RBC AUTO-ENTMCNC: 32 G/DL (ref 28.4–34.8)
MCV RBC AUTO: 87.8 FL (ref 82.6–102.9)
NRBC BLD-RTO: 0 PER 100 WBC
PLATELET # BLD AUTO: 270 K/UL (ref 138–453)
PMV BLD AUTO: 9.4 FL (ref 8.1–13.5)
POTASSIUM SERPL-SCNC: 3.7 MMOL/L (ref 3.7–5.3)
RBC # BLD AUTO: 4.91 M/UL (ref 3.95–5.11)
SODIUM SERPL-SCNC: 139 MMOL/L (ref 136–145)
WBC OTHER # BLD: 4.1 K/UL (ref 3.5–11.3)

## 2025-06-16 PROCEDURE — 1123F ACP DISCUSS/DSCN MKR DOCD: CPT | Performed by: NURSE PRACTITIONER

## 2025-06-16 PROCEDURE — G8417 CALC BMI ABV UP PARAM F/U: HCPCS | Performed by: NURSE PRACTITIONER

## 2025-06-16 PROCEDURE — 93000 ELECTROCARDIOGRAM COMPLETE: CPT | Performed by: INTERNAL MEDICINE

## 2025-06-16 PROCEDURE — 85027 COMPLETE CBC AUTOMATED: CPT

## 2025-06-16 PROCEDURE — 1090F PRES/ABSN URINE INCON ASSESS: CPT | Performed by: NURSE PRACTITIONER

## 2025-06-16 PROCEDURE — 36415 COLL VENOUS BLD VENIPUNCTURE: CPT

## 2025-06-16 PROCEDURE — 3074F SYST BP LT 130 MM HG: CPT | Performed by: NURSE PRACTITIONER

## 2025-06-16 PROCEDURE — 83880 ASSAY OF NATRIURETIC PEPTIDE: CPT

## 2025-06-16 PROCEDURE — G8427 DOCREV CUR MEDS BY ELIG CLIN: HCPCS | Performed by: NURSE PRACTITIONER

## 2025-06-16 PROCEDURE — 1036F TOBACCO NON-USER: CPT | Performed by: NURSE PRACTITIONER

## 2025-06-16 PROCEDURE — 99214 OFFICE O/P EST MOD 30 MIN: CPT | Performed by: NURSE PRACTITIONER

## 2025-06-16 PROCEDURE — G8399 PT W/DXA RESULTS DOCUMENT: HCPCS | Performed by: NURSE PRACTITIONER

## 2025-06-16 PROCEDURE — 80048 BASIC METABOLIC PNL TOTAL CA: CPT

## 2025-06-16 PROCEDURE — 3017F COLORECTAL CA SCREEN DOC REV: CPT | Performed by: NURSE PRACTITIONER

## 2025-06-16 PROCEDURE — 3078F DIAST BP <80 MM HG: CPT | Performed by: NURSE PRACTITIONER

## 2025-06-16 NOTE — PROGRESS NOTES
I, Josselyn Cook am scribing for and in the presence of Vaibhav Camarena MD, F.A.C.C..    Patient: Renata Hackett  : 1960  Date of Visit: 2025    REASON FOR VISIT / CONSULTATION: Follow-up (HX:SOB, HTN,HLD, vertigo. Pt is here for 1 year follow up. She states she is doing well. Occasonally has palps. Sob at times dizziness on and off Denies cp.)      History of Present Illness:        Dear OLIVER Espinoza - CNP,     I had the pleasure of seeing Renata Hackett, who is a 64 y.o. female with a history of worsening shortness of breath.    She does have hypertension diagnosed . She was diagnosed with hyperlipidemia and was treated years ago. She also has TASHIA, has a CPAP but has not been able to use it because she does not have a mask. She did have gestational diabetes with 1 out of 6 pregnancies. She has thyroid issues. She is former smoker, for 40+ years, smoked a pack daily,  and quit 5 years ago.   She is former methamphetamine user for about 20 years ago, and quit in 2017.    She reports she had a stroke in 2018.    Echo done on 3/18/2024- EF 55-60%. Left ventricle size is normal. Normal wall thickness. Grade I diastolic dysfunction with normal LAP.  Tricuspid Valve: Mild regurgitation.     EKG done in office 2024- Normal sinus rhythm.    CAM monitor done on 2024- Poor data quality, only 1 day and 4 hours analyzed. Baseline rhythm is sinus with average heart rate of 83 bpm, ranging between 68 and 136 bpm. Sinus tachycardia represented 8% of the study duration. Rare isolated PACs noted. Overall fairly unremarkable study.    CAM monitor done on 2024- Predominant rhythm: ST Atrial Tachycardia (AT) 2 episodes, Longest 8 beats @ Avg 151 bpm up to 177 bpm, Fastest 4 beats @ Avg 179 bpm up to 197 bpm PAC 0.1 %. Multiple symptoms were reported and were associated with sinus tachycardia at around 110 bpm as well as isolated PVC's. Clinical correlation required.         Stress test

## 2025-06-16 NOTE — TELEPHONE ENCOUNTER
----- Message from OLIVER Young CNP sent at 6/16/2025  1:01 PM EDT -----  Please let patient know blood work was fairly unremarkable. Please continue current treatment and follow up. Please call with questions and concerns. Thank you

## 2025-06-16 NOTE — RESULT ENCOUNTER NOTE
Please let patient know blood work was fairly unremarkable. Please continue current treatment and follow up. Please call with questions and concerns. Thank you

## 2025-06-24 ENCOUNTER — HOSPITAL ENCOUNTER (OUTPATIENT)
Age: 65
Discharge: HOME | End: 2025-06-26
Payer: MEDICARE

## 2025-06-24 DIAGNOSIS — R06.02 SOB (SHORTNESS OF BREATH): ICD-10-CM

## 2025-06-24 DIAGNOSIS — I10 ESSENTIAL HYPERTENSION: ICD-10-CM

## 2025-06-24 DIAGNOSIS — R00.2 PALPITATIONS: ICD-10-CM

## 2025-06-24 DIAGNOSIS — R00.0 TACHYCARDIA: ICD-10-CM

## 2025-06-24 DIAGNOSIS — E78.2 MIXED HYPERLIPIDEMIA: ICD-10-CM

## 2025-06-24 PROCEDURE — 93243 EXT ECG>48HR<7D SCAN A/R: CPT

## 2025-07-14 RX ORDER — METOPROLOL SUCCINATE 50 MG/1
50 TABLET, EXTENDED RELEASE ORAL DAILY
Qty: 90 TABLET | Refills: 3 | Status: SHIPPED | OUTPATIENT
Start: 2025-07-14

## 2025-07-16 ENCOUNTER — OFFICE VISIT (OUTPATIENT)
Dept: VASCULAR SURGERY | Age: 65
End: 2025-07-16
Payer: MEDICARE

## 2025-07-16 VITALS
HEIGHT: 62 IN | SYSTOLIC BLOOD PRESSURE: 109 MMHG | TEMPERATURE: 96.2 F | BODY MASS INDEX: 42.69 KG/M2 | RESPIRATION RATE: 20 BRPM | HEART RATE: 66 BPM | WEIGHT: 232 LBS | DIASTOLIC BLOOD PRESSURE: 60 MMHG

## 2025-07-16 DIAGNOSIS — M79.605 LEFT LEG PAIN: Primary | ICD-10-CM

## 2025-07-16 PROCEDURE — 1090F PRES/ABSN URINE INCON ASSESS: CPT | Performed by: INTERNAL MEDICINE

## 2025-07-16 PROCEDURE — G8427 DOCREV CUR MEDS BY ELIG CLIN: HCPCS | Performed by: INTERNAL MEDICINE

## 2025-07-16 PROCEDURE — 3078F DIAST BP <80 MM HG: CPT | Performed by: INTERNAL MEDICINE

## 2025-07-16 PROCEDURE — 1123F ACP DISCUSS/DSCN MKR DOCD: CPT | Performed by: INTERNAL MEDICINE

## 2025-07-16 PROCEDURE — 1036F TOBACCO NON-USER: CPT | Performed by: INTERNAL MEDICINE

## 2025-07-16 PROCEDURE — 1159F MED LIST DOCD IN RCRD: CPT | Performed by: INTERNAL MEDICINE

## 2025-07-16 PROCEDURE — 1126F AMNT PAIN NOTED NONE PRSNT: CPT | Performed by: INTERNAL MEDICINE

## 2025-07-16 PROCEDURE — 99214 OFFICE O/P EST MOD 30 MIN: CPT | Performed by: INTERNAL MEDICINE

## 2025-07-16 PROCEDURE — G8399 PT W/DXA RESULTS DOCUMENT: HCPCS | Performed by: INTERNAL MEDICINE

## 2025-07-16 PROCEDURE — G8417 CALC BMI ABV UP PARAM F/U: HCPCS | Performed by: INTERNAL MEDICINE

## 2025-07-16 PROCEDURE — 3017F COLORECTAL CA SCREEN DOC REV: CPT | Performed by: INTERNAL MEDICINE

## 2025-07-16 PROCEDURE — 3074F SYST BP LT 130 MM HG: CPT | Performed by: INTERNAL MEDICINE

## 2025-07-16 NOTE — PATIENT INSTRUCTIONS
SURVEY:    Thank you for allowing us to care for you today.    You may be receiving a survey from [facility] regarding your visit today- electronically or via mail.      Please help us by completing the survey as this will provide the needed feedback to ensure we are providing the very best care for you and your family.    If you cannot score us a very good on any question, please call the office to discuss how we could have made your experience a very good one.    Thank you.       STAFF:    Brittani Lemus, Paola LÓPEZ      CLINICAL STAFF:    Misty GARCIA, Nicole LÓPEZ, Franca LÓPEZ, Lima GARCIA

## 2025-07-16 NOTE — PROGRESS NOTES
Renata Hackett was seen on 7/16/2025 for   Chief Complaint   Patient presents with    Leg Swelling     Follow up. Patient denies anymore leg pain or swelling since last visit.                               REVIEW OF SYSTEMS    Constitutional Weight: absent, A, Fatigue: absent Fever: absent   HEENT Ears: normal,Visual disturbance: absent Sore throat: absent,    Respiratory Shortness of breath: absent, Cough: absent;, Snoring: absent   Cardivascular Chest pain: absent,  Leg pain: absent,Leg swelling:absent, Non-healing wound:absent    GI Diarrhea: absent, Abdominal Pain: absent    Urinary frequency: absent, Urinary incontinence: absent   Musculoskeletal Neck pain: absent, Back pain: absent, Restless Leg:absent     Dermatological Hair loss: absent, Skin changes: absent   Neurological  Sudden Loss of Vision in one eye:absent, Slurred Speech:absent, Weakness on one side of body: absent,Headache: absent   Psychiatric Anxiety: absent, Depression: absent   Hematologic Abnormal bleeding: absent,

## 2025-07-16 NOTE — PROGRESS NOTES
Renata Hackett was seen on 2025 for   Chief Complaint   Patient presents with    Leg Swelling     Follow up. Patient denies anymore leg pain or swelling since last visit.   .  24 1st [facility] visit. Left leg and foot swells. Standing for brief periods, or driving. Much worse over last 3-4 months. No hx of blood clot. Mother had blood clot. No hx of varicose veins. Unless she has swelling, she can walk ok. 10/14/24 had LYNETTE with R NC, TBI 0.85 L 1.15 TBI 0.79  Has been doing a lot of long drives to [place]. Swelling is painful. Gets some RLS sx.   Current smoker. Takes atorva. Lives in [place]. Sometimes left leg goes numb with standing  Most recent thyroid, hemoglobin, lytes, LFT's unremarkable.  No hx of heart disease. Echo and stress were ok with mild tr.  Doesn't like to wear socks.  UPDATE 24 Swelling about the same. Using compression has been very difficult. 24 DUS was negative. When she stands for example at sink, then her back hurts and her leg goes numb. Only back injury was \"I broke my tailbone\". No dx of arthritis.   UPDATE 25 Doing \"really good\". Swelling is less. Has some numbness that she attributes to nerve injury. On last visit we discussed more detailed neuro eval, but she has not seen anyone. She has been watching her salt intake and weight has improved.       Social History     Socioeconomic History    Marital status:      Spouse name: Not on file    Number of children: Not on file    Years of education: Not on file    Highest education level: Not on file   Occupational History    Not on file   Tobacco Use    Smoking status: Former     Current packs/day: 0.00     Average packs/day: 0.7 packs/day for 44.9 years (30.5 ttl pk-yrs)     Types: Cigarettes     Start date: 10/8/1979     Quit date: 9/10/2024     Years since quittin.8    Smokeless tobacco: Never    Tobacco comments:     6 cigarettes a day (25)   Vaping Use    Vaping status: Never Used

## 2025-08-07 ENCOUNTER — OFFICE VISIT (OUTPATIENT)
Dept: PULMONOLOGY | Age: 65
End: 2025-08-07
Payer: MEDICARE

## 2025-08-07 VITALS
WEIGHT: 235.1 LBS | HEIGHT: 62 IN | TEMPERATURE: 97.5 F | BODY MASS INDEX: 43.26 KG/M2 | DIASTOLIC BLOOD PRESSURE: 75 MMHG | RESPIRATION RATE: 18 BRPM | OXYGEN SATURATION: 95 % | SYSTOLIC BLOOD PRESSURE: 126 MMHG | HEART RATE: 100 BPM

## 2025-08-07 DIAGNOSIS — R05.3 CHRONIC COUGH: ICD-10-CM

## 2025-08-07 DIAGNOSIS — E66.813 OBESITY, CLASS III, BMI 40-49.9 (MORBID OBESITY) (HCC): ICD-10-CM

## 2025-08-07 DIAGNOSIS — J44.89 COPD WITH ASTHMA (HCC): ICD-10-CM

## 2025-08-07 DIAGNOSIS — G47.33 OSA (OBSTRUCTIVE SLEEP APNEA): ICD-10-CM

## 2025-08-07 DIAGNOSIS — J45.40 MODERATE PERSISTENT ASTHMA WITHOUT COMPLICATION: Primary | ICD-10-CM

## 2025-08-07 PROCEDURE — 3017F COLORECTAL CA SCREEN DOC REV: CPT | Performed by: INTERNAL MEDICINE

## 2025-08-07 PROCEDURE — 99214 OFFICE O/P EST MOD 30 MIN: CPT | Performed by: INTERNAL MEDICINE

## 2025-08-07 PROCEDURE — 3023F SPIROM DOC REV: CPT | Performed by: INTERNAL MEDICINE

## 2025-08-07 PROCEDURE — 3078F DIAST BP <80 MM HG: CPT | Performed by: INTERNAL MEDICINE

## 2025-08-07 PROCEDURE — G8417 CALC BMI ABV UP PARAM F/U: HCPCS | Performed by: INTERNAL MEDICINE

## 2025-08-07 PROCEDURE — G8399 PT W/DXA RESULTS DOCUMENT: HCPCS | Performed by: INTERNAL MEDICINE

## 2025-08-07 PROCEDURE — 3074F SYST BP LT 130 MM HG: CPT | Performed by: INTERNAL MEDICINE

## 2025-08-07 PROCEDURE — 1123F ACP DISCUSS/DSCN MKR DOCD: CPT | Performed by: INTERNAL MEDICINE

## 2025-08-07 PROCEDURE — 1036F TOBACCO NON-USER: CPT | Performed by: INTERNAL MEDICINE

## 2025-08-07 PROCEDURE — 1090F PRES/ABSN URINE INCON ASSESS: CPT | Performed by: INTERNAL MEDICINE

## 2025-08-07 PROCEDURE — 1159F MED LIST DOCD IN RCRD: CPT | Performed by: INTERNAL MEDICINE

## 2025-08-07 PROCEDURE — 1126F AMNT PAIN NOTED NONE PRSNT: CPT | Performed by: INTERNAL MEDICINE

## 2025-08-07 PROCEDURE — G8427 DOCREV CUR MEDS BY ELIG CLIN: HCPCS | Performed by: INTERNAL MEDICINE

## (undated) DEVICE — FORCEPS BX L240CM JAW DIA2.4MM ORNG L CAP W/ NDL DISP RAD

## (undated) DEVICE — CANNULA ORAL NSL AD CO2 N INTUB O2 DEL DISP TRU LNK

## (undated) DEVICE — SOLUTION IV IRRIG POUR BRL 0.9% SODIUM CHL 2F7124

## (undated) DEVICE — MEDI-VAC NON-CONDUCTIVE TUBING7MM X 30.5 (100FT): Brand: CARDINAL HEALTH

## (undated) DEVICE — NEEDLE 25GAX5.0MM INJ CARR LOCKE